# Patient Record
Sex: FEMALE | Race: BLACK OR AFRICAN AMERICAN | NOT HISPANIC OR LATINO | Employment: OTHER | URBAN - METROPOLITAN AREA
[De-identification: names, ages, dates, MRNs, and addresses within clinical notes are randomized per-mention and may not be internally consistent; named-entity substitution may affect disease eponyms.]

---

## 2020-01-01 ENCOUNTER — APPOINTMENT (OUTPATIENT)
Dept: GENERAL RADIOLOGY | Facility: HOSPITAL | Age: 75
End: 2020-01-01

## 2020-01-01 ENCOUNTER — HOSPITAL ENCOUNTER (OUTPATIENT)
Facility: HOSPITAL | Age: 75
Setting detail: HOSPITAL OUTPATIENT SURGERY
End: 2020-01-01
Attending: INTERNAL MEDICINE | Admitting: INTERNAL MEDICINE

## 2020-01-01 ENCOUNTER — APPOINTMENT (OUTPATIENT)
Dept: CT IMAGING | Facility: HOSPITAL | Age: 75
End: 2020-01-01

## 2020-01-01 ENCOUNTER — APPOINTMENT (OUTPATIENT)
Dept: CARDIOLOGY | Facility: HOSPITAL | Age: 75
End: 2020-01-01

## 2020-01-01 ENCOUNTER — HOSPITAL ENCOUNTER (INPATIENT)
Facility: HOSPITAL | Age: 75
LOS: 4 days | Discharge: NURSING, REHAB OR LONG-TERM CARE | End: 2020-05-05
Attending: EMERGENCY MEDICINE | Admitting: HOSPITALIST

## 2020-01-01 ENCOUNTER — HOSPITAL ENCOUNTER (INPATIENT)
Facility: HOSPITAL | Age: 75
LOS: 2 days | End: 2020-06-07
Attending: EMERGENCY MEDICINE | Admitting: INTERNAL MEDICINE

## 2020-01-01 ENCOUNTER — LAB REQUISITION (OUTPATIENT)
Dept: LAB | Facility: HOSPITAL | Age: 75
End: 2020-01-01

## 2020-01-01 VITALS
WEIGHT: 195.33 LBS | TEMPERATURE: 94.8 F | SYSTOLIC BLOOD PRESSURE: 97 MMHG | BODY MASS INDEX: 31.39 KG/M2 | HEIGHT: 66 IN | OXYGEN SATURATION: 75 % | DIASTOLIC BLOOD PRESSURE: 79 MMHG

## 2020-01-01 VITALS
HEART RATE: 69 BPM | RESPIRATION RATE: 16 BRPM | SYSTOLIC BLOOD PRESSURE: 138 MMHG | BODY MASS INDEX: 31.7 KG/M2 | OXYGEN SATURATION: 100 % | TEMPERATURE: 97.4 F | HEIGHT: 65 IN | WEIGHT: 190.26 LBS | DIASTOLIC BLOOD PRESSURE: 86 MMHG

## 2020-01-01 DIAGNOSIS — R79.89 ELEVATED PROCALCITONIN: ICD-10-CM

## 2020-01-01 DIAGNOSIS — A41.9 SEPSIS WITH ACUTE RENAL FAILURE AND SEPTIC SHOCK, DUE TO UNSPECIFIED ORGANISM, UNSPECIFIED ACUTE RENAL FAILURE TYPE (HCC): Primary | ICD-10-CM

## 2020-01-01 DIAGNOSIS — R65.21 SEPSIS WITH ACUTE RENAL FAILURE AND SEPTIC SHOCK, DUE TO UNSPECIFIED ORGANISM, UNSPECIFIED ACUTE RENAL FAILURE TYPE (HCC): Primary | ICD-10-CM

## 2020-01-01 DIAGNOSIS — R93.0 ABNORMAL HEAD CT: ICD-10-CM

## 2020-01-01 DIAGNOSIS — D69.6 THROMBOCYTOPENIA (HCC): ICD-10-CM

## 2020-01-01 DIAGNOSIS — Z00.00 ROUTINE GENERAL MEDICAL EXAMINATION AT A HEALTH CARE FACILITY: ICD-10-CM

## 2020-01-01 DIAGNOSIS — Z99.2 ESRD (END STAGE RENAL DISEASE) ON DIALYSIS (HCC): ICD-10-CM

## 2020-01-01 DIAGNOSIS — R78.81 BACTEREMIA: Primary | ICD-10-CM

## 2020-01-01 DIAGNOSIS — N39.0 ACUTE UTI: ICD-10-CM

## 2020-01-01 DIAGNOSIS — N17.9 SEPSIS WITH ACUTE RENAL FAILURE AND SEPTIC SHOCK, DUE TO UNSPECIFIED ORGANISM, UNSPECIFIED ACUTE RENAL FAILURE TYPE (HCC): Primary | ICD-10-CM

## 2020-01-01 DIAGNOSIS — N18.6 ESRD (END STAGE RENAL DISEASE) ON DIALYSIS (HCC): ICD-10-CM

## 2020-01-01 LAB
ABO GROUP BLD: NORMAL
ABO GROUP BLD: NORMAL
ALBUMIN SERPL-MCNC: 1.2 G/DL (ref 3.5–5.2)
ALBUMIN SERPL-MCNC: 1.2 G/DL (ref 3.5–5.2)
ALBUMIN SERPL-MCNC: 1.5 G/DL (ref 3.5–5.2)
ALBUMIN SERPL-MCNC: 1.5 G/DL (ref 3.5–5.2)
ALBUMIN SERPL-MCNC: 1.6 G/DL (ref 3.5–5.2)
ALBUMIN SERPL-MCNC: 1.9 G/DL (ref 3.5–5.2)
ALBUMIN SERPL-MCNC: 2 G/DL (ref 3.5–5.2)
ALBUMIN/GLOB SERPL: 0.4 G/DL
ALBUMIN/GLOB SERPL: 0.5 G/DL
ALBUMIN/GLOB SERPL: 0.5 G/DL
ALP SERPL-CCNC: 121 U/L (ref 39–117)
ALP SERPL-CCNC: 213 U/L (ref 39–117)
ALP SERPL-CCNC: 98 U/L (ref 39–117)
ALT SERPL W P-5'-P-CCNC: 18 U/L (ref 1–33)
ALT SERPL W P-5'-P-CCNC: 24 U/L (ref 1–33)
ALT SERPL W P-5'-P-CCNC: 41 U/L (ref 1–33)
ANION GAP SERPL CALCULATED.3IONS-SCNC: 15.8 MMOL/L (ref 5–15)
ANION GAP SERPL CALCULATED.3IONS-SCNC: 17.1 MMOL/L (ref 5–15)
ANION GAP SERPL CALCULATED.3IONS-SCNC: 18.3 MMOL/L (ref 5–15)
ANION GAP SERPL CALCULATED.3IONS-SCNC: 18.4 MMOL/L (ref 5–15)
ANION GAP SERPL CALCULATED.3IONS-SCNC: 18.5 MMOL/L (ref 5–15)
ANION GAP SERPL CALCULATED.3IONS-SCNC: 28.5 MMOL/L (ref 5–15)
ANION GAP SERPL CALCULATED.3IONS-SCNC: 28.9 MMOL/L (ref 5–15)
ANION GAP SERPL CALCULATED.3IONS-SCNC: 33.9 MMOL/L (ref 5–15)
ANISOCYTOSIS BLD QL: ABNORMAL
AORTIC DIMENSIONLESS INDEX: 0.7 (DI)
APTT PPP: >200 SECONDS (ref 22.7–35.4)
ARTERIAL PATENCY WRIST A: POSITIVE
AST SERPL-CCNC: 28 U/L (ref 1–32)
AST SERPL-CCNC: 49 U/L (ref 1–32)
AST SERPL-CCNC: 62 U/L (ref 1–32)
ATMOSPHERIC PRESS: 750.2 MMHG
ATMOSPHERIC PRESS: 751.5 MMHG
ATMOSPHERIC PRESS: 752.4 MMHG
BACTERIA SPEC AEROBE CULT: ABNORMAL
BACTERIA SPEC AEROBE CULT: NORMAL
BACTERIA UR QL AUTO: ABNORMAL /HPF
BACTERIA UR QL AUTO: ABNORMAL /HPF
BASE EXCESS BLDA CALC-SCNC: -21.8 MMOL/L (ref 0–2)
BASE EXCESS BLDA CALC-SCNC: -22.6 MMOL/L (ref 0–2)
BASE EXCESS BLDA CALC-SCNC: -6.2 MMOL/L (ref 0–2)
BASOPHILS # BLD MANUAL: 0.04 10*3/MM3 (ref 0–0.2)
BASOPHILS NFR BLD AUTO: 1 % (ref 0–1.5)
BDY SITE: ABNORMAL
BH BB BLOOD EXPIRATION DATE: NORMAL
BH BB BLOOD EXPIRATION DATE: NORMAL
BH BB BLOOD TYPE BARCODE: 5100
BH BB BLOOD TYPE BARCODE: 5100
BH BB DISPENSE STATUS: NORMAL
BH BB DISPENSE STATUS: NORMAL
BH BB PRODUCT CODE: NORMAL
BH BB PRODUCT CODE: NORMAL
BH BB UNIT NUMBER: NORMAL
BH BB UNIT NUMBER: NORMAL
BH CV ECHO MEAS - ACS: 0.9 CM
BH CV ECHO MEAS - AO MAX PG: 13 MMHG
BH CV ECHO MEAS - AO MEAN PG (FULL): 3 MMHG
BH CV ECHO MEAS - AO MEAN PG: 5 MMHG
BH CV ECHO MEAS - AO V2 MAX: 179 CM/SEC
BH CV ECHO MEAS - AO V2 MEAN: 101 CM/SEC
BH CV ECHO MEAS - AO V2 VTI: 29.5 CM
BH CV ECHO MEAS - AVA(I,A): 2.1 CM^2
BH CV ECHO MEAS - AVA(I,D): 2.1 CM^2
BH CV ECHO MEAS - BSA(HAYCOCK): 1.9 M^2
BH CV ECHO MEAS - BSA: 1.9 M^2
BH CV ECHO MEAS - BZI_BMI: 28.2 KILOGRAMS/M^2
BH CV ECHO MEAS - BZI_METRIC_HEIGHT: 167.6 CM
BH CV ECHO MEAS - BZI_METRIC_WEIGHT: 79.4 KG
BH CV ECHO MEAS - EDV(CUBED): 42.9 ML
BH CV ECHO MEAS - EDV(MOD-SP2): 39 ML
BH CV ECHO MEAS - EDV(MOD-SP4): 45 ML
BH CV ECHO MEAS - EDV(TEICH): 50.9 ML
BH CV ECHO MEAS - EF(CUBED): 75.2 %
BH CV ECHO MEAS - EF(MOD-BP): 70 %
BH CV ECHO MEAS - EF(MOD-SP2): 61.5 %
BH CV ECHO MEAS - EF(MOD-SP4): 71.1 %
BH CV ECHO MEAS - EF(TEICH): 68.1 %
BH CV ECHO MEAS - ESV(CUBED): 10.6 ML
BH CV ECHO MEAS - ESV(MOD-SP2): 15 ML
BH CV ECHO MEAS - ESV(MOD-SP4): 13 ML
BH CV ECHO MEAS - ESV(TEICH): 16.2 ML
BH CV ECHO MEAS - FS: 37.1 %
BH CV ECHO MEAS - IVS/LVPW: 0.91
BH CV ECHO MEAS - IVSD: 1 CM
BH CV ECHO MEAS - LAT PEAK E' VEL: 7 CM/SEC
BH CV ECHO MEAS - LV DIASTOLIC VOL/BSA (35-75): 23.8 ML/M^2
BH CV ECHO MEAS - LV MASS(C)D: 111 GRAMS
BH CV ECHO MEAS - LV MASS(C)DI: 58.8 GRAMS/M^2
BH CV ECHO MEAS - LV MAX PG: 6 MMHG
BH CV ECHO MEAS - LV MEAN PG: 2 MMHG
BH CV ECHO MEAS - LV SYSTOLIC VOL/BSA (12-30): 6.9 ML/M^2
BH CV ECHO MEAS - LV V1 MAX: 122 CM/SEC
BH CV ECHO MEAS - LV V1 MEAN: 67.8 CM/SEC
BH CV ECHO MEAS - LV V1 VTI: 19.7 CM
BH CV ECHO MEAS - LVIDD: 3.5 CM
BH CV ECHO MEAS - LVIDS: 2.2 CM
BH CV ECHO MEAS - LVLD AP2: 5.8 CM
BH CV ECHO MEAS - LVLD AP4: 7.1 CM
BH CV ECHO MEAS - LVLS AP2: 5 CM
BH CV ECHO MEAS - LVLS AP4: 4.9 CM
BH CV ECHO MEAS - LVOT AREA (M): 3.1 CM^2
BH CV ECHO MEAS - LVOT AREA: 3.1 CM^2
BH CV ECHO MEAS - LVOT DIAM: 2 CM
BH CV ECHO MEAS - LVPWD: 1.1 CM
BH CV ECHO MEAS - MED PEAK E' VEL: 5 CM/SEC
BH CV ECHO MEAS - MV A DUR: 0.12 SEC
BH CV ECHO MEAS - MV A MAX VEL: 104 CM/SEC
BH CV ECHO MEAS - MV DEC SLOPE: 274 CM/SEC^2
BH CV ECHO MEAS - MV DEC TIME: 250 SEC
BH CV ECHO MEAS - MV E MAX VEL: 70.6 CM/SEC
BH CV ECHO MEAS - MV E/A: 0.68
BH CV ECHO MEAS - MV MEAN PG: 2 MMHG
BH CV ECHO MEAS - MV P1/2T MAX VEL: 69.8 CM/SEC
BH CV ECHO MEAS - MV P1/2T: 74.6 MSEC
BH CV ECHO MEAS - MV V2 MEAN: 57.8 CM/SEC
BH CV ECHO MEAS - MV V2 VTI: 21.7 CM
BH CV ECHO MEAS - MVA P1/2T LCG: 3.2 CM^2
BH CV ECHO MEAS - MVA(P1/2T): 2.9 CM^2
BH CV ECHO MEAS - MVA(VTI): 2.9 CM^2
BH CV ECHO MEAS - PA ACC SLOPE: 857 CM/SEC^2
BH CV ECHO MEAS - PA ACC TIME: 0.11 SEC
BH CV ECHO MEAS - PA MAX PG: 3.6 MMHG
BH CV ECHO MEAS - PA PR(ACCEL): 29.1 MMHG
BH CV ECHO MEAS - PA V2 MAX: 95 CM/SEC
BH CV ECHO MEAS - PULM A REVS DUR: 0.11 SEC
BH CV ECHO MEAS - PULM A REVS VEL: 27.7 CM/SEC
BH CV ECHO MEAS - PULM DIAS VEL: 23.4 CM/SEC
BH CV ECHO MEAS - PULM S/D: 1.2
BH CV ECHO MEAS - PULM SYS VEL: 27.3 CM/SEC
BH CV ECHO MEAS - QP/QS: 0.45
BH CV ECHO MEAS - RV MEAN PG: 1 MMHG
BH CV ECHO MEAS - RV V1 MEAN: 35.7 CM/SEC
BH CV ECHO MEAS - RV V1 VTI: 8.9 CM
BH CV ECHO MEAS - RVOT AREA: 3.1 CM^2
BH CV ECHO MEAS - RVOT DIAM: 2 CM
BH CV ECHO MEAS - SI(CUBED): 17.1 ML/M^2
BH CV ECHO MEAS - SI(LVOT): 32.7 ML/M^2
BH CV ECHO MEAS - SI(MOD-SP2): 12.7 ML/M^2
BH CV ECHO MEAS - SI(MOD-SP4): 16.9 ML/M^2
BH CV ECHO MEAS - SI(TEICH): 18.3 ML/M^2
BH CV ECHO MEAS - SV(CUBED): 32.2 ML
BH CV ECHO MEAS - SV(LVOT): 61.9 ML
BH CV ECHO MEAS - SV(MOD-SP2): 24 ML
BH CV ECHO MEAS - SV(MOD-SP4): 32 ML
BH CV ECHO MEAS - SV(RVOT): 27.9 ML
BH CV ECHO MEAS - SV(TEICH): 34.7 ML
BH CV ECHO MEAS - TAPSE (>1.6): 1 CM2
BH CV ECHO MEAS - TR MAX VEL: 261 CM/SEC
BH CV ECHO MEASUREMENTS AVERAGE E/E' RATIO: 11.77
BH CV VAS BP RIGHT ARM: NORMAL MMHG
BH CV XLRA - RV BASE: 3 CM
BH CV XLRA - TDI S': 11 CM/SEC
BH CV XLRA MEAS LEFT CCA RATIO VEL: 47.7 CM/SEC
BH CV XLRA MEAS LEFT DIST CCA EDV: -15.1 CM/SEC
BH CV XLRA MEAS LEFT DIST CCA PSV: -27.7 CM/SEC
BH CV XLRA MEAS LEFT DIST ICA EDV: -96.2 CM/SEC
BH CV XLRA MEAS LEFT DIST ICA PSV: -164.6 CM/SEC
BH CV XLRA MEAS LEFT ICA RATIO VEL: -165 CM/SEC
BH CV XLRA MEAS LEFT ICA/CCA RATIO: -3.5
BH CV XLRA MEAS LEFT MID ICA EDV: -22.1 CM/SEC
BH CV XLRA MEAS LEFT MID ICA PSV: -40.3 CM/SEC
BH CV XLRA MEAS LEFT PROX CCA EDV: 21.1 CM/SEC
BH CV XLRA MEAS LEFT PROX CCA PSV: 47.7 CM/SEC
BH CV XLRA MEAS LEFT PROX ECA EDV: -7.9 CM/SEC
BH CV XLRA MEAS LEFT PROX ECA PSV: -13.1 CM/SEC
BH CV XLRA MEAS LEFT PROX ICA EDV: 16 CM/SEC
BH CV XLRA MEAS LEFT PROX ICA PSV: 25 CM/SEC
BH CV XLRA MEAS LEFT PROX SCLA PSV: 57.2 CM/SEC
BH CV XLRA MEAS LEFT VERTEBRAL A EDV: -31.1 CM/SEC
BH CV XLRA MEAS LEFT VERTEBRAL A PSV: -52.2 CM/SEC
BH CV XLRA MEAS RIGHT CCA RATIO VEL: 22 CM/SEC
BH CV XLRA MEAS RIGHT DIST CCA EDV: 9.8 CM/SEC
BH CV XLRA MEAS RIGHT DIST CCA PSV: 22 CM/SEC
BH CV XLRA MEAS RIGHT DIST ICA EDV: -40.3 CM/SEC
BH CV XLRA MEAS RIGHT DIST ICA PSV: -67.6 CM/SEC
BH CV XLRA MEAS RIGHT ICA RATIO VEL: -67.6 CM/SEC
BH CV XLRA MEAS RIGHT ICA/CCA RATIO: -3.1
BH CV XLRA MEAS RIGHT MID ICA EDV: -25 CM/SEC
BH CV XLRA MEAS RIGHT MID ICA PSV: -39.4 CM/SEC
BH CV XLRA MEAS RIGHT PROX CCA EDV: -10.6 CM/SEC
BH CV XLRA MEAS RIGHT PROX CCA PSV: -34.9 CM/SEC
BH CV XLRA MEAS RIGHT PROX ECA EDV: -6.5 CM/SEC
BH CV XLRA MEAS RIGHT PROX ECA PSV: -12.4 CM/SEC
BH CV XLRA MEAS RIGHT PROX ICA EDV: -6.3 CM/SEC
BH CV XLRA MEAS RIGHT PROX ICA PSV: -12.4 CM/SEC
BH CV XLRA MEAS RIGHT PROX SCLA PSV: 45.7 CM/SEC
BH CV XLRA MEAS RIGHT VERTEBRAL A EDV: 40.4 CM/SEC
BH CV XLRA MEAS RIGHT VERTEBRAL A PSV: 69.8 CM/SEC
BILIRUB SERPL-MCNC: 0.6 MG/DL (ref 0.2–1.2)
BILIRUB SERPL-MCNC: 0.8 MG/DL (ref 0.2–1.2)
BILIRUB SERPL-MCNC: 0.8 MG/DL (ref 0.2–1.2)
BILIRUB UR QL STRIP: NEGATIVE
BILIRUB UR QL STRIP: NEGATIVE
BLD GP AB SCN SERPL QL: NEGATIVE
BLD GP AB SCN SERPL QL: NEGATIVE
BUN BLD-MCNC: 10 MG/DL (ref 8–23)
BUN BLD-MCNC: 25 MG/DL (ref 8–23)
BUN BLD-MCNC: 31 MG/DL (ref 8–23)
BUN BLD-MCNC: 37 MG/DL (ref 8–23)
BUN BLD-MCNC: 6 MG/DL (ref 8–23)
BUN BLD-MCNC: 7 MG/DL (ref 8–23)
BUN/CREAT SERPL: 3.1 (ref 7–25)
BUN/CREAT SERPL: 3.4 (ref 7–25)
BUN/CREAT SERPL: 3.6 (ref 7–25)
BUN/CREAT SERPL: 3.6 (ref 7–25)
BUN/CREAT SERPL: 3.7 (ref 7–25)
BUN/CREAT SERPL: 7.4 (ref 7–25)
BUN/CREAT SERPL: 7.8 (ref 7–25)
BUN/CREAT SERPL: 8 (ref 7–25)
BURR CELLS BLD QL SMEAR: ABNORMAL
BURR CELLS BLD QL SMEAR: ABNORMAL
CA-I BLD-MCNC: 3.5 MG/DL (ref 4.6–5.4)
CA-I SERPL ISE-MCNC: 0.88 MMOL/L (ref 1.15–1.35)
CALCIUM SPEC-SCNC: 6.3 MG/DL (ref 8.6–10.5)
CALCIUM SPEC-SCNC: 6.9 MG/DL (ref 8.6–10.5)
CALCIUM SPEC-SCNC: 7 MG/DL (ref 8.6–10.5)
CALCIUM SPEC-SCNC: 7.1 MG/DL (ref 8.6–10.5)
CALCIUM SPEC-SCNC: 7.2 MG/DL (ref 8.6–10.5)
CALCIUM SPEC-SCNC: 8 MG/DL (ref 8.6–10.5)
CALCIUM SPEC-SCNC: 8 MG/DL (ref 8.6–10.5)
CALCIUM SPEC-SCNC: 8.3 MG/DL (ref 8.6–10.5)
CHLORIDE SERPL-SCNC: 100 MMOL/L (ref 98–107)
CHLORIDE SERPL-SCNC: 101 MMOL/L (ref 98–107)
CHLORIDE SERPL-SCNC: 103 MMOL/L (ref 98–107)
CHLORIDE SERPL-SCNC: 105 MMOL/L (ref 98–107)
CHLORIDE SERPL-SCNC: 105 MMOL/L (ref 98–107)
CHLORIDE SERPL-SCNC: 108 MMOL/L (ref 98–107)
CHLORIDE SERPL-SCNC: 96 MMOL/L (ref 98–107)
CHLORIDE SERPL-SCNC: 97 MMOL/L (ref 98–107)
CHOLEST SERPL-MCNC: 82 MG/DL (ref 0–200)
CLARITY UR: ABNORMAL
CLARITY UR: ABNORMAL
CO2 SERPL-SCNC: 17.7 MMOL/L (ref 22–29)
CO2 SERPL-SCNC: 21.2 MMOL/L (ref 22–29)
CO2 SERPL-SCNC: 25.5 MMOL/L (ref 22–29)
CO2 SERPL-SCNC: 25.6 MMOL/L (ref 22–29)
CO2 SERPL-SCNC: 27.9 MMOL/L (ref 22–29)
CO2 SERPL-SCNC: 4.1 MMOL/L (ref 22–29)
CO2 SERPL-SCNC: 6.5 MMOL/L (ref 22–29)
CO2 SERPL-SCNC: 8.1 MMOL/L (ref 22–29)
COLOR UR: ABNORMAL
COLOR UR: ABNORMAL
CREAT BLD-MCNC: 1.92 MG/DL (ref 0.57–1)
CREAT BLD-MCNC: 1.97 MG/DL (ref 0.57–1)
CREAT BLD-MCNC: 2.69 MG/DL (ref 0.57–1)
CREAT BLD-MCNC: 2.79 MG/DL (ref 0.57–1)
CREAT BLD-MCNC: 2.92 MG/DL (ref 0.57–1)
CREAT BLD-MCNC: 3.37 MG/DL (ref 0.57–1)
CREAT BLD-MCNC: 3.96 MG/DL (ref 0.57–1)
CREAT BLD-MCNC: 4.61 MG/DL (ref 0.57–1)
CRP SERPL-MCNC: 23.64 MG/DL (ref 0–0.5)
D-LACTATE SERPL-SCNC: 17.6 MMOL/L (ref 0.5–2)
D-LACTATE SERPL-SCNC: 6 MMOL/L (ref 0.5–2)
D-LACTATE SERPL-SCNC: 6.7 MMOL/L (ref 0.5–2)
D-LACTATE SERPL-SCNC: 6.7 MMOL/L (ref 0.5–2)
DACRYOCYTES BLD QL SMEAR: ABNORMAL
DACRYOCYTES BLD QL SMEAR: ABNORMAL
DEPRECATED RDW RBC AUTO: 54.3 FL (ref 37–54)
DEPRECATED RDW RBC AUTO: 56 FL (ref 37–54)
DEPRECATED RDW RBC AUTO: 56.4 FL (ref 37–54)
DEPRECATED RDW RBC AUTO: 57.3 FL (ref 37–54)
DEPRECATED RDW RBC AUTO: 58.3 FL (ref 37–54)
DEPRECATED RDW RBC AUTO: 59.8 FL (ref 37–54)
DEPRECATED RDW RBC AUTO: 60.3 FL (ref 37–54)
DEPRECATED RDW RBC AUTO: 60.5 FL (ref 37–54)
DEPRECATED RDW RBC AUTO: 61.3 FL (ref 37–54)
DEPRECATED RDW RBC AUTO: 62.2 FL (ref 37–54)
EOSINOPHIL # BLD MANUAL: 0.07 10*3/MM3 (ref 0–0.4)
EOSINOPHIL # BLD MANUAL: 0.14 10*3/MM3 (ref 0–0.4)
EOSINOPHIL NFR BLD MANUAL: 1 % (ref 0.3–6.2)
EOSINOPHIL NFR BLD MANUAL: 2 % (ref 0.3–6.2)
ERYTHROCYTE [DISTWIDTH] IN BLOOD BY AUTOMATED COUNT: 15.2 % (ref 12.3–15.4)
ERYTHROCYTE [DISTWIDTH] IN BLOOD BY AUTOMATED COUNT: 17.8 % (ref 12.3–15.4)
ERYTHROCYTE [DISTWIDTH] IN BLOOD BY AUTOMATED COUNT: 18 % (ref 12.3–15.4)
ERYTHROCYTE [DISTWIDTH] IN BLOOD BY AUTOMATED COUNT: 18.1 % (ref 12.3–15.4)
ERYTHROCYTE [DISTWIDTH] IN BLOOD BY AUTOMATED COUNT: 18.4 % (ref 12.3–15.4)
ERYTHROCYTE [DISTWIDTH] IN BLOOD BY AUTOMATED COUNT: 19.8 % (ref 12.3–15.4)
ERYTHROCYTE [DISTWIDTH] IN BLOOD BY AUTOMATED COUNT: 20.7 % (ref 12.3–15.4)
ERYTHROCYTE [DISTWIDTH] IN BLOOD BY AUTOMATED COUNT: 20.7 % (ref 12.3–15.4)
ERYTHROCYTE [DISTWIDTH] IN BLOOD BY AUTOMATED COUNT: 20.8 % (ref 12.3–15.4)
ERYTHROCYTE [DISTWIDTH] IN BLOOD BY AUTOMATED COUNT: 21 % (ref 12.3–15.4)
ERYTHROCYTE [SEDIMENTATION RATE] IN BLOOD: 53 MM/HR (ref 0–30)
FERRITIN SERPL-MCNC: 1108 NG/ML (ref 13–150)
FOLATE SERPL-MCNC: 15 NG/ML (ref 4.78–24.2)
GFR SERPL CREATININE-BSD FRML MDRD: 11 ML/MIN/1.73
GFR SERPL CREATININE-BSD FRML MDRD: 13 ML/MIN/1.73
GFR SERPL CREATININE-BSD FRML MDRD: 16 ML/MIN/1.73
GFR SERPL CREATININE-BSD FRML MDRD: 19 ML/MIN/1.73
GFR SERPL CREATININE-BSD FRML MDRD: 20 ML/MIN/1.73
GFR SERPL CREATININE-BSD FRML MDRD: 21 ML/MIN/1.73
GFR SERPL CREATININE-BSD FRML MDRD: 30 ML/MIN/1.73
GFR SERPL CREATININE-BSD FRML MDRD: 31 ML/MIN/1.73
GFR SERPL CREATININE-BSD FRML MDRD: ABNORMAL ML/MIN/{1.73_M2}
GFR SERPL CREATININE-BSD FRML MDRD: ABNORMAL ML/MIN/{1.73_M2}
GLOBULIN UR ELPH-MCNC: 3.8 GM/DL
GLOBULIN UR ELPH-MCNC: 4.1 GM/DL
GLOBULIN UR ELPH-MCNC: 4.2 GM/DL
GLUCOSE BLD-MCNC: 122 MG/DL (ref 65–99)
GLUCOSE BLD-MCNC: 133 MG/DL (ref 65–99)
GLUCOSE BLD-MCNC: 136 MG/DL (ref 65–99)
GLUCOSE BLD-MCNC: 142 MG/DL (ref 65–99)
GLUCOSE BLD-MCNC: 155 MG/DL (ref 65–99)
GLUCOSE BLD-MCNC: 220 MG/DL (ref 65–99)
GLUCOSE BLD-MCNC: 326 MG/DL (ref 65–99)
GLUCOSE BLD-MCNC: 95 MG/DL (ref 65–99)
GLUCOSE BLDC GLUCOMTR-MCNC: 100 MG/DL (ref 70–130)
GLUCOSE BLDC GLUCOMTR-MCNC: 100 MG/DL (ref 70–130)
GLUCOSE BLDC GLUCOMTR-MCNC: 102 MG/DL (ref 70–130)
GLUCOSE BLDC GLUCOMTR-MCNC: 103 MG/DL (ref 70–130)
GLUCOSE BLDC GLUCOMTR-MCNC: 104 MG/DL (ref 70–130)
GLUCOSE BLDC GLUCOMTR-MCNC: 105 MG/DL (ref 70–130)
GLUCOSE BLDC GLUCOMTR-MCNC: 110 MG/DL (ref 70–130)
GLUCOSE BLDC GLUCOMTR-MCNC: 117 MG/DL (ref 70–130)
GLUCOSE BLDC GLUCOMTR-MCNC: 117 MG/DL (ref 70–130)
GLUCOSE BLDC GLUCOMTR-MCNC: 120 MG/DL (ref 70–130)
GLUCOSE BLDC GLUCOMTR-MCNC: 121 MG/DL (ref 70–130)
GLUCOSE BLDC GLUCOMTR-MCNC: 137 MG/DL (ref 70–130)
GLUCOSE BLDC GLUCOMTR-MCNC: 154 MG/DL (ref 70–130)
GLUCOSE BLDC GLUCOMTR-MCNC: 182 MG/DL (ref 70–130)
GLUCOSE BLDC GLUCOMTR-MCNC: 33 MG/DL (ref 70–130)
GLUCOSE BLDC GLUCOMTR-MCNC: 72 MG/DL (ref 70–130)
GLUCOSE BLDC GLUCOMTR-MCNC: 73 MG/DL (ref 70–130)
GLUCOSE BLDC GLUCOMTR-MCNC: 79 MG/DL (ref 70–130)
GLUCOSE BLDC GLUCOMTR-MCNC: 85 MG/DL (ref 70–130)
GLUCOSE BLDC GLUCOMTR-MCNC: 87 MG/DL (ref 70–130)
GLUCOSE BLDC GLUCOMTR-MCNC: 87 MG/DL (ref 70–130)
GLUCOSE BLDC GLUCOMTR-MCNC: 93 MG/DL (ref 70–130)
GLUCOSE BLDC GLUCOMTR-MCNC: 94 MG/DL (ref 70–130)
GLUCOSE BLDC GLUCOMTR-MCNC: 98 MG/DL (ref 70–130)
GLUCOSE UR STRIP-MCNC: NEGATIVE MG/DL
GLUCOSE UR STRIP-MCNC: NEGATIVE MG/DL
HAPTOGLOB SERPL-MCNC: 276 MG/DL (ref 30–200)
HBA1C MFR BLD: 4.9 % (ref 4.8–5.6)
HBA1C MFR BLD: 5.62 % (ref 4.8–5.6)
HBV SURFACE AG SERPL QL IA: NORMAL
HCO3 BLDA-SCNC: 17.9 MMOL/L (ref 22–28)
HCO3 BLDA-SCNC: 7 MMOL/L (ref 22–28)
HCO3 BLDA-SCNC: 8 MMOL/L (ref 22–28)
HCT VFR BLD AUTO: 16.4 % (ref 34–46.6)
HCT VFR BLD AUTO: 20 % (ref 34–46.6)
HCT VFR BLD AUTO: 25 % (ref 34–46.6)
HCT VFR BLD AUTO: 25.1 % (ref 34–46.6)
HCT VFR BLD AUTO: 25.1 % (ref 34–46.6)
HCT VFR BLD AUTO: 25.3 % (ref 34–46.6)
HCT VFR BLD AUTO: 26 % (ref 34–46.6)
HCT VFR BLD AUTO: 26.4 % (ref 34–46.6)
HCT VFR BLD AUTO: 26.7 % (ref 34–46.6)
HCT VFR BLD AUTO: 26.8 % (ref 34–46.6)
HDLC SERPL-MCNC: 8 MG/DL (ref 40–60)
HGB BLD-MCNC: 5.1 G/DL (ref 12–15.9)
HGB BLD-MCNC: 6.5 G/DL (ref 12–15.9)
HGB BLD-MCNC: 8.1 G/DL (ref 12–15.9)
HGB BLD-MCNC: 8.4 G/DL (ref 12–15.9)
HGB BLD-MCNC: 8.4 G/DL (ref 12–15.9)
HGB BLD-MCNC: 8.5 G/DL (ref 12–15.9)
HGB BLD-MCNC: 8.5 G/DL (ref 12–15.9)
HGB BLD-MCNC: 8.7 G/DL (ref 12–15.9)
HGB BLD-MCNC: 9.2 G/DL (ref 12–15.9)
HGB BLD-MCNC: 9.3 G/DL (ref 12–15.9)
HGB UR QL STRIP.AUTO: ABNORMAL
HGB UR QL STRIP.AUTO: NEGATIVE
HOLD SPECIMEN: NORMAL
HOROWITZ INDEX BLD+IHG-RTO: 100 %
HOROWITZ INDEX BLD+IHG-RTO: 21 %
HOROWITZ INDEX BLD+IHG-RTO: 21 %
HYALINE CASTS UR QL AUTO: ABNORMAL /LPF
HYALINE CASTS UR QL AUTO: ABNORMAL /LPF
HYPOCHROMIA BLD QL: ABNORMAL
IGA SERPL-MCNC: 710 MG/DL (ref 64–422)
IGG SERPL-MCNC: 745 MG/DL (ref 586–1602)
IGM SERPL-MCNC: 74 MG/DL (ref 26–217)
INR PPP: 1.38 (ref 0.9–1.1)
INR PPP: 2.27 (ref 0.9–1.1)
INR PPP: 3.49 (ref 0.9–1.1)
INR PPP: 6.62 (ref 0.9–1.1)
INR PPP: 6.67 (ref 0.9–1.1)
IRON 24H UR-MRATE: 35 MCG/DL (ref 37–145)
IRON SATN MFR SERPL: 28 % (ref 20–50)
KETONES UR QL STRIP: ABNORMAL
KETONES UR QL STRIP: ABNORMAL
LACTATE HOLD SPECIMEN: NORMAL
LDH SERPL-CCNC: 219 U/L (ref 135–214)
LDH SERPL-CCNC: 227 U/L (ref 135–214)
LDLC SERPL CALC-MCNC: 42 MG/DL (ref 0–100)
LDLC/HDLC SERPL: 5.23 {RATIO}
LEFT ATRIUM VOLUME INDEX: 14 ML/M2
LEUKOCYTE ESTERASE UR QL STRIP.AUTO: ABNORMAL
LEUKOCYTE ESTERASE UR QL STRIP.AUTO: ABNORMAL
LIPASE SERPL-CCNC: 19 U/L (ref 13–60)
LYMPHOCYTES # BLD MANUAL: 0.28 10*3/MM3 (ref 0.7–3.1)
LYMPHOCYTES # BLD MANUAL: 0.3 10*3/MM3 (ref 0.7–3.1)
LYMPHOCYTES # BLD MANUAL: 0.96 10*3/MM3 (ref 0.7–3.1)
LYMPHOCYTES # BLD MANUAL: 0.99 10*3/MM3 (ref 0.7–3.1)
LYMPHOCYTES # BLD MANUAL: 1.44 10*3/MM3 (ref 0.7–3.1)
LYMPHOCYTES # BLD MANUAL: 1.47 10*3/MM3 (ref 0.7–3.1)
LYMPHOCYTES # BLD MANUAL: 1.96 10*3/MM3 (ref 0.7–3.1)
LYMPHOCYTES # BLD MANUAL: 2.45 10*3/MM3 (ref 0.7–3.1)
LYMPHOCYTES NFR BLD MANUAL: 11 % (ref 5–12)
LYMPHOCYTES NFR BLD MANUAL: 15 % (ref 19.6–45.3)
LYMPHOCYTES NFR BLD MANUAL: 17 % (ref 19.6–45.3)
LYMPHOCYTES NFR BLD MANUAL: 2 % (ref 19.6–45.3)
LYMPHOCYTES NFR BLD MANUAL: 2 % (ref 19.6–45.3)
LYMPHOCYTES NFR BLD MANUAL: 20.2 % (ref 5–12)
LYMPHOCYTES NFR BLD MANUAL: 21 % (ref 19.6–45.3)
LYMPHOCYTES NFR BLD MANUAL: 3 % (ref 5–12)
LYMPHOCYTES NFR BLD MANUAL: 3 % (ref 5–12)
LYMPHOCYTES NFR BLD MANUAL: 33.3 % (ref 19.6–45.3)
LYMPHOCYTES NFR BLD MANUAL: 4 % (ref 5–12)
LYMPHOCYTES NFR BLD MANUAL: 40 % (ref 19.6–45.3)
LYMPHOCYTES NFR BLD MANUAL: 6 % (ref 5–12)
LYMPHOCYTES NFR BLD MANUAL: 7 % (ref 19.6–45.3)
LYMPHOCYTES NFR BLD MANUAL: 7 % (ref 5–12)
LYMPHOCYTES NFR BLD MANUAL: 7 % (ref 5–12)
MACROCYTES BLD QL SMEAR: ABNORMAL
MACROCYTES BLD QL SMEAR: ABNORMAL
MAGNESIUM SERPL-MCNC: 1.8 MG/DL (ref 1.6–2.4)
MAGNESIUM SERPL-MCNC: 1.9 MG/DL (ref 1.6–2.4)
MAXIMAL PREDICTED HEART RATE: 145 BPM
MCH RBC QN AUTO: 27.1 PG (ref 26.6–33)
MCH RBC QN AUTO: 27.2 PG (ref 26.6–33)
MCH RBC QN AUTO: 27.6 PG (ref 26.6–33)
MCH RBC QN AUTO: 28.2 PG (ref 26.6–33)
MCH RBC QN AUTO: 28.2 PG (ref 26.6–33)
MCH RBC QN AUTO: 29 PG (ref 26.6–33)
MCH RBC QN AUTO: 29.2 PG (ref 26.6–33)
MCH RBC QN AUTO: 29.3 PG (ref 26.6–33)
MCH RBC QN AUTO: 29.5 PG (ref 26.6–33)
MCH RBC QN AUTO: 30.7 PG (ref 26.6–33)
MCHC RBC AUTO-ENTMCNC: 31.1 G/DL (ref 31.5–35.7)
MCHC RBC AUTO-ENTMCNC: 32.4 G/DL (ref 31.5–35.7)
MCHC RBC AUTO-ENTMCNC: 32.5 G/DL (ref 31.5–35.7)
MCHC RBC AUTO-ENTMCNC: 32.7 G/DL (ref 31.5–35.7)
MCHC RBC AUTO-ENTMCNC: 33 G/DL (ref 31.5–35.7)
MCHC RBC AUTO-ENTMCNC: 33.2 G/DL (ref 31.5–35.7)
MCHC RBC AUTO-ENTMCNC: 33.5 G/DL (ref 31.5–35.7)
MCHC RBC AUTO-ENTMCNC: 33.9 G/DL (ref 31.5–35.7)
MCHC RBC AUTO-ENTMCNC: 34.5 G/DL (ref 31.5–35.7)
MCHC RBC AUTO-ENTMCNC: 34.7 G/DL (ref 31.5–35.7)
MCV RBC AUTO: 80.4 FL (ref 79–97)
MCV RBC AUTO: 81.2 FL (ref 79–97)
MCV RBC AUTO: 81.9 FL (ref 79–97)
MCV RBC AUTO: 82.8 FL (ref 79–97)
MCV RBC AUTO: 83.8 FL (ref 79–97)
MCV RBC AUTO: 87.5 FL (ref 79–97)
MCV RBC AUTO: 87.8 FL (ref 79–97)
MCV RBC AUTO: 89.3 FL (ref 79–97)
MCV RBC AUTO: 90.9 FL (ref 79–97)
MCV RBC AUTO: 98.8 FL (ref 79–97)
METAMYELOCYTES NFR BLD MANUAL: 1 % (ref 0–0)
METAMYELOCYTES NFR BLD MANUAL: 2 % (ref 0–0)
METAMYELOCYTES NFR BLD MANUAL: 3 % (ref 0–0)
MODALITY: ABNORMAL
MONOCYTES # BLD AUTO: 0.26 10*3/MM3 (ref 0.1–0.9)
MONOCYTES # BLD AUTO: 0.35 10*3/MM3 (ref 0.1–0.9)
MONOCYTES # BLD AUTO: 0.41 10*3/MM3 (ref 0.1–0.9)
MONOCYTES # BLD AUTO: 0.41 10*3/MM3 (ref 0.1–0.9)
MONOCYTES # BLD AUTO: 0.58 10*3/MM3 (ref 0.1–0.9)
MONOCYTES # BLD AUTO: 0.6 10*3/MM3 (ref 0.1–0.9)
MONOCYTES # BLD AUTO: 0.99 10*3/MM3 (ref 0.1–0.9)
MONOCYTES # BLD AUTO: 1.79 10*3/MM3 (ref 0.1–0.9)
MYELOCYTES NFR BLD MANUAL: 1 % (ref 0–0)
MYELOCYTES NFR BLD MANUAL: 2 % (ref 0–0)
MYELOCYTES NFR BLD MANUAL: 2 % (ref 0–0)
MYELOCYTES NFR BLD MANUAL: 5 % (ref 0–0)
NEUTROPHILS # BLD AUTO: 1.06 10*3/MM3 (ref 1.7–7)
NEUTROPHILS # BLD AUTO: 1.33 10*3/MM3 (ref 1.7–7)
NEUTROPHILS # BLD AUTO: 11.18 10*3/MM3 (ref 1.7–7)
NEUTROPHILS # BLD AUTO: 11.57 10*3/MM3 (ref 1.7–7)
NEUTROPHILS # BLD AUTO: 12.95 10*3/MM3 (ref 1.7–7)
NEUTROPHILS # BLD AUTO: 14.14 10*3/MM3 (ref 1.7–7)
NEUTROPHILS # BLD AUTO: 4.54 10*3/MM3 (ref 1.7–7)
NEUTROPHILS # BLD AUTO: 8.86 10*3/MM3 (ref 1.7–7)
NEUTROPHILS NFR BLD MANUAL: 29 % (ref 42.7–76)
NEUTROPHILS NFR BLD MANUAL: 46.4 % (ref 42.7–76)
NEUTROPHILS NFR BLD MANUAL: 66 % (ref 42.7–76)
NEUTROPHILS NFR BLD MANUAL: 71 % (ref 42.7–76)
NEUTROPHILS NFR BLD MANUAL: 77 % (ref 42.7–76)
NEUTROPHILS NFR BLD MANUAL: 79 % (ref 42.7–76)
NEUTROPHILS NFR BLD MANUAL: 94 % (ref 42.7–76)
NEUTROPHILS NFR BLD MANUAL: 94 % (ref 42.7–76)
NITRITE UR QL STRIP: NEGATIVE
NITRITE UR QL STRIP: POSITIVE
NRBC SPEC MANUAL: 1 /100 WBC (ref 0–0.2)
NRBC SPEC MANUAL: 161.9 /100 WBC (ref 0–0.2)
NRBC SPEC MANUAL: 2 /100 WBC (ref 0–0.2)
NRBC SPEC MANUAL: 37 /100 WBC (ref 0–0.2)
NRBC SPEC MANUAL: 4 /100 WBC (ref 0–0.2)
NRBC SPEC MANUAL: 7 /100 WBC (ref 0–0.2)
O2 A-A PPRESDIFF RESPIRATORY: 0.4 MMHG
O2 A-A PPRESDIFF RESPIRATORY: 0.5 MMHG
O2 A-A PPRESDIFF RESPIRATORY: 0.7 MMHG
PCO2 BLDA: 27.8 MM HG (ref 35–45)
PCO2 BLDA: 29.9 MM HG (ref 35–45)
PCO2 BLDA: 32.5 MM HG (ref 35–45)
PEEP RESPIRATORY: 5 CM[H2O]
PH BLDA: 7 PH UNITS (ref 7.35–7.45)
PH BLDA: 7.01 PH UNITS (ref 7.35–7.45)
PH BLDA: 7.38 PH UNITS (ref 7.35–7.45)
PH UR STRIP.AUTO: 6 [PH] (ref 5–8)
PH UR STRIP.AUTO: 7 [PH] (ref 5–8)
PHOSPHATE SERPL-MCNC: 2.5 MG/DL (ref 2.5–4.5)
PHOSPHATE SERPL-MCNC: 3.8 MG/DL (ref 2.5–4.5)
PHOSPHATE SERPL-MCNC: 4.1 MG/DL (ref 2.5–4.5)
PHOSPHATE SERPL-MCNC: 6.8 MG/DL (ref 2.5–4.5)
PLAT MORPH BLD: NORMAL
PLATELET # BLD AUTO: 12 10*3/MM3 (ref 140–450)
PLATELET # BLD AUTO: 26 10*3/MM3 (ref 140–450)
PLATELET # BLD AUTO: 30 10*3/MM3 (ref 140–450)
PLATELET # BLD AUTO: 35 10*3/MM3 (ref 140–450)
PLATELET # BLD AUTO: 36 10*3/MM3 (ref 140–450)
PLATELET # BLD AUTO: 39 10*3/MM3 (ref 140–450)
PLATELET # BLD AUTO: 39 10*3/MM3 (ref 140–450)
PLATELET # BLD AUTO: 42 10*3/MM3 (ref 140–450)
PLATELET # BLD AUTO: 42 10*3/MM3 (ref 140–450)
PLATELET # BLD AUTO: 45 10*3/MM3 (ref 140–450)
PLATELET # BLD AUTO: 54 10*3/MM3 (ref 140–450)
PLATELET # BLD AUTO: 58 10*3/MM3 (ref 140–450)
PLATELETS.RETICULATED NFR BLD AUTO: 18.4 % (ref 0.9–6.5)
PLATELETS.RETICULATED NFR BLD AUTO: 20.5 % (ref 0.9–6.5)
PMV BLD AUTO: 9.7 FL (ref 6–12)
PMV BLD AUTO: ABNORMAL FL
PO2 BLDA: 277.5 MM HG (ref 80–100)
PO2 BLDA: 64 MM HG (ref 80–100)
PO2 BLDA: 81.3 MM HG (ref 80–100)
POIKILOCYTOSIS BLD QL SMEAR: ABNORMAL
POLYCHROMASIA BLD QL SMEAR: ABNORMAL
POTASSIUM BLD-SCNC: 3 MMOL/L (ref 3.5–5.2)
POTASSIUM BLD-SCNC: 3.1 MMOL/L (ref 3.5–5.2)
POTASSIUM BLD-SCNC: 3.3 MMOL/L (ref 3.5–5.2)
POTASSIUM BLD-SCNC: 3.9 MMOL/L (ref 3.5–5.2)
POTASSIUM BLD-SCNC: 4.1 MMOL/L (ref 3.5–5.2)
POTASSIUM BLD-SCNC: 4.3 MMOL/L (ref 3.5–5.2)
POTASSIUM BLD-SCNC: 4.4 MMOL/L (ref 3.5–5.2)
POTASSIUM BLD-SCNC: 6 MMOL/L (ref 3.5–5.2)
PROCALCITONIN SERPL-MCNC: 10.33 NG/ML (ref 0.1–0.25)
PROMYELOCYTES NFR BLD MANUAL: 1 % (ref 0–0)
PROT PATTERN SERPL IFE-IMP: ABNORMAL
PROT SERPL-MCNC: 5.6 G/DL (ref 6–8.5)
PROT SERPL-MCNC: 5.7 G/DL (ref 6–8.5)
PROT SERPL-MCNC: 6.2 G/DL (ref 6–8.5)
PROT UR QL STRIP: ABNORMAL
PROT UR QL STRIP: ABNORMAL
PROTHROMBIN TIME: 16.7 SECONDS (ref 11.7–14.2)
PROTHROMBIN TIME: 24.8 SECONDS (ref 11.7–14.2)
PROTHROMBIN TIME: 34.8 SECONDS (ref 11.7–14.2)
PROTHROMBIN TIME: 57.8 SECONDS (ref 11.7–14.2)
PROTHROMBIN TIME: 58.1 SECONDS (ref 11.7–14.2)
RBC # BLD AUTO: 1.66 10*6/MM3 (ref 3.77–5.28)
RBC # BLD AUTO: 2.24 10*6/MM3 (ref 3.77–5.28)
RBC # BLD AUTO: 2.75 10*6/MM3 (ref 3.77–5.28)
RBC # BLD AUTO: 2.87 10*6/MM3 (ref 3.77–5.28)
RBC # BLD AUTO: 2.88 10*6/MM3 (ref 3.77–5.28)
RBC # BLD AUTO: 3.12 10*6/MM3 (ref 3.77–5.28)
RBC # BLD AUTO: 3.14 10*6/MM3 (ref 3.77–5.28)
RBC # BLD AUTO: 3.15 10*6/MM3 (ref 3.77–5.28)
RBC # BLD AUTO: 3.26 10*6/MM3 (ref 3.77–5.28)
RBC # BLD AUTO: 3.3 10*6/MM3 (ref 3.77–5.28)
RBC # UR: ABNORMAL /HPF
RBC # UR: ABNORMAL /HPF
RBC MORPH BLD: NORMAL
REF LAB TEST METHOD: ABNORMAL
REF LAB TEST METHOD: ABNORMAL
RETICS # AUTO: 0.03 10*6/MM3 (ref 0.02–0.13)
RETICS/RBC NFR AUTO: 0.85 % (ref 0.7–1.9)
RH BLD: POSITIVE
RH BLD: POSITIVE
SAO2 % BLDCOA: 78.9 % (ref 92–99)
SAO2 % BLDCOA: 96 % (ref 92–99)
SAO2 % BLDCOA: 99.7 % (ref 92–99)
SARS-COV-2 RNA PNL SPEC NAA+PROBE: NOT DETECTED
SCHISTOCYTES BLD QL SMEAR: ABNORMAL
SET MECH RESP RATE: 22
SMUDGE CELLS BLD QL SMEAR: ABNORMAL
SODIUM BLD-SCNC: 138 MMOL/L (ref 136–145)
SODIUM BLD-SCNC: 139 MMOL/L (ref 136–145)
SODIUM BLD-SCNC: 140 MMOL/L (ref 136–145)
SODIUM BLD-SCNC: 142 MMOL/L (ref 136–145)
SODIUM BLD-SCNC: 142 MMOL/L (ref 136–145)
SODIUM BLD-SCNC: 143 MMOL/L (ref 136–145)
SODIUM BLD-SCNC: 143 MMOL/L (ref 136–145)
SODIUM BLD-SCNC: 144 MMOL/L (ref 136–145)
SP GR UR STRIP: 1.02 (ref 1–1.03)
SP GR UR STRIP: 1.02 (ref 1–1.03)
SQUAMOUS #/AREA URNS HPF: ABNORMAL /HPF
SQUAMOUS #/AREA URNS HPF: ABNORMAL /HPF
STRESS TARGET HR: 123 BPM
T&S EXPIRATION DATE: NORMAL
T&S EXPIRATION DATE: NORMAL
TARGETS BLD QL SMEAR: ABNORMAL
TIBC SERPL-MCNC: 127 MCG/DL (ref 298–536)
TOTAL RATE: 18 BREATHS/MINUTE
TOTAL RATE: 18 BREATHS/MINUTE
TOTAL RATE: 26 BREATHS/MINUTE
TRANSFERRIN SERPL-MCNC: 85 MG/DL (ref 200–360)
TRIGL SERPL-MCNC: 161 MG/DL (ref 0–150)
UNIT  ABO: NORMAL
UNIT  ABO: NORMAL
UNIT  RH: NORMAL
UNIT  RH: NORMAL
UROBILINOGEN UR QL STRIP: ABNORMAL
UROBILINOGEN UR QL STRIP: ABNORMAL
VANCOMYCIN SERPL-MCNC: 22.7 MCG/ML (ref 5–40)
VANCOMYCIN SERPL-MCNC: 9.3 MCG/ML (ref 5–40)
VARIANT LYMPHS NFR BLD MANUAL: 18 % (ref 0–5)
VARIANT LYMPHS NFR BLD MANUAL: 4 % (ref 0–5)
VENTILATOR MODE: AC
VIT B12 BLD-MCNC: >2000 PG/ML (ref 211–946)
VLDLC SERPL-MCNC: 32.2 MG/DL (ref 5–40)
VT ON VENT VENT: 600 ML
WBC MORPH BLD: NORMAL
WBC NRBC COR # BLD: 11.51 10*3/MM3 (ref 3.4–10.8)
WBC NRBC COR # BLD: 13.78 10*3/MM3 (ref 3.4–10.8)
WBC NRBC COR # BLD: 14.15 10*3/MM3 (ref 3.4–10.8)
WBC NRBC COR # BLD: 15.04 10*3/MM3 (ref 3.4–10.8)
WBC NRBC COR # BLD: 16.3 10*3/MM3 (ref 3.4–10.8)
WBC NRBC COR # BLD: 2.75 10*3/MM3 (ref 3.4–10.8)
WBC NRBC COR # BLD: 2.87 10*3/MM3 (ref 3.4–10.8)
WBC NRBC COR # BLD: 3.67 10*3/MM3 (ref 3.4–10.8)
WBC NRBC COR # BLD: 6.88 10*3/MM3 (ref 3.4–10.8)
WBC NRBC COR # BLD: 8.12 10*3/MM3 (ref 3.4–10.8)
WBC UR QL AUTO: ABNORMAL /HPF
WBC UR QL AUTO: ABNORMAL /HPF
WHOLE BLOOD HOLD SPECIMEN: NORMAL
WHOLE BLOOD HOLD SPECIMEN: NORMAL

## 2020-01-01 PROCEDURE — 83605 ASSAY OF LACTIC ACID: CPT | Performed by: INTERNAL MEDICINE

## 2020-01-01 PROCEDURE — 94799 UNLISTED PULMONARY SVC/PX: CPT

## 2020-01-01 PROCEDURE — 82962 GLUCOSE BLOOD TEST: CPT

## 2020-01-01 PROCEDURE — 87040 BLOOD CULTURE FOR BACTERIA: CPT | Performed by: INTERNAL MEDICINE

## 2020-01-01 PROCEDURE — 0BH18EZ INSERTION OF ENDOTRACHEAL AIRWAY INTO TRACHEA, VIA NATURAL OR ARTIFICIAL OPENING ENDOSCOPIC: ICD-10-PCS | Performed by: INTERNAL MEDICINE

## 2020-01-01 PROCEDURE — 92610 EVALUATE SWALLOWING FUNCTION: CPT

## 2020-01-01 PROCEDURE — 99233 SBSQ HOSP IP/OBS HIGH 50: CPT | Performed by: INTERNAL MEDICINE

## 2020-01-01 PROCEDURE — 83615 LACTATE (LD) (LDH) ENZYME: CPT | Performed by: INTERNAL MEDICINE

## 2020-01-01 PROCEDURE — 80069 RENAL FUNCTION PANEL: CPT | Performed by: INTERNAL MEDICINE

## 2020-01-01 PROCEDURE — 80053 COMPREHEN METABOLIC PANEL: CPT | Performed by: NURSE PRACTITIONER

## 2020-01-01 PROCEDURE — 25010000002 ERTAPENEM PER 500 MG: Performed by: INTERNAL MEDICINE

## 2020-01-01 PROCEDURE — 99231 SBSQ HOSP IP/OBS SF/LOW 25: CPT | Performed by: INTERNAL MEDICINE

## 2020-01-01 PROCEDURE — 85007 BL SMEAR W/DIFF WBC COUNT: CPT | Performed by: INTERNAL MEDICINE

## 2020-01-01 PROCEDURE — 87086 URINE CULTURE/COLONY COUNT: CPT | Performed by: NURSE PRACTITIONER

## 2020-01-01 PROCEDURE — 85730 THROMBOPLASTIN TIME PARTIAL: CPT | Performed by: INTERNAL MEDICINE

## 2020-01-01 PROCEDURE — 85027 COMPLETE CBC AUTOMATED: CPT | Performed by: INTERNAL MEDICINE

## 2020-01-01 PROCEDURE — 99232 SBSQ HOSP IP/OBS MODERATE 35: CPT | Performed by: INTERNAL MEDICINE

## 2020-01-01 PROCEDURE — 81001 URINALYSIS AUTO W/SCOPE: CPT | Performed by: EMERGENCY MEDICINE

## 2020-01-01 PROCEDURE — 94003 VENT MGMT INPAT SUBQ DAY: CPT

## 2020-01-01 PROCEDURE — 93880 EXTRACRANIAL BILAT STUDY: CPT

## 2020-01-01 PROCEDURE — 63710000001 INSULIN LISPRO (HUMAN) PER 5 UNITS: Performed by: HOSPITALIST

## 2020-01-01 PROCEDURE — 74176 CT ABD & PELVIS W/O CONTRAST: CPT

## 2020-01-01 PROCEDURE — P9035 PLATELET PHERES LEUKOREDUCED: HCPCS

## 2020-01-01 PROCEDURE — 80053 COMPREHEN METABOLIC PANEL: CPT | Performed by: EMERGENCY MEDICINE

## 2020-01-01 PROCEDURE — 71045 X-RAY EXAM CHEST 1 VIEW: CPT

## 2020-01-01 PROCEDURE — 84466 ASSAY OF TRANSFERRIN: CPT | Performed by: INTERNAL MEDICINE

## 2020-01-01 PROCEDURE — 25010000002 FENTANYL CITRATE (PF) 100 MCG/2ML SOLUTION

## 2020-01-01 PROCEDURE — 80048 BASIC METABOLIC PNL TOTAL CA: CPT | Performed by: HOSPITALIST

## 2020-01-01 PROCEDURE — 97110 THERAPEUTIC EXERCISES: CPT

## 2020-01-01 PROCEDURE — 85007 BL SMEAR W/DIFF WBC COUNT: CPT | Performed by: EMERGENCY MEDICINE

## 2020-01-01 PROCEDURE — 83540 ASSAY OF IRON: CPT | Performed by: INTERNAL MEDICINE

## 2020-01-01 PROCEDURE — 87340 HEPATITIS B SURFACE AG IA: CPT | Performed by: INTERNAL MEDICINE

## 2020-01-01 PROCEDURE — 87040 BLOOD CULTURE FOR BACTERIA: CPT | Performed by: NURSE PRACTITIONER

## 2020-01-01 PROCEDURE — 86923 COMPATIBILITY TEST ELECTRIC: CPT

## 2020-01-01 PROCEDURE — 36600 WITHDRAWAL OF ARTERIAL BLOOD: CPT

## 2020-01-01 PROCEDURE — 82746 ASSAY OF FOLIC ACID SERUM: CPT | Performed by: INTERNAL MEDICINE

## 2020-01-01 PROCEDURE — 25010000002 PIPERACILLIN SOD-TAZOBACTAM PER 1 G: Performed by: HOSPITALIST

## 2020-01-01 PROCEDURE — 25010000002 VANCOMYCIN 10 G RECONSTITUTED SOLUTION: Performed by: INTERNAL MEDICINE

## 2020-01-01 PROCEDURE — 25010000002 ONDANSETRON PER 1 MG: Performed by: HOSPITALIST

## 2020-01-01 PROCEDURE — 82803 BLOOD GASES ANY COMBINATION: CPT

## 2020-01-01 PROCEDURE — 86900 BLOOD TYPING SEROLOGIC ABO: CPT

## 2020-01-01 PROCEDURE — 81015 MICROSCOPIC EXAM OF URINE: CPT

## 2020-01-01 PROCEDURE — 74018 RADEX ABDOMEN 1 VIEW: CPT

## 2020-01-01 PROCEDURE — P9017 PLASMA 1 DONOR FRZ W/IN 8 HR: HCPCS

## 2020-01-01 PROCEDURE — 25010000002 PHENYLEPHRINE 10 MG/ML SOLUTION 5 ML VIAL: Performed by: INTERNAL MEDICINE

## 2020-01-01 PROCEDURE — 85025 COMPLETE CBC W/AUTO DIFF WBC: CPT | Performed by: EMERGENCY MEDICINE

## 2020-01-01 PROCEDURE — 25010000002 EPINEPHRINE 1 MG/ML SOLUTION 30 ML VIAL: Performed by: INTERNAL MEDICINE

## 2020-01-01 PROCEDURE — 80202 ASSAY OF VANCOMYCIN: CPT | Performed by: INTERNAL MEDICINE

## 2020-01-01 PROCEDURE — 86900 BLOOD TYPING SEROLOGIC ABO: CPT | Performed by: EMERGENCY MEDICINE

## 2020-01-01 PROCEDURE — 85025 COMPLETE CBC W/AUTO DIFF WBC: CPT | Performed by: INTERNAL MEDICINE

## 2020-01-01 PROCEDURE — 85610 PROTHROMBIN TIME: CPT | Performed by: INTERNAL MEDICINE

## 2020-01-01 PROCEDURE — 25010000002 MEROPENEM PER 100 MG: Performed by: INTERNAL MEDICINE

## 2020-01-01 PROCEDURE — 36415 COLL VENOUS BLD VENIPUNCTURE: CPT

## 2020-01-01 PROCEDURE — P9612 CATHETERIZE FOR URINE SPEC: HCPCS

## 2020-01-01 PROCEDURE — 85045 AUTOMATED RETICULOCYTE COUNT: CPT | Performed by: INTERNAL MEDICINE

## 2020-01-01 PROCEDURE — 25010000002 HEPARIN (PORCINE) PER 1000 UNITS: Performed by: INTERNAL MEDICINE

## 2020-01-01 PROCEDURE — 93005 ELECTROCARDIOGRAM TRACING: CPT | Performed by: INTERNAL MEDICINE

## 2020-01-01 PROCEDURE — 36415 COLL VENOUS BLD VENIPUNCTURE: CPT | Performed by: EMERGENCY MEDICINE

## 2020-01-01 PROCEDURE — 86140 C-REACTIVE PROTEIN: CPT | Performed by: EMERGENCY MEDICINE

## 2020-01-01 PROCEDURE — 82784 ASSAY IGA/IGD/IGG/IGM EACH: CPT | Performed by: INTERNAL MEDICINE

## 2020-01-01 PROCEDURE — 85055 RETICULATED PLATELET ASSAY: CPT | Performed by: INTERNAL MEDICINE

## 2020-01-01 PROCEDURE — 86927 PLASMA FRESH FROZEN: CPT

## 2020-01-01 PROCEDURE — 93010 ELECTROCARDIOGRAM REPORT: CPT | Performed by: INTERNAL MEDICINE

## 2020-01-01 PROCEDURE — 83036 HEMOGLOBIN GLYCOSYLATED A1C: CPT | Performed by: HOSPITALIST

## 2020-01-01 PROCEDURE — 83010 ASSAY OF HAPTOGLOBIN QUANT: CPT | Performed by: INTERNAL MEDICINE

## 2020-01-01 PROCEDURE — P9016 RBC LEUKOCYTES REDUCED: HCPCS

## 2020-01-01 PROCEDURE — 82728 ASSAY OF FERRITIN: CPT | Performed by: INTERNAL MEDICINE

## 2020-01-01 PROCEDURE — P9047 ALBUMIN (HUMAN), 25%, 50ML: HCPCS | Performed by: INTERNAL MEDICINE

## 2020-01-01 PROCEDURE — 87040 BLOOD CULTURE FOR BACTERIA: CPT | Performed by: EMERGENCY MEDICINE

## 2020-01-01 PROCEDURE — 85027 COMPLETE CBC AUTOMATED: CPT | Performed by: HOSPITALIST

## 2020-01-01 PROCEDURE — 85610 PROTHROMBIN TIME: CPT

## 2020-01-01 PROCEDURE — 86850 RBC ANTIBODY SCREEN: CPT | Performed by: EMERGENCY MEDICINE

## 2020-01-01 PROCEDURE — 97162 PT EVAL MOD COMPLEX 30 MIN: CPT

## 2020-01-01 PROCEDURE — 99232 SBSQ HOSP IP/OBS MODERATE 35: CPT | Performed by: PSYCHIATRY & NEUROLOGY

## 2020-01-01 PROCEDURE — 99223 1ST HOSP IP/OBS HIGH 75: CPT | Performed by: INTERNAL MEDICINE

## 2020-01-01 PROCEDURE — 84145 PROCALCITONIN (PCT): CPT | Performed by: NURSE PRACTITIONER

## 2020-01-01 PROCEDURE — 25010000003 HEPARIN LOCK FLUSH PER 10 UNITS: Performed by: EMERGENCY MEDICINE

## 2020-01-01 PROCEDURE — 25010000002 MEROPENEM PER 100 MG: Performed by: NURSE PRACTITIONER

## 2020-01-01 PROCEDURE — 85007 BL SMEAR W/DIFF WBC COUNT: CPT | Performed by: NURSE PRACTITIONER

## 2020-01-01 PROCEDURE — 80061 LIPID PANEL: CPT | Performed by: PSYCHIATRY & NEUROLOGY

## 2020-01-01 PROCEDURE — 86901 BLOOD TYPING SEROLOGIC RH(D): CPT | Performed by: EMERGENCY MEDICINE

## 2020-01-01 PROCEDURE — 99284 EMERGENCY DEPT VISIT MOD MDM: CPT

## 2020-01-01 PROCEDURE — 87635 SARS-COV-2 COVID-19 AMP PRB: CPT | Performed by: NURSE PRACTITIONER

## 2020-01-01 PROCEDURE — 81001 URINALYSIS AUTO W/SCOPE: CPT | Performed by: NURSE PRACTITIONER

## 2020-01-01 PROCEDURE — 36430 TRANSFUSION BLD/BLD COMPNT: CPT

## 2020-01-01 PROCEDURE — 25010000002 ALBUMIN HUMAN 25% PER 50 ML: Performed by: INTERNAL MEDICINE

## 2020-01-01 PROCEDURE — 85025 COMPLETE CBC W/AUTO DIFF WBC: CPT | Performed by: NURSE PRACTITIONER

## 2020-01-01 PROCEDURE — 93306 TTE W/DOPPLER COMPLETE: CPT

## 2020-01-01 PROCEDURE — 85610 PROTHROMBIN TIME: CPT | Performed by: HOSPITALIST

## 2020-01-01 PROCEDURE — 82330 ASSAY OF CALCIUM: CPT | Performed by: INTERNAL MEDICINE

## 2020-01-01 PROCEDURE — 82607 VITAMIN B-12: CPT | Performed by: INTERNAL MEDICINE

## 2020-01-01 PROCEDURE — 25010000002 PIPERACILLIN SOD-TAZOBACTAM PER 1 G: Performed by: EMERGENCY MEDICINE

## 2020-01-01 PROCEDURE — 70450 CT HEAD/BRAIN W/O DYE: CPT

## 2020-01-01 PROCEDURE — 25010000002 PROPOFOL 10 MG/ML EMULSION: Performed by: INTERNAL MEDICINE

## 2020-01-01 PROCEDURE — 83605 ASSAY OF LACTIC ACID: CPT | Performed by: NURSE PRACTITIONER

## 2020-01-01 PROCEDURE — 99285 EMERGENCY DEPT VISIT HI MDM: CPT

## 2020-01-01 PROCEDURE — 80053 COMPREHEN METABOLIC PANEL: CPT | Performed by: INTERNAL MEDICINE

## 2020-01-01 PROCEDURE — 94640 AIRWAY INHALATION TREATMENT: CPT

## 2020-01-01 PROCEDURE — 83735 ASSAY OF MAGNESIUM: CPT | Performed by: INTERNAL MEDICINE

## 2020-01-01 PROCEDURE — 83036 HEMOGLOBIN GLYCOSYLATED A1C: CPT | Performed by: PSYCHIATRY & NEUROLOGY

## 2020-01-01 PROCEDURE — 85652 RBC SED RATE AUTOMATED: CPT | Performed by: EMERGENCY MEDICINE

## 2020-01-01 PROCEDURE — 83690 ASSAY OF LIPASE: CPT | Performed by: EMERGENCY MEDICINE

## 2020-01-01 PROCEDURE — 71046 X-RAY EXAM CHEST 2 VIEWS: CPT

## 2020-01-01 PROCEDURE — 93306 TTE W/DOPPLER COMPLETE: CPT | Performed by: INTERNAL MEDICINE

## 2020-01-01 PROCEDURE — 99223 1ST HOSP IP/OBS HIGH 75: CPT | Performed by: PSYCHIATRY & NEUROLOGY

## 2020-01-01 PROCEDURE — 86850 RBC ANTIBODY SCREEN: CPT | Performed by: INTERNAL MEDICINE

## 2020-01-01 PROCEDURE — 5A1935Z RESPIRATORY VENTILATION, LESS THAN 24 CONSECUTIVE HOURS: ICD-10-PCS | Performed by: INTERNAL MEDICINE

## 2020-01-01 PROCEDURE — 86900 BLOOD TYPING SEROLOGIC ABO: CPT | Performed by: INTERNAL MEDICINE

## 2020-01-01 PROCEDURE — 86901 BLOOD TYPING SEROLOGIC RH(D): CPT | Performed by: INTERNAL MEDICINE

## 2020-01-01 PROCEDURE — 86334 IMMUNOFIX E-PHORESIS SERUM: CPT | Performed by: INTERNAL MEDICINE

## 2020-01-01 PROCEDURE — 25010000002 VANCOMYCIN 10 G RECONSTITUTED SOLUTION: Performed by: NURSE PRACTITIONER

## 2020-01-01 RX ORDER — SODIUM CHLORIDE 0.9 % (FLUSH) 0.9 %
20 SYRINGE (ML) INJECTION AS NEEDED
Status: DISCONTINUED | OUTPATIENT
Start: 2020-01-01 | End: 2020-01-01 | Stop reason: HOSPADM

## 2020-01-01 RX ORDER — HYDROCODONE BITARTRATE AND ACETAMINOPHEN 5; 325 MG/1; MG/1
1 TABLET ORAL EVERY 6 HOURS PRN
COMMUNITY

## 2020-01-01 RX ORDER — MIDODRINE HYDROCHLORIDE 5 MG/1
10 TABLET ORAL
Status: DISCONTINUED | OUTPATIENT
Start: 2020-01-01 | End: 2020-01-01 | Stop reason: HOSPADM

## 2020-01-01 RX ORDER — CLOPIDOGREL BISULFATE 75 MG/1
75 TABLET ORAL DAILY
Status: DISCONTINUED | OUTPATIENT
Start: 2020-01-01 | End: 2020-01-01

## 2020-01-01 RX ORDER — ALBUMIN (HUMAN) 12.5 G/50ML
12.5 SOLUTION INTRAVENOUS AS NEEDED
Status: CANCELLED | OUTPATIENT
Start: 2020-01-01 | End: 2020-01-01

## 2020-01-01 RX ORDER — FENTANYL CITRATE 50 UG/ML
INJECTION, SOLUTION INTRAMUSCULAR; INTRAVENOUS
Status: COMPLETED
Start: 2020-01-01 | End: 2020-01-01

## 2020-01-01 RX ORDER — METOPROLOL SUCCINATE 25 MG/1
25 TABLET, EXTENDED RELEASE ORAL DAILY
COMMUNITY

## 2020-01-01 RX ORDER — FOLIC ACID/VIT B COMPLEX AND C 0.8 MG
1 TABLET ORAL EVERY EVENING
Status: DISCONTINUED | OUTPATIENT
Start: 2020-01-01 | End: 2020-01-01 | Stop reason: HOSPADM

## 2020-01-01 RX ORDER — FENTANYL CITRATE 50 UG/ML
100 INJECTION, SOLUTION INTRAMUSCULAR; INTRAVENOUS ONCE
Status: COMPLETED | OUTPATIENT
Start: 2020-01-01 | End: 2020-01-01

## 2020-01-01 RX ORDER — ALBUMIN (HUMAN) 12.5 G/50ML
12.5 SOLUTION INTRAVENOUS AS NEEDED
Status: DISPENSED | OUTPATIENT
Start: 2020-01-01 | End: 2020-01-01

## 2020-01-01 RX ORDER — SODIUM CHLORIDE 0.9 % (FLUSH) 0.9 %
10 SYRINGE (ML) INJECTION AS NEEDED
Status: DISCONTINUED | OUTPATIENT
Start: 2020-01-01 | End: 2020-01-01 | Stop reason: HOSPADM

## 2020-01-01 RX ORDER — HEPARIN SODIUM 1000 [USP'U]/ML
4100 INJECTION, SOLUTION INTRAVENOUS; SUBCUTANEOUS ONCE
Status: COMPLETED | OUTPATIENT
Start: 2020-01-01 | End: 2020-01-01

## 2020-01-01 RX ORDER — ATORVASTATIN CALCIUM 10 MG/1
10 TABLET, FILM COATED ORAL DAILY
COMMUNITY

## 2020-01-01 RX ORDER — HEPARIN SODIUM (PORCINE) LOCK FLUSH IV SOLN 100 UNIT/ML 100 UNIT/ML
5 SOLUTION INTRAVENOUS AS NEEDED
Status: DISCONTINUED | OUTPATIENT
Start: 2020-01-01 | End: 2020-01-01 | Stop reason: HOSPADM

## 2020-01-01 RX ORDER — FOLIC ACID/VIT B COMPLEX AND C 0.8 MG
1 TABLET ORAL EVERY EVENING
COMMUNITY

## 2020-01-01 RX ORDER — NITROGLYCERIN 0.4 MG/1
0.4 TABLET SUBLINGUAL
COMMUNITY

## 2020-01-01 RX ORDER — POTASSIUM CHLORIDE 750 MG/1
40 CAPSULE, EXTENDED RELEASE ORAL DAILY
Status: DISCONTINUED | OUTPATIENT
Start: 2020-01-01 | End: 2020-01-01

## 2020-01-01 RX ORDER — SODIUM CHLORIDE 0.9 % (FLUSH) 0.9 %
10 SYRINGE (ML) INJECTION EVERY 12 HOURS SCHEDULED
Status: DISCONTINUED | OUTPATIENT
Start: 2020-01-01 | End: 2020-01-01 | Stop reason: HOSPADM

## 2020-01-01 RX ORDER — PROMETHAZINE HYDROCHLORIDE 25 MG/1
12.5-25 TABLET ORAL EVERY 6 HOURS PRN
COMMUNITY

## 2020-01-01 RX ORDER — DEXTROSE MONOHYDRATE 25 G/50ML
25 INJECTION, SOLUTION INTRAVENOUS
Status: DISCONTINUED | OUTPATIENT
Start: 2020-01-01 | End: 2020-01-01 | Stop reason: HOSPADM

## 2020-01-01 RX ORDER — ATORVASTATIN CALCIUM 20 MG/1
10 TABLET, FILM COATED ORAL DAILY
Status: DISCONTINUED | OUTPATIENT
Start: 2020-01-01 | End: 2020-01-01 | Stop reason: HOSPADM

## 2020-01-01 RX ORDER — HEPARIN SODIUM 1000 [USP'U]/ML
4100 INJECTION, SOLUTION INTRAVENOUS; SUBCUTANEOUS AS NEEDED
Status: DISCONTINUED | OUTPATIENT
Start: 2020-01-01 | End: 2020-01-01 | Stop reason: HOSPADM

## 2020-01-01 RX ORDER — DEXTROSE AND SODIUM CHLORIDE 5; .45 G/100ML; G/100ML
75 INJECTION, SOLUTION INTRAVENOUS CONTINUOUS
Status: DISCONTINUED | OUTPATIENT
Start: 2020-01-01 | End: 2020-01-01

## 2020-01-01 RX ORDER — LIDOCAINE 50 MG/G
1 PATCH TOPICAL EVERY 24 HOURS
COMMUNITY

## 2020-01-01 RX ORDER — ACETAMINOPHEN 325 MG/1
650 TABLET ORAL EVERY 6 HOURS PRN
COMMUNITY

## 2020-01-01 RX ORDER — CLONIDINE HYDROCHLORIDE 0.1 MG/1
0.2 TABLET ORAL AS NEEDED
COMMUNITY

## 2020-01-01 RX ORDER — BUDESONIDE AND FORMOTEROL FUMARATE DIHYDRATE 80; 4.5 UG/1; UG/1
2 AEROSOL RESPIRATORY (INHALATION)
Status: DISCONTINUED | OUTPATIENT
Start: 2020-01-01 | End: 2020-01-01 | Stop reason: HOSPADM

## 2020-01-01 RX ORDER — DEXTROSE MONOHYDRATE 25 G/50ML
INJECTION, SOLUTION INTRAVENOUS
Status: COMPLETED
Start: 2020-01-01 | End: 2020-01-01

## 2020-01-01 RX ORDER — NOREPINEPHRINE BIT/0.9 % NACL 8 MG/250ML
.02-.3 INFUSION BOTTLE (ML) INTRAVENOUS
Status: DISCONTINUED | OUTPATIENT
Start: 2020-01-01 | End: 2020-01-01 | Stop reason: HOSPADM

## 2020-01-01 RX ORDER — MIDODRINE HYDROCHLORIDE 10 MG/1
10 TABLET ORAL
COMMUNITY

## 2020-01-01 RX ORDER — HYDROCODONE BITARTRATE AND ACETAMINOPHEN 5; 325 MG/1; MG/1
1 TABLET ORAL EVERY 6 HOURS PRN
Status: DISCONTINUED | OUTPATIENT
Start: 2020-01-01 | End: 2020-01-01 | Stop reason: HOSPADM

## 2020-01-01 RX ORDER — VANCOMYCIN HYDROCHLORIDE 1 G/200ML
1000 INJECTION, SOLUTION INTRAVENOUS EVERY 12 HOURS
Status: DISCONTINUED | OUTPATIENT
Start: 2020-01-01 | End: 2020-01-01 | Stop reason: DRUGHIGH

## 2020-01-01 RX ORDER — ONDANSETRON 4 MG/1
4 TABLET, FILM COATED ORAL EVERY 6 HOURS PRN
Status: DISCONTINUED | OUTPATIENT
Start: 2020-01-01 | End: 2020-01-01 | Stop reason: HOSPADM

## 2020-01-01 RX ORDER — ONDANSETRON 2 MG/ML
4 INJECTION INTRAMUSCULAR; INTRAVENOUS EVERY 6 HOURS PRN
Status: DISCONTINUED | OUTPATIENT
Start: 2020-01-01 | End: 2020-01-01 | Stop reason: HOSPADM

## 2020-01-01 RX ORDER — MIRTAZAPINE 45 MG/1
45 TABLET, FILM COATED ORAL DAILY
COMMUNITY

## 2020-01-01 RX ORDER — MAGNESIUM SULFATE HEPTAHYDRATE 40 MG/ML
2 INJECTION, SOLUTION INTRAVENOUS AS NEEDED
Status: DISCONTINUED | OUTPATIENT
Start: 2020-01-01 | End: 2020-01-01 | Stop reason: HOSPADM

## 2020-01-01 RX ORDER — ATORVASTATIN CALCIUM 80 MG/1
80 TABLET, FILM COATED ORAL NIGHTLY
Status: DISCONTINUED | OUTPATIENT
Start: 2020-01-01 | End: 2020-01-01 | Stop reason: HOSPADM

## 2020-01-01 RX ORDER — PROPOFOL 10 MG/ML
100 VIAL (ML) INTRAVENOUS ONCE
Status: COMPLETED | OUTPATIENT
Start: 2020-01-01 | End: 2020-01-01

## 2020-01-01 RX ORDER — ACETAMINOPHEN 325 MG/1
650 TABLET ORAL EVERY 4 HOURS PRN
Status: DISCONTINUED | OUTPATIENT
Start: 2020-01-01 | End: 2020-01-01 | Stop reason: HOSPADM

## 2020-01-01 RX ORDER — POTASSIUM CHLORIDE 29.8 MG/ML
20 INJECTION INTRAVENOUS AS NEEDED
Status: DISCONTINUED | OUTPATIENT
Start: 2020-01-01 | End: 2020-01-01 | Stop reason: HOSPADM

## 2020-01-01 RX ORDER — SODIUM CHLORIDE 0.9 % (FLUSH) 0.9 %
10 SYRINGE (ML) INJECTION AS NEEDED
Status: DISCONTINUED | OUTPATIENT
Start: 2020-01-01 | End: 2020-01-01

## 2020-01-01 RX ORDER — NICOTINE POLACRILEX 4 MG
15 LOZENGE BUCCAL
Status: DISCONTINUED | OUTPATIENT
Start: 2020-01-01 | End: 2020-01-01 | Stop reason: HOSPADM

## 2020-01-01 RX ORDER — HEPARIN SODIUM 1000 [USP'U]/ML
INJECTION, SOLUTION INTRAVENOUS; SUBCUTANEOUS AS NEEDED
Status: DISCONTINUED | OUTPATIENT
Start: 2020-01-01 | End: 2020-01-01 | Stop reason: HOSPADM

## 2020-01-01 RX ORDER — LOPERAMIDE HYDROCHLORIDE 2 MG/1
2 CAPSULE ORAL EVERY 6 HOURS PRN
COMMUNITY

## 2020-01-01 RX ORDER — HEPARIN SODIUM 1000 [USP'U]/ML
1000 INJECTION, SOLUTION INTRAVENOUS; SUBCUTANEOUS ONCE
Status: DISCONTINUED | OUTPATIENT
Start: 2020-01-01 | End: 2020-01-01

## 2020-01-01 RX ORDER — METOPROLOL SUCCINATE 25 MG/1
25 TABLET, EXTENDED RELEASE ORAL DAILY
Status: DISCONTINUED | OUTPATIENT
Start: 2020-01-01 | End: 2020-01-01 | Stop reason: HOSPADM

## 2020-01-01 RX ORDER — PHENYLEPHRINE HYDROCHLORIDE 10 MG/ML
INJECTION INTRAVENOUS
Status: DISPENSED
Start: 2020-01-01 | End: 2020-01-01

## 2020-01-01 RX ORDER — PANTOPRAZOLE SODIUM 40 MG/10ML
40 INJECTION, POWDER, LYOPHILIZED, FOR SOLUTION INTRAVENOUS EVERY 24 HOURS
Status: DISCONTINUED | OUTPATIENT
Start: 2020-01-01 | End: 2020-01-01 | Stop reason: HOSPADM

## 2020-01-01 RX ADMIN — TAZOBACTAM SODIUM AND PIPERACILLIN SODIUM 3.38 G: 375; 3 INJECTION, SOLUTION INTRAVENOUS at 23:57

## 2020-01-01 RX ADMIN — Medication 0.3 MCG/KG/MIN: at 04:05

## 2020-01-01 RX ADMIN — VASOPRESSIN 0.03 UNITS/MIN: 20 INJECTION INTRAVENOUS at 01:26

## 2020-01-01 RX ADMIN — ALBUMIN HUMAN 12.5 G: 0.25 SOLUTION INTRAVENOUS at 14:57

## 2020-01-01 RX ADMIN — PHENYLEPHRINE HYDROCHLORIDE 3 MCG/KG/MIN: 10 INJECTION INTRAVENOUS at 13:57

## 2020-01-01 RX ADMIN — MIDODRINE HYDROCHLORIDE 10 MG: 5 TABLET ORAL at 17:55

## 2020-01-01 RX ADMIN — SODIUM CHLORIDE, PRESERVATIVE FREE 10 ML: 5 INJECTION INTRAVENOUS at 08:33

## 2020-01-01 RX ADMIN — Medication 1 APPLICATION: at 22:33

## 2020-01-01 RX ADMIN — METOPROLOL SUCCINATE 25 MG: 25 TABLET, EXTENDED RELEASE ORAL at 08:51

## 2020-01-01 RX ADMIN — MIDODRINE HYDROCHLORIDE 10 MG: 5 TABLET ORAL at 12:05

## 2020-01-01 RX ADMIN — ATORVASTATIN CALCIUM 10 MG: 20 TABLET, FILM COATED ORAL at 20:54

## 2020-01-01 RX ADMIN — SODIUM CHLORIDE, PRESERVATIVE FREE 10 ML: 5 INJECTION INTRAVENOUS at 21:06

## 2020-01-01 RX ADMIN — HEPARIN SODIUM 4100 UNITS: 1000 INJECTION INTRAVENOUS; SUBCUTANEOUS at 08:06

## 2020-01-01 RX ADMIN — TAZOBACTAM SODIUM AND PIPERACILLIN SODIUM 3.38 G: 375; 3 INJECTION, SOLUTION INTRAVENOUS at 15:59

## 2020-01-01 RX ADMIN — METOPROLOL SUCCINATE 25 MG: 25 TABLET, EXTENDED RELEASE ORAL at 08:26

## 2020-01-01 RX ADMIN — METOPROLOL SUCCINATE 25 MG: 25 TABLET, EXTENDED RELEASE ORAL at 10:36

## 2020-01-01 RX ADMIN — Medication 0.3 MCG/KG/MIN: at 13:56

## 2020-01-01 RX ADMIN — Medication 50 MEQ: at 22:54

## 2020-01-01 RX ADMIN — Medication 1 APPLICATION: at 08:26

## 2020-01-01 RX ADMIN — MIDODRINE HYDROCHLORIDE 10 MG: 5 TABLET ORAL at 06:33

## 2020-01-01 RX ADMIN — ONDANSETRON 4 MG: 2 INJECTION INTRAMUSCULAR; INTRAVENOUS at 11:43

## 2020-01-01 RX ADMIN — Medication 1 TABLET: at 18:36

## 2020-01-01 RX ADMIN — MIRTAZAPINE 45 MG: 30 TABLET, FILM COATED ORAL at 10:35

## 2020-01-01 RX ADMIN — BUDESONIDE AND FORMOTEROL FUMARATE DIHYDRATE 2 PUFF: 80; 4.5 AEROSOL RESPIRATORY (INHALATION) at 07:55

## 2020-01-01 RX ADMIN — HEPARIN SODIUM 4100 UNITS: 1000 INJECTION INTRAVENOUS; SUBCUTANEOUS at 16:24

## 2020-01-01 RX ADMIN — TAZOBACTAM SODIUM AND PIPERACILLIN SODIUM 3.38 G: 375; 3 INJECTION, SOLUTION INTRAVENOUS at 23:27

## 2020-01-01 RX ADMIN — SODIUM CHLORIDE, PRESERVATIVE FREE 10 ML: 5 INJECTION INTRAVENOUS at 08:53

## 2020-01-01 RX ADMIN — PHENYLEPHRINE HYDROCHLORIDE 3 MCG/KG/MIN: 10 INJECTION INTRAVENOUS at 01:25

## 2020-01-01 RX ADMIN — MEROPENEM 1 G: 1 INJECTION INTRAVENOUS at 17:39

## 2020-01-01 RX ADMIN — SODIUM CHLORIDE, PRESERVATIVE FREE 10 ML: 5 INJECTION INTRAVENOUS at 20:32

## 2020-01-01 RX ADMIN — CALCIUM CHLORIDE, MAGNESIUM CHLORIDE, SODIUM CHLORIDE, SODIUM BICARBONATE, POTASSIUM CHLORIDE AND SODIUM PHOSPHATE DIBASIC DIHYDRATE 2000 ML/HR: 3.68; 3.05; 6.34; 3.09; .314; .187 INJECTION INTRAVENOUS at 03:39

## 2020-01-01 RX ADMIN — SODIUM CHLORIDE 1000 ML: 9 INJECTION, SOLUTION INTRAVENOUS at 13:36

## 2020-01-01 RX ADMIN — PROPOFOL 100 MG: 10 INJECTION, EMULSION INTRAVENOUS at 22:54

## 2020-01-01 RX ADMIN — PHENYLEPHRINE HYDROCHLORIDE 2.5 MCG/KG/MIN: 10 INJECTION INTRAVENOUS at 16:10

## 2020-01-01 RX ADMIN — SODIUM CHLORIDE, PRESERVATIVE FREE 10 ML: 5 INJECTION INTRAVENOUS at 08:37

## 2020-01-01 RX ADMIN — BUDESONIDE AND FORMOTEROL FUMARATE DIHYDRATE 2 PUFF: 80; 4.5 AEROSOL RESPIRATORY (INHALATION) at 23:20

## 2020-01-01 RX ADMIN — PHENYLEPHRINE HYDROCHLORIDE 3 MCG/KG/MIN: 10 INJECTION INTRAVENOUS at 05:43

## 2020-01-01 RX ADMIN — MIRTAZAPINE 45 MG: 30 TABLET, FILM COATED ORAL at 08:26

## 2020-01-01 RX ADMIN — FENTANYL CITRATE 100 MCG: 50 INJECTION, SOLUTION INTRAMUSCULAR; INTRAVENOUS at 22:53

## 2020-01-01 RX ADMIN — MEROPENEM 1 G: 1 INJECTION INTRAVENOUS at 17:32

## 2020-01-01 RX ADMIN — VASOPRESSIN 0.03 UNITS/MIN: 20 INJECTION INTRAVENOUS at 12:16

## 2020-01-01 RX ADMIN — Medication 1 TABLET: at 17:55

## 2020-01-01 RX ADMIN — ERTAPENEM SODIUM 500 MG: 1 INJECTION, POWDER, LYOPHILIZED, FOR SOLUTION INTRAMUSCULAR; INTRAVENOUS at 18:25

## 2020-01-01 RX ADMIN — Medication 0.3 MCG/KG/MIN: at 09:45

## 2020-01-01 RX ADMIN — Medication 0.1 MCG/KG/MIN: at 22:54

## 2020-01-01 RX ADMIN — CALCIUM CHLORIDE, MAGNESIUM CHLORIDE, SODIUM CHLORIDE, SODIUM BICARBONATE, POTASSIUM CHLORIDE AND SODIUM PHOSPHATE DIBASIC DIHYDRATE 1000 ML/HR: 3.68; 3.05; 6.34; 3.09; .314; .187 INJECTION INTRAVENOUS at 03:01

## 2020-01-01 RX ADMIN — EPINEPHRINE 0.3 MCG/KG/MIN: 1 INJECTION INTRAMUSCULAR; INTRAVENOUS; SUBCUTANEOUS at 16:09

## 2020-01-01 RX ADMIN — TAZOBACTAM SODIUM AND PIPERACILLIN SODIUM 3.38 G: 375; 3 INJECTION, SOLUTION INTRAVENOUS at 16:55

## 2020-01-01 RX ADMIN — EPINEPHRINE 0.02 MCG/KG/MIN: 1 INJECTION INTRAMUSCULAR; INTRAVENOUS; SUBCUTANEOUS at 03:59

## 2020-01-01 RX ADMIN — MIRTAZAPINE 45 MG: 30 TABLET, FILM COATED ORAL at 08:45

## 2020-01-01 RX ADMIN — MIDODRINE HYDROCHLORIDE 10 MG: 5 TABLET ORAL at 11:48

## 2020-01-01 RX ADMIN — VANCOMYCIN HYDROCHLORIDE 1750 MG: 10 INJECTION, POWDER, LYOPHILIZED, FOR SOLUTION INTRAVENOUS at 18:17

## 2020-01-01 RX ADMIN — SODIUM CHLORIDE 1000 ML: 9 INJECTION, SOLUTION INTRAVENOUS at 19:08

## 2020-01-01 RX ADMIN — HYDROCODONE BITARTRATE AND ACETAMINOPHEN 1 TABLET: 5; 325 TABLET ORAL at 10:24

## 2020-01-01 RX ADMIN — SODIUM BICARBONATE 50 MEQ: 84 INJECTION, SOLUTION INTRAVENOUS at 04:10

## 2020-01-01 RX ADMIN — METOPROLOL SUCCINATE 25 MG: 25 TABLET, EXTENDED RELEASE ORAL at 08:45

## 2020-01-01 RX ADMIN — SODIUM CHLORIDE, PRESERVATIVE FREE 10 ML: 5 INJECTION INTRAVENOUS at 09:17

## 2020-01-01 RX ADMIN — EPINEPHRINE 0.3 MCG/KG/MIN: 1 INJECTION INTRAMUSCULAR; INTRAVENOUS; SUBCUTANEOUS at 12:20

## 2020-01-01 RX ADMIN — EPINEPHRINE 0.3 MCG/KG/MIN: 1 INJECTION INTRAMUSCULAR; INTRAVENOUS; SUBCUTANEOUS at 09:04

## 2020-01-01 RX ADMIN — MIRTAZAPINE 45 MG: 30 TABLET, FILM COATED ORAL at 08:52

## 2020-01-01 RX ADMIN — CLOPIDOGREL 75 MG: 75 TABLET, FILM COATED ORAL at 15:05

## 2020-01-01 RX ADMIN — MIDODRINE HYDROCHLORIDE 10 MG: 5 TABLET ORAL at 14:46

## 2020-01-01 RX ADMIN — ATORVASTATIN CALCIUM 80 MG: 80 TABLET, FILM COATED ORAL at 20:31

## 2020-01-01 RX ADMIN — MIDODRINE HYDROCHLORIDE 10 MG: 5 TABLET ORAL at 06:35

## 2020-01-01 RX ADMIN — PHENYLEPHRINE HYDROCHLORIDE 3 MCG/KG/MIN: 10 INJECTION INTRAVENOUS at 20:57

## 2020-01-01 RX ADMIN — MIDODRINE HYDROCHLORIDE 10 MG: 5 TABLET ORAL at 12:03

## 2020-01-01 RX ADMIN — HEPARIN SODIUM 4100 UNITS: 1000 INJECTION INTRAVENOUS; SUBCUTANEOUS at 17:35

## 2020-01-01 RX ADMIN — SODIUM BICARBONATE 150 MEQ/1,000 ML IN DEXTROSE 5 % INTRAVENOUS 150 MEQ: SOLUTION at 08:17

## 2020-01-01 RX ADMIN — DEXTROSE AND SODIUM CHLORIDE 75 ML/HR: 5; 450 INJECTION, SOLUTION INTRAVENOUS at 21:07

## 2020-01-01 RX ADMIN — MEROPENEM 1 G: 1 INJECTION, POWDER, FOR SOLUTION INTRAVENOUS at 12:06

## 2020-01-01 RX ADMIN — INSULIN LISPRO 2 UNITS: 100 INJECTION, SOLUTION INTRAVENOUS; SUBCUTANEOUS at 12:03

## 2020-01-01 RX ADMIN — SODIUM BICARBONATE 150 MEQ/1,000 ML IN DEXTROSE 5 % INTRAVENOUS 150 MEQ: SOLUTION at 15:02

## 2020-01-01 RX ADMIN — VANCOMYCIN HYDROCHLORIDE 1750 MG: 10 INJECTION, POWDER, LYOPHILIZED, FOR SOLUTION INTRAVENOUS at 08:38

## 2020-01-01 RX ADMIN — SODIUM CHLORIDE 1000 ML: 9 INJECTION, SOLUTION INTRAVENOUS at 10:21

## 2020-01-01 RX ADMIN — SODIUM CHLORIDE 1000 ML: 9 INJECTION, SOLUTION INTRAVENOUS at 18:06

## 2020-01-01 RX ADMIN — ATORVASTATIN CALCIUM 10 MG: 20 TABLET, FILM COATED ORAL at 22:31

## 2020-01-01 RX ADMIN — Medication 0.3 MCG/KG/MIN: at 04:11

## 2020-01-01 RX ADMIN — TAZOBACTAM SODIUM AND PIPERACILLIN SODIUM 3.38 G: 375; 3 INJECTION, SOLUTION INTRAVENOUS at 12:04

## 2020-01-01 RX ADMIN — SODIUM CHLORIDE 500 ML: 9 INJECTION, SOLUTION INTRAVENOUS at 22:19

## 2020-01-01 RX ADMIN — SODIUM BICARBONATE 150 MEQ/1,000 ML IN DEXTROSE 5 % INTRAVENOUS 150 MEQ: SOLUTION at 00:42

## 2020-01-01 RX ADMIN — HEPARIN SODIUM 4100 UNITS: 1000 INJECTION INTRAVENOUS; SUBCUTANEOUS at 06:43

## 2020-01-01 RX ADMIN — MIDODRINE HYDROCHLORIDE 10 MG: 5 TABLET ORAL at 17:58

## 2020-01-01 RX ADMIN — BUDESONIDE AND FORMOTEROL FUMARATE DIHYDRATE 2 PUFF: 80; 4.5 AEROSOL RESPIRATORY (INHALATION) at 20:14

## 2020-01-01 RX ADMIN — PHENYLEPHRINE HYDROCHLORIDE 3 MCG/KG/MIN: 10 INJECTION INTRAVENOUS at 10:22

## 2020-01-01 RX ADMIN — DEXTROSE MONOHYDRATE 50 ML: 25 INJECTION, SOLUTION INTRAVENOUS at 21:05

## 2020-01-01 RX ADMIN — MIDODRINE HYDROCHLORIDE 10 MG: 5 TABLET ORAL at 06:37

## 2020-01-01 RX ADMIN — PANTOPRAZOLE SODIUM 40 MG: 40 INJECTION, POWDER, FOR SOLUTION INTRAVENOUS at 12:31

## 2020-01-01 RX ADMIN — MIDODRINE HYDROCHLORIDE 10 MG: 5 TABLET ORAL at 18:25

## 2020-01-01 RX ADMIN — BUDESONIDE AND FORMOTEROL FUMARATE DIHYDRATE 2 PUFF: 80; 4.5 AEROSOL RESPIRATORY (INHALATION) at 21:46

## 2020-01-01 RX ADMIN — CALCIUM CHLORIDE, MAGNESIUM CHLORIDE, SODIUM CHLORIDE, SODIUM BICARBONATE, POTASSIUM CHLORIDE AND SODIUM PHOSPHATE DIBASIC DIHYDRATE 2000 ML/HR: 3.68; 3.05; 6.34; 3.09; .314; .187 INJECTION INTRAVENOUS at 03:40

## 2020-01-01 RX ADMIN — MIDODRINE HYDROCHLORIDE 10 MG: 5 TABLET ORAL at 17:32

## 2020-01-01 RX ADMIN — SODIUM CHLORIDE 500 ML: 9 INJECTION, SOLUTION INTRAVENOUS at 07:06

## 2020-01-01 RX ADMIN — ATORVASTATIN CALCIUM 10 MG: 20 TABLET, FILM COATED ORAL at 20:36

## 2020-01-01 RX ADMIN — Medication 500 UNITS: at 17:07

## 2020-01-01 RX ADMIN — MIDODRINE HYDROCHLORIDE 10 MG: 5 TABLET ORAL at 06:42

## 2020-01-01 RX ADMIN — TAZOBACTAM SODIUM AND PIPERACILLIN SODIUM 3.38 G: 375; 3 INJECTION, SOLUTION INTRAVENOUS at 00:24

## 2020-01-01 RX ADMIN — POTASSIUM CHLORIDE 40 MEQ: 10 CAPSULE, COATED, EXTENDED RELEASE ORAL at 17:58

## 2020-01-01 RX ADMIN — HEPARIN SODIUM 2100 UNITS: 1000 INJECTION INTRAVENOUS; SUBCUTANEOUS at 16:02

## 2020-01-01 RX ADMIN — ERTAPENEM SODIUM 500 MG: 1 INJECTION, POWDER, LYOPHILIZED, FOR SOLUTION INTRAMUSCULAR; INTRAVENOUS at 14:47

## 2020-01-01 RX ADMIN — SODIUM BICARBONATE 50 MEQ: 84 INJECTION, SOLUTION INTRAVENOUS at 22:54

## 2020-01-01 RX ADMIN — Medication 1 TABLET: at 18:25

## 2020-01-01 RX ADMIN — PHENYLEPHRINE HYDROCHLORIDE 0.5 MCG/KG/MIN: 10 INJECTION INTRAVENOUS at 19:20

## 2020-01-01 RX ADMIN — BUDESONIDE AND FORMOTEROL FUMARATE DIHYDRATE 2 PUFF: 80; 4.5 AEROSOL RESPIRATORY (INHALATION) at 07:35

## 2020-01-01 RX ADMIN — MIDODRINE HYDROCHLORIDE 10 MG: 5 TABLET ORAL at 10:38

## 2020-01-01 RX ADMIN — INSULIN LISPRO 2 UNITS: 100 INJECTION, SOLUTION INTRAVENOUS; SUBCUTANEOUS at 11:45

## 2020-01-01 RX ADMIN — PANTOPRAZOLE SODIUM 40 MG: 40 INJECTION, POWDER, FOR SOLUTION INTRAVENOUS at 12:05

## 2020-01-01 RX ADMIN — ALBUMIN HUMAN 12.5 G: 0.25 SOLUTION INTRAVENOUS at 13:50

## 2020-01-01 RX ADMIN — PHENYLEPHRINE HYDROCHLORIDE 2.3 MCG/KG/MIN: 10 INJECTION INTRAVENOUS at 06:49

## 2020-01-30 ENCOUNTER — OFFICE (OUTPATIENT)
Dept: URBAN - METROPOLITAN AREA CLINIC 66 | Facility: CLINIC | Age: 75
End: 2020-01-30

## 2020-01-30 VITALS
OXYGEN SATURATION: 92 % | HEART RATE: 74 BPM | SYSTOLIC BLOOD PRESSURE: 142 MMHG | HEIGHT: 65 IN | DIASTOLIC BLOOD PRESSURE: 70 MMHG | WEIGHT: 261 LBS

## 2020-01-30 DIAGNOSIS — R11.0 NAUSEA: ICD-10-CM

## 2020-01-30 DIAGNOSIS — K30 FUNCTIONAL DYSPEPSIA: ICD-10-CM

## 2020-01-30 DIAGNOSIS — R14.2 ERUCTATION: ICD-10-CM

## 2020-01-30 PROCEDURE — 99203 OFFICE O/P NEW LOW 30 MIN: CPT | Performed by: NURSE PRACTITIONER

## 2020-01-30 RX ORDER — PROMETHAZINE HYDROCHLORIDE 25 MG/1
100 TABLET ORAL
Qty: 40 | Refills: 0 | Status: ACTIVE
Start: 2020-01-30

## 2020-05-01 PROBLEM — T45.1X5A CHEMOTHERAPY-INDUCED THROMBOCYTOPENIA: Status: ACTIVE | Noted: 2020-01-01

## 2020-05-01 PROBLEM — I10 HYPERTENSION: Status: ACTIVE | Noted: 2020-01-01

## 2020-05-01 PROBLEM — N18.6 TYPE 2 DIABETES MELLITUS WITH CHRONIC KIDNEY DISEASE ON CHRONIC DIALYSIS, WITH LONG-TERM CURRENT USE OF INSULIN (HCC): Status: ACTIVE | Noted: 2020-01-01

## 2020-05-01 PROBLEM — Z79.4 TYPE 2 DIABETES MELLITUS WITH CHRONIC KIDNEY DISEASE ON CHRONIC DIALYSIS, WITH LONG-TERM CURRENT USE OF INSULIN (HCC): Status: ACTIVE | Noted: 2020-01-01

## 2020-05-01 PROBLEM — Z99.2 TYPE 2 DIABETES MELLITUS WITH CHRONIC KIDNEY DISEASE ON CHRONIC DIALYSIS, WITH LONG-TERM CURRENT USE OF INSULIN (HCC): Status: ACTIVE | Noted: 2020-01-01

## 2020-05-01 PROBLEM — Z99.2 ESRD ON DIALYSIS (HCC): Status: ACTIVE | Noted: 2020-01-01

## 2020-05-01 PROBLEM — C48.2 PRIMARY PERITONEAL CARCINOMATOSIS (HCC): Status: ACTIVE | Noted: 2020-01-01

## 2020-05-01 PROBLEM — R78.81 BACTEREMIA: Status: ACTIVE | Noted: 2020-01-01

## 2020-05-01 PROBLEM — Z86.718 PERSONAL HISTORY OF DVT (DEEP VEIN THROMBOSIS): Status: ACTIVE | Noted: 2020-01-01

## 2020-05-01 PROBLEM — D69.59 CHEMOTHERAPY-INDUCED THROMBOCYTOPENIA: Status: ACTIVE | Noted: 2020-01-01

## 2020-05-01 PROBLEM — E11.22 TYPE 2 DIABETES MELLITUS WITH CHRONIC KIDNEY DISEASE ON CHRONIC DIALYSIS, WITH LONG-TERM CURRENT USE OF INSULIN (HCC): Status: ACTIVE | Noted: 2020-01-01

## 2020-05-01 PROBLEM — I35.0 AORTIC STENOSIS: Status: ACTIVE | Noted: 2020-01-01

## 2020-05-01 PROBLEM — C57.4: Status: ACTIVE | Noted: 2020-01-01

## 2020-05-01 PROBLEM — J44.9 COPD (CHRONIC OBSTRUCTIVE PULMONARY DISEASE) (HCC): Status: ACTIVE | Noted: 2020-01-01

## 2020-05-01 PROBLEM — N18.6 ESRD ON DIALYSIS (HCC): Status: ACTIVE | Noted: 2020-01-01

## 2020-05-01 NOTE — PROGRESS NOTES
"Pharmacy to dose piperacillin/tazobactam: Initial Consult  Patient: Lucía Wise  : 1945  MRN: [id number]  Admit date: 2020 12:40 PM     Consult for Dr. Montague  Treating: Bacteremia, UTI    Anti-Infectives (From admission, onward)    Ordered     Dose/Rate Route Frequency Start Stop    20 1654  piperacillin-tazobactam (ZOSYN) 3.375 g in iso-osmotic dextrose 50 ml (premix)     Ordering Provider:  Leonardo Montague MD    3.375 g  over 4 Hours Intravenous Every 12 Hours 20 0000 20 2359    20 1644  Pharmacy to Dose Zosyn     Ordering Provider:  Leonardo Montague MD     Does not apply Continuous PRN 20 1639 20 1638    20 1340  piperacillin-tazobactam (ZOSYN) 3.375 g in iso-osmotic dextrose 50 ml (premix)     Ordering Provider:  Rupert Gavin MD    3.375 g  over 30 Minutes Intravenous Once 20 1342 20 1640           Relevant clinical data and objective history reviewed:  74 y.o. female 165.1 cm (65\") 88.5 kg (195 lb 1.6 oz)    Creatinine   Date Value Ref Range Status   2020 3.37 (H) 0.57 - 1.00 mg/dL Final     Estimated Creatinine Clearance: 16.1 mL/min (A) (by C-G formula based on SCr of 3.37 mg/dL (H)).    WBC   Date Value Ref Range Status   2020 6.88 3.40 - 10.80 10*3/mm3 Final     Temp Readings from Last 1 Encounters:   20 97.1 °F (36.2 °C) (Tympanic)       Baseline cultures/labs/radiology:  20 - Blood culture, port - in process  Two set of positive blood cultures that were obtained at [facility] - both with gram negative rods    Assessment/Plan:   -Based on patient's age, weight, renal function (end stage renal disease pt on hemodialysis),and site of infection, recommend piperacillin/tazobactam 3.375gm IV Q12h  -Follow up cultures to de-escalate antibiotic therapy as clinically appropriate  -Pharmacy will sign off consult as piperacillin/tazobactam regimen is now ordered, will continue to follow patient     Thank you for the " consult.    Please call if questions,    Monica Petersen, PharmD, BCPS  Clinical Pharmacy Specialist, Emergency Medicine  Ascom Phone: [phone]

## 2020-05-01 NOTE — PLAN OF CARE
Problem: Patient Care Overview  Goal: Plan of Care Review  Outcome: Ongoing (interventions implemented as appropriate)  Flowsheets (Taken 5/1/2020 5263)  Progress: no change  Plan of Care Reviewed With: patient  Outcome Summary: Patient admitted from ED after (+) blood cultures at long-term care facility/dialysis. Patient with numerous skin tears/wounds caused by moisture or brief, pictures taken. Incontinent of stool/urine, cleaned up after arrived from ED sitting in BM. Dialysis cath present/wrapped. Mediport accessed. Patient c/o pain with movement/cleaning up. Disoriented to time. Will attempt to get information regarding patient and home medications from facility.

## 2020-05-01 NOTE — ED NOTES
This RN attempted x2 for second set of cultures with no success.      Rachel Wheeler, RN  05/01/20 1559

## 2020-05-01 NOTE — ED NOTES
"Pt to ED with c/o \"they told me my port was infected\". Pt unable to state if feeling unwell but denied fevers. groshong cath to R upper chest, no redness or drainage noted to site at this time. Pt denies pain, n/v/d. Pt is a poor historian, states last dialysis performed and completed on Wednesday. \"still using cath access\".     Patient was placed in face mask in first look. Patient was wearing facemask when I entered the room and throughout our encounter. I wore full protective equipment throughout this patient encounter including a face mask, eye shield, and gloves. Hand hygiene was performed before donning protective equipment and after removal when leaving the room.        Rose Marie Fallon, RN  05/01/20 6274       Rose Marie Fallon RN  05/01/20 1729    "

## 2020-05-01 NOTE — ED PROVIDER NOTES
EMERGENCY DEPARTMENT ENCOUNTER    Room Number:  22/22  Date of encounter:  5/1/2020  PCP: Lorelei Campos DO  Historian: Pt/dialysis provider      HPI:  Chief Complaint: reported positive blood cultures  A complete HPI/ROS/PMH/PSH/SH/FH are unobtainable due to: limited historian  Context: Lucía Wise is a 74 y.o. female who presents to the ED for further evaluation and treatment of blood cultures positive x2.  Patient has ESRD with dialysis Monday Wednesday Friday.  Blood cultures were drawn at the Wednesday appointment, found to be positive with GNR's in both sets.  Patient denies any chest pain, shortness of breath, cough, fever, abdominal pain, nausea or vomiting.  She does note that she has poor appetite but this is not new.  Patient is a nursing home resident.    MEDICAL HISTORY REVIEW  ESRD with dialysis  Iron deficiency anemia  Secondary hyperparathyroidism    PAST MEDICAL HISTORY  Active Ambulatory Problems     Diagnosis Date Noted   • No Active Ambulatory Problems     Resolved Ambulatory Problems     Diagnosis Date Noted   • No Resolved Ambulatory Problems     No Additional Past Medical History         PAST SURGICAL HISTORY  No past surgical history on file.      FAMILY HISTORY  No family history on file.      SOCIAL HISTORY  Social History     Socioeconomic History   • Marital status: Single     Spouse name: Not on file   • Number of children: Not on file   • Years of education: Not on file   • Highest education level: Not on file         ALLERGIES  Ibuprofen and Aspirin        REVIEW OF SYSTEMS  Review of Systems     All systems reviewed and negative except for those discussed in HPI.       PHYSICAL EXAM    I have reviewed the triage vital signs and nursing notes.    ED Triage Vitals   Temp Heart Rate Resp BP SpO2   05/01/20 1240 05/01/20 1239 05/01/20 1239 05/01/20 1239 05/01/20 1239   97.1 °F (36.2 °C) 78 15 127/87 99 %      Temp src Heart Rate Source Patient Position BP Location FiO2 (%)    05/01/20 1240 05/01/20 1239 -- -- --   Tympanic Monitor          Physical Exam    Physical Exam   Constitutional: No distress.  Chronically ill-appearing but very pleasant.  HENT:  Head: Normocephalic and atraumatic.   Oropharynx: Mucous membranes are moist.   Eyes: No scleral icterus. No conjunctival pallor.  Neck: Painless range of motion noted. Neck supple.   Cardiovascular: Normal rate, regular rhythm and intact distal pulses.  Pulmonary/Chest: No respiratory distress. There are no wheezes, no rhonchi, and no rales.   Abdominal: Soft. There is no tenderness. There is no rebound and no guarding.   Musculoskeletal: Moves all extremities equally. There is moderate bilateral pedal edema but no calf tenderness.   Neurological: Alert.  Baseline strength and sensation noted.   Skin: Skin is pink, warm, and dry. No pallor.   Psychiatric: Mood and affect normal.   Nursing note and vitals reviewed.    LAB RESULTS  Recent Results (from the past 24 hour(s))   Urinalysis With Microscopic If Indicated (No Culture) - Urine, Catheter    Collection Time: 05/01/20  2:13 PM   Result Value Ref Range    Color, UA Dark Yellow (A) Yellow, Straw    Appearance, UA Turbid (A) Clear    pH, UA 7.0 5.0 - 8.0    Specific Gravity, UA 1.019 1.005 - 1.030    Glucose, UA Negative Negative    Ketones, UA 15 mg/dL (1+) (A) Negative    Bilirubin, UA Negative Negative    Blood, UA Negative Negative    Protein, UA >=300 mg/dL (3+) (A) Negative    Leuk Esterase, UA Moderate (2+) (A) Negative    Nitrite, UA Positive (A) Negative    Urobilinogen, UA 1.0 E.U./dL 0.2 - 1.0 E.U./dL   Urinalysis, Microscopic Only - Urine, Catheter    Collection Time: 05/01/20  2:13 PM   Result Value Ref Range    RBC, UA 3-5 (A) None Seen, 0-2 /HPF    WBC, UA Too Numerous to Count (A) None Seen, 0-2 /HPF    Bacteria, UA 2+ (A) None Seen /HPF    Squamous Epithelial Cells, UA 0-2 None Seen, 0-2 /HPF    Hyaline Casts, UA 3-6 None Seen /LPF    Methodology Manual Light  Microscopy    Comprehensive Metabolic Panel    Collection Time: 05/01/20  3:33 PM   Result Value Ref Range    Glucose 122 (H) 65 - 99 mg/dL    BUN 25 (H) 8 - 23 mg/dL    Creatinine 3.37 (H) 0.57 - 1.00 mg/dL    Sodium 142 136 - 145 mmol/L    Potassium 3.3 (L) 3.5 - 5.2 mmol/L    Chloride 97 (L) 98 - 107 mmol/L    CO2 27.9 22.0 - 29.0 mmol/L    Calcium 8.3 (L) 8.6 - 10.5 mg/dL    Total Protein 6.2 6.0 - 8.5 g/dL    Albumin 2.00 (L) 3.50 - 5.20 g/dL    ALT (SGPT) 18 1 - 33 U/L    AST (SGOT) 28 1 - 32 U/L    Alkaline Phosphatase 98 39 - 117 U/L    Total Bilirubin 0.8 0.2 - 1.2 mg/dL    eGFR  African Amer 16 (L) >60 mL/min/1.73    Globulin 4.2 gm/dL    A/G Ratio 0.5 g/dL    BUN/Creatinine Ratio 7.4 7.0 - 25.0    Anion Gap 17.1 (H) 5.0 - 15.0 mmol/L   Lipase    Collection Time: 05/01/20  3:33 PM   Result Value Ref Range    Lipase 19 13 - 60 U/L   Sedimentation Rate    Collection Time: 05/01/20  3:33 PM   Result Value Ref Range    Sed Rate 53 (H) 0 - 30 mm/hr   C-reactive Protein    Collection Time: 05/01/20  3:33 PM   Result Value Ref Range    C-Reactive Protein 23.64 (H) 0.00 - 0.50 mg/dL   CBC Auto Differential    Collection Time: 05/01/20  3:33 PM   Result Value Ref Range    WBC 6.88 3.40 - 10.80 10*3/mm3    RBC 3.26 (L) 3.77 - 5.28 10*6/mm3    Hemoglobin 9.2 (L) 12.0 - 15.9 g/dL    Hematocrit 26.7 (L) 34.0 - 46.6 %    MCV 81.9 79.0 - 97.0 fL    MCH 28.2 26.6 - 33.0 pg    MCHC 34.5 31.5 - 35.7 g/dL    RDW 20.7 (H) 12.3 - 15.4 %    RDW-SD 60.3 (H) 37.0 - 54.0 fl    Platelets 35 (C) 140 - 450 10*3/mm3       Ordered the above labs and independently reviewed the results.        RADIOLOGY  No Radiology Exams Resulted Within Past 24 Hours    I ordered the above noted radiological studies. Reviewed by me and discussed with radiologist.  See dictation for official radiology interpretation.      PROCEDURES    Procedures        MEDICATIONS GIVEN IN ER    Medications   sodium chloride 0.9 % flush 10 mL (has no administration in  time range)   sodium chloride 0.9 % flush 10 mL (has no administration in time range)   sodium chloride 0.9 % flush 10 mL (has no administration in time range)   sodium chloride 0.9 % flush 10 mL (has no administration in time range)   sodium chloride 0.9 % flush 20 mL (has no administration in time range)   heparin injection 500 Units (has no administration in time range)   piperacillin-tazobactam (ZOSYN) 3.375 g in iso-osmotic dextrose 50 ml (premix) (3.375 g Intravenous New Bag 5/1/20 1559)         PROGRESS, DATA ANALYSIS, CONSULTS, AND MEDICAL DECISION MAKING        All labs have been independently reviewed by me.  All radiology studies have been reviewed by me and discussed with radiologist dictating the report.   EKG's independently viewed and interpreted by me.  Discussion below represents my analysis of pertinent findings related to patient's condition, differential diagnosis, treatment plan and final disposition.      ED Course as of May 01 1632   Fri May 01, 2020   1344 Case reviewed with Radha Burgess at [facility].  She reports that the patient has been a little bit more confused than usual at dialysis on Wednesday with some reported chills though no recorded fevers.  Therefore, blood cultures were sent and found to be positive for GNR's in both sets.  She does report that the patient told her that the dialysis catheter dressing had gotten wet in the shower.    [RS]   1417 Nursing did note multiple superficial skin lesions throughout the groin when they were cleaning her.  No cellulitic change or discharge appreciated.    [RS]   1606 Plan admission.    [RS]   1631 Case reviewed with Dr. Montague who is agreeable to accept the patient for admission.    [RS]      ED Course User Index  [RS] Rupert Gavin MD       AS OF 16:32 VITALS:    BP - 134/77  HR - 62  TEMP - 97.1 °F (36.2 °C) (Tympanic)  O2 SATS - 96%        DIAGNOSIS  Final diagnoses:   Bacteremia   Acute UTI   Thrombocytopenia (CMS/HCC)   ESRD  (end stage renal disease) on dialysis (CMS/[facility])         DISPOSITION  ADMISSION    Discussed treatment plan and reason for admission with pt/family and admitting physician.  Pt/family voiced understanding of the plan for admission for further testing/treatment as needed.          Rupert Gavin MD  05/01/20 6505

## 2020-05-01 NOTE — ED NOTES
Nursing facility called to request paperwork and med list to be faxed over to us.     Rachel Wheeler, RN  05/01/20 7088

## 2020-05-01 NOTE — ED NOTES
IVT paged again due to no returned call. Pt brief and linens changed. Pt has multiple noted wounds to inner thigh, lower abdomen, and buttocks. MD called to come assess. Barrier cream applied.     Rachel Wheeler RN  05/01/20 7653

## 2020-05-01 NOTE — ED NOTES
[facility] at [facility] called EMS for two sets of positive blood cultures. Pt is currently A&O and at baseline.  Blood cultures were drawn due to persisting fever. NH reports pt also has had a mild cough. Mask present on pt upon arrival.        Tiarra Mcconnell, RN  05/01/20 6014

## 2020-05-02 NOTE — PROGRESS NOTES
LOS: 1 day     Name: Lucía Wise  Age/Sex: 74 y.o. female  :  1945        PCP: Lorelei Campos DO  Chief Complaint   Patient presents with   • Abnormal Lab      Subjective   She feels okay today other than still feeling fatigued and tired.  Her appetite is poor.  She denies any fevers this morning.  Rested decent overnight.  General: No Fever or Chills, Cardiac: No Chest Pain or Palpitations, Resp: No Cough or SOA, GI: No Nausea, Vomiting, or Diarrhea and Other: No bleeding      atorvastatin 10 mg Oral Daily   b complex-vitamin c-folic acid 1 tablet Oral Q PM   budesonide-formoterol 2 puff Inhalation BID - RT   insulin lispro 0-9 Units Subcutaneous 4x Daily With Meals & Nightly   metoprolol succinate XL 25 mg Oral Daily   midodrine 10 mg Oral TID AC   mirtazapine 45 mg Oral Daily   piperacillin-tazobactam 3.375 g Intravenous Q12H   sodium chloride 10 mL Intravenous Q12H          Objective   Vital Signs  Temp:  [97 °F (36.1 °C)-98.5 °F (36.9 °C)] 97.2 °F (36.2 °C)  Heart Rate:  [62-77] 67  Resp:  [16-18] 16  BP: (109-161)/() 150/69  Body mass index is 31.88 kg/m².    Intake/Output Summary (Last 24 hours) at 2020 1253  Last data filed at 2020 0900  Gross per 24 hour   Intake 270 ml   Output --   Net 270 ml       Physical Exam   Constitutional: She is oriented to person, place, and time. She appears well-developed and well-nourished.   HENT:   Head: Normocephalic and atraumatic.   Cardiovascular: Normal rate and regular rhythm.   Pulmonary/Chest: Effort normal and breath sounds normal.   Abdominal: Soft. Bowel sounds are normal.   Neurological: She is alert and oriented to person, place, and time.   Skin: Skin is warm and dry.   Psychiatric: She has a normal mood and affect. Her behavior is normal.   Nursing note and vitals reviewed.        Results Review:       I reviewed the patient's new clinical results.  Results from last 7 days   Lab Units 20  1134 20  6158  05/01/20  1533   WBC 10*3/mm3  --  8.12 6.88   HEMOGLOBIN g/dL  --  8.5* 9.2*   PLATELETS 10*3/mm3 36* 30* 35*     Results from last 7 days   Lab Units 05/02/20  0444 05/01/20  1533   SODIUM mmol/L 140 142   POTASSIUM mmol/L 3.0* 3.3*   CHLORIDE mmol/L 96* 97*   CO2 mmol/L 25.5 27.9   BUN mg/dL 31* 25*   CREATININE mg/dL 3.96* 3.37*   CALCIUM mg/dL 8.0* 8.3*   Estimated Creatinine Clearance: 13.6 mL/min (A) (by C-G formula based on SCr of 3.96 mg/dL (H)).  Lab Results   Component Value Date    HGBA1C 5.62 (H) 05/02/2020    HGBA1C 7.6 (H) 02/11/2020     Glucose   Date/Time Value Ref Range Status   05/02/2020 1114 154 (H) 70 - 130 mg/dL Final   05/02/2020 0612 121 70 - 130 mg/dL Final   05/01/2020 2243 102 70 - 130 mg/dL Final         Assessment/Plan     Gram-negative bacteremia    Type 2 diabetes mellitus with chronic kidney disease on chronic dialysis, with long-term current use of insulin (CMS/[facility])    Primary serous carcinoma of uterine adnexa (CMS/[facility])    Stage IVB serous primary peritoneal carcinoma    Hypertension    ESRD on dialysis (CMS/[facility])    COPD (chronic obstructive pulmonary disease) (CMS/[facility])    Personal history of DVT/PE    Chemotherapy-induced thrombocytopenia    Aortic stenosis      PLAN  This is a 74-year-old female with a history of stage IV peritoneal carcinoma followed in the outpatient setting by [facility] oncology, she presents to the hospital with fevers and chills, with positive blood cultures drawn on April 29 1.  Bacteremia: Plan to continue IV antibiotic therapy.  Appreciate infectious diseases input.  Repeat cultures have been drawn this morning.  2.  Hypokalemia: Defer replacement to nephrology.  Given her end-stage renal disease on dialysis hesitant to be too aggressive with replacement.  3.  End-stage renal disease: Nephrology consulted dialysis per their orders.  4.  COPD: Lungs are clear no signs of exacerbation  5.  Type 2 diabetes: Continue sliding scale insulin adjust as  needed.  6.  History of DVT/PE and IVC filter placement: She does not have a blood thinner listed on her medication reconciliation.  I have asked for nursing to clarify with the patient and the patient's pharmacy if she is to be on an anticoagulant.  Review of [facility] from [facility] on 4/21 has her on warfarin 2 mg a day.      Disposition  To be determined      Elliot Berry MD  [facility]  05/02/20  12:53

## 2020-05-02 NOTE — PLAN OF CARE
Problem: Patient Care Overview  Goal: Plan of Care Review  Outcome: Ongoing (interventions implemented as appropriate)  Flowsheets (Taken 5/2/2020 1661)  Progress: improving  Plan of Care Reviewed With: patient  Outcome Summary: VSS. BG wnl. IV fluids running KVO for IV antibitoics. incontinent of bowel and bladder. Gyn Onc to see tomorrow.  Goal: Individualization and Mutuality  Outcome: Ongoing (interventions implemented as appropriate)  Goal: Discharge Needs Assessment  Outcome: Ongoing (interventions implemented as appropriate)  Goal: Interprofessional Rounds/Family Conf  Outcome: Ongoing (interventions implemented as appropriate)

## 2020-05-02 NOTE — PLAN OF CARE
Problem: Patient Care Overview  Goal: Plan of Care Review  Flowsheets (Taken 5/2/2020 3872)  Progress: no change  Plan of Care Reviewed With: patient  Note:   VSS.  Denies pain.  Multiple skin lesion/tears abdomen, groin and thighs.  Wound RN to see.  To be seen by ID, CBC  and nephrology doctors today.  Mediport with good blood return , blood specimen obtained and sent to lab.

## 2020-05-02 NOTE — CONSULTS
Subjective     REASON FOR CONSULTATION:   1.  Stage IV primary peritoneal carcinomatosis followed by Dr. Dr. Fitzpatrick at [facility], s/p cycle 2 of chemotherapy Taxol, carboplatinum as of 4/28/2020.    2.  Sepsis with gram-negative bacteremia admitted for IV antibiotics, on Zosyn    3.  Chronic skin changes on the lower abdomen and buttocks, wound care following    Oncology History:     Stage IVB serous primary peritoneal carcinoma   General Cancer History   Antonieta Wise is a 74 yr/o female referral by Dr. 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    2/11/20 CT C/A/P: Left lower lobe atelectasis or infiltrate and trace of left pleural fluid. Trace of pericardial fluid. Not mentioned above are some small nodular densities seen in the epicardial fat could represent reactive or metastatic lymph nodes. Moderate ascites. Nodular thickening involving the peritoneal cavity concerning for peritoneal carcinomatosis. Diverticulosis, possible cholelithiasis versus sludge, atherosclerotic changes, bilateral renal cysts and nonobstructing left renal calculi and other incidental findings as described above. On further review, there is also some mild retroperitoneal and bilateral external iliac adenopathy which could represent metastatic disease as well.  2/11/20 Paracentesis: 1320 mL of clear, straw-colored ascites was obtained.   PATHOLOGY: High-grade serous carcinoma  2/11/20 Omental Biopsy: High grade serous carcinoma, most compatible with mullerian origin. CK7, PAX8, p16, p53, WT1 and CA-125 positive, CK20 negative.  2/12/20 CT C: Mediastinal and bilateral supraclavicular adenopathy, likely metastatic given the context. Small left pleural effusion. Chronic thyroid masses, incompletely characterized but less likely to be responsible for the patient's other malignancy given their stability.  2/20/20 VQ scan: High probability for pulmonary embolism. Bronchospasm.  2/2020 US LE:  Right lower extremity with evidence of acute occlusive deep vein thrombus in the popliteal vein. No evidence for superficial vein thrombus.   Left lower extremity with evidence of acute occlusive deep vein thrombus in the common femoral vein at the saphenofemoral junction. Evidence for superficial vein thrombus in the great saphenous vein.  3/31/20- current: Taxol 175mg/l4=216pm, Carboplatin HNF9=911dz, every 21 days x1c    Provide an opinion on any further workup or treatment                             REQUESTING PHYSICIAN: Dr. Berry    RECORDS OBTAINED:  Records of the patients history including those obtained from the referring provider were reviewed and summarized in detail.    HISTORY OF PRESENT ILLNESS:  The patient is a 74 y.o. year old female who is here for an opinion about the above issue.    History of Present Illness patient is a 74-year-old female with history of stage IVb serous primary peritoneal carcinoma.  She has been followed by Dr. Lorelei Lizama at [facility].   Patient is receiving chemotherapy and was last seen by Dr. Joel on April 21, 2020 for her stage IVb serous primary peritoneal carcinomatosis at which time she received cycle 2 chemotherapy.  She has a history of DVT with IVC filter in place as well as end-stage renal disease on hemodialysis.  She developed fever during dialysis on April 29, 2020 and blood cultures were drawn.  She now has gram-negative rods and she was told to go to the ER.  Patient has been placed on Zosyn.  Infectious disease Dr. Muñoz is seeing the patient.    She was admitted for gram-negative bacteria.  Blood cultures on May 1 showed 2 out of 2 gram-negative rods.  Infectious disease is concerned that she could have had neutropenic sepsis from chemotherapy or line infection.  Chest x-ray has been ordered.  In order to make sure there is no septic pulmonary emboli.    She gets her dialysis Monday Wednesday Friday.  She lives in a nursing home.  She  denies nausea vomiting on admission.  She is still making some urine.  Did not denies any dysuria or frequency of urination.  She denied any redness around the port site.  Nephrology has been consulted.  She has been on Coumadin in the past for DVT pulmonary embolism.    Her CBC shows a hemoglobin of 8.5 white count of 8.12 platelet count of 30 K.,  Haptoglobin is 276, ferritin 1000 108, , INR of 1.38 BUN of 25 and creatinine of 3.37, lipase of 19, ESR of 53, C-reactive protein of 23.    Patient has no active bleeding on the skin site or anywhere.  No GI bleeding.  Nephrology is to see patient.    Past Medical History:   Diagnosis Date   • Acute kidney failure (CMS/HCC)    • Acute renal failure on dialysis (CMS/HCC)    • Anemia    • Anxiety    • Cancer (CMS/HCC)     perotineal cancer   • Depression    • Diabetes (CMS/HCC)    • DVT (deep venous thrombosis) (CMS/HCC)    • ESRD (end stage renal disease) (CMS/HCC)    • Family history of peritoneal cancer    • GERD (gastroesophageal reflux disease)    • History of transfusion    • Hyperparathyroidism (CMS/HCC)    • Hypertension    • Kidney stones    • Maintenance chemotherapy     last chemo was april 28/2020 (2nd dose) every 3 weeks for 6 doses   • Protein-calorie malnutrition (CMS/HCC)    • Pulmonary embolism (CMS/HCC)     Acute   • Uremia    • Vitamin D deficiency    • Weakness generalized         History reviewed. No pertinent surgical history.     No current facility-administered medications on file prior to encounter.      Current Outpatient Medications on File Prior to Encounter   Medication Sig Dispense Refill   • acetaminophen (TYLENOL) 325 MG tablet Take 650 mg by mouth Every 6 (Six) Hours As Needed for Mild Pain , Headache or Fever.     • atorvastatin (LIPITOR) 10 MG tablet Take 10 mg by mouth Daily.     • B Complex-C-Folic Acid (YESY-JEFF) tablet Take 1 tablet by mouth Every Evening.     • bismuth subsalicylate (PEPTO BISMOL) 262 MG/15ML suspension Take  15 mL by mouth Every 8 (Eight) Hours As Needed for Indigestion.     • cloNIDine (CATAPRES) 0.1 MG tablet Take 0.2 mg by mouth As Needed for High Blood Pressure (at HD if SBP > 200 or SBP > 100, Do not give if HR < 65).     • epoetin emy (EPOGEN,PROCRIT) 3000 UNIT/ML injection Inject 6,200 Units under the skin into the appropriate area as directed 3 (Three) Times a Week. Route is INTRAVENOUS per [facility] paperwork (would not let it be entered that way)     • Fluticasone-Salmeterol (ADVAIR HFA IN) Inhale 1 puff 2 (Two) Times a Day. 45-21 mcg     • HYDROcodone-acetaminophen (NORCO) 5-325 MG per tablet Take 1 tablet by mouth Every 6 (Six) Hours As Needed for Moderate Pain  or Severe Pain .     • insulin aspart (novoLOG) 100 UNIT/ML injection Inject  under the skin into the appropriate area as directed 3 (Three) Times a Day Before Meals. Sliding scale insulin, scale not listed on paperwork     • iron sucrose (VENOFER) 20 MG/ML injection Infuse 100 mg into a venous catheter 3 (Three) Times a Week.     • lidocaine (LIDODERM) 5 % Place 1 patch on the skin as directed by provider Daily. Remove & Discard patch within 12 hours or as directed by MD     • loperamide (IMODIUM) 2 MG capsule Take 2 mg by mouth Every 6 (Six) Hours As Needed for Diarrhea.     • metoprolol succinate XL (TOPROL-XL) 25 MG 24 hr tablet Take 25 mg by mouth Daily.     • midodrine (PROAMATINE) 10 MG tablet Take 10 mg by mouth 3 (Three) Times a Day Before Meals.     • mirtazapine (REMERON) 45 MG tablet Take 45 mg by mouth Daily.     • nitroglycerin (NITROSTAT) 0.4 MG SL tablet Place 0.4 mg under the tongue Every 5 (Five) Minutes As Needed for Chest Pain. Take no more than 3 doses in 15 minutes.     • potassium phosphate, monobasic, (K-PHOS) 500 MG tablet Take 500 mg by mouth Daily. Only mon and Friday 1 tablet only  Before dialysis till  05/04/20 only.     • promethazine (PHENERGAN) 25 MG tablet Take 12.5-25 mg by mouth Every 6 (Six) Hours As Needed for Nausea or  Vomiting.          ALLERGIES:    Allergies   Allergen Reactions   • Ibuprofen Other (See Comments)     Kidney failure   • Aspirin Unknown - Low Severity        Social History     Socioeconomic History   • Marital status: Single     Spouse name: Not on file   • Number of children: Not on file   • Years of education: Not on file   • Highest education level: Not on file   Tobacco Use   • Smoking status: Former Smoker     Types: Cigarettes   Substance and Sexual Activity   • Alcohol use: Never     Frequency: Never     Comment: not seen from other facility records   • Drug use: Never     Comment: not seen from other facility records   • Sexual activity: Defer        History reviewed. No pertinent family history.     Review of Systems   Constitutional: Positive for fatigue and fever. Negative for appetite change, chills, diaphoresis and unexpected weight change.   HENT: Negative for hearing loss, sore throat and trouble swallowing.    Respiratory: Negative for cough, chest tightness, shortness of breath and wheezing.    Cardiovascular: Negative for chest pain, palpitations and leg swelling.   Gastrointestinal: Negative for abdominal distention, abdominal pain, constipation, diarrhea, nausea and vomiting.   Genitourinary: Negative for dysuria, frequency, hematuria and urgency.   Musculoskeletal: Negative for joint swelling.        No muscle weakness.   Skin: Negative for rash and wound.   Neurological: Negative for seizures, syncope, speech difficulty, weakness, numbness and headaches.   Hematological: Negative for adenopathy. Does not bruise/bleed easily.   Psychiatric/Behavioral: Negative for behavioral problems, confusion and suicidal ideas.   Patient complained of chills on admission which is resolved.  She has skin lesions which are chronic on the lower abdomen    Objective     Vitals:    05/02/20 0223 05/02/20 0600 05/02/20 0800 05/02/20 1039   BP: 109/68 114/71  150/69   BP Location: Left arm Left arm  Left arm    Patient Position: Lying Lying  Lying   Pulse: 66 69 74 67   Resp: 16 16 16 16   Temp: 97.3 °F (36.3 °C) 97 °F (36.1 °C)  97.2 °F (36.2 °C)   TempSrc: Oral Oral  Oral   SpO2: 100% 99% 98% 98%   Weight:  86.9 kg (191 lb 9.3 oz)     Height:         No flowsheet data found.    Physical Exam    RESPIRATORY:  Normal respiratory effort.  Lungs clear to auscultation bilaterally.  CARDIOVASCULAR:  Normal S1, S2.  No murmurs rubs or gallops.  No significant lower extremity edema.  GASTROINTESTINAL: Abdomen appears unremarkable.  Nontender.  No hepatomegaly.  No splenomegaly.  LYMPHATIC:  No cervical, supraclavicular, axillary lymphadenopathy.  SKIN:  Warm.  No rashes.  PSYCHIATRIC:  Normal judgment and insight.  Normal mood and affect.  Patient has skin changes on the abdomen which appear to be chronic and also the buttock    RECENT LABS:  Hematology WBC   Date Value Ref Range Status   05/02/2020 8.12 3.40 - 10.80 10*3/mm3 Final     RBC   Date Value Ref Range Status   05/02/2020 3.12 (L) 3.77 - 5.28 10*6/mm3 Final     Hemoglobin   Date Value Ref Range Status   05/02/2020 8.5 (L) 12.0 - 15.9 g/dL Final     Hematocrit   Date Value Ref Range Status   05/02/2020 25.1 (L) 34.0 - 46.6 % Final     Platelets   Date Value Ref Range Status   05/02/2020 30 (C) 140 - 450 10*3/mm3 Final          Assessment/Plan     1.  Stage IV primary peritoneal carcinomatosis followed by Dr. Dr. Fitzpatrick at [facility], s/p cycle 2 of chemotherapy Taxol, carboplatinum as of 4/29/2020.    2.  Sepsis with gram-negative bacteremia admitted for IV antibiotics, on Zosyn.  Infectious disease following    3.  Anemia and thrombocytopenia secondary to chemotherapy, no active bleeding.  Peripheral smear reviewed    Plan  1. Await cultures  2. Continue antibiotics  3. Infectious disease following  4. Patient does not neutropenic, will check differential on the CBC  5. Hemoglobin stable at 8.5  6. At discharge patient will need to follow-up with her GYN  oncologist at [facility]  7. Will follow in the hospital.  8. Please consult GYN oncology as sometimes they will come here to see patients,     Sudha Champion MD

## 2020-05-02 NOTE — CONSULTS
Referring Provider: Leonardo Montague MD    Subjective   History of present illness:    This very nice 74-year-old we are asked for evaluation opinion regarding bacteremia.    Per review of the past medical records, the patient is followed by Lorelei Lizama DO for stage IVb serous primary peritoneal carcinoma.  She has been receiving carboplatin/Taxol and received cycle #2 on 4/21/2020.  She also has a history of DVT with IVC filter in place as well as ES RD on hemodialysis.  Apparently she developed fever during dialysis on 4/29/2020 and had blood cultures drawn.  These are now growing gram-negative rods and so she was told to come to the emergency department.  She denies any complaints at this time and has been afebrile.    I discussed with dialysis center and no update on blood cultures as of yet.  They are still awaiting identification and susceptibilities.  Patient has been placed on Zosyn renally dosed    She has not had any cardiopulmonary symptoms, problems with her left port or right tunneled dialysis catheter, GI/ symptoms.  She has chronic arthralgia in the left knee which is unchanged.    Past medical history: ESRD, stage IVb serous primary peritoneal carcinoma, DVT, probable PE, heparin-induced thrombocytopenia, diabetes mellitus type 2, hyperlipidemia, osteoarthritis, hypertension, hyperparathyroidism, nephrolithiasis    No family history infectious diseases  Social history: She normal lives with her daughter here in [place], [place] but is currently at a rehab facility.        Allergies   Allergen Reactions   • Ibuprofen Other (See Comments)     Kidney failure   • Aspirin Unknown - Low Severity       Review of Systems  Pertinent items are noted in HPI, all other systems reviewed and negative    Objective     Physical Exam:   Vital Signs   Temp:  [97 °F (36.1 °C)-98.5 °F (36.9 °C)] 97 °F (36.1 °C)  Heart Rate:  [62-78] 69  Resp:  [15-18] 16  BP: (109-161)/() 114/71    GENERAL: Awake and  alert, in no acute distress.   HEENT: Oropharynx is clear. Hearing is grossly normal.   EYES: PERRL. No conjunctival injection. No lid lag.   LYMPHATICS: No lymphadenopathy of the neck or inguinal regions.   HEART: Regular rate and regular rhythm. No peripheral edema.   LUNGS: Clear to auscultation anteriorly with normal respiratory effort.   GI: Soft, nontender, nondistended. No appreciable organomegaly.   SKIN: Warm and dry without cutaneous eruptions left port in place and right tunneled dialysis catheter in place  PSYCHIATRIC: Appropriate mood, affect.  Limited insight, and judgment.     Results Review:  Cr 3.96  A1c 5.6  WBC 8.1   PLT 30  Hgb 8.5    Microbiology:  5/1 BCx NGTD  4/29/2020 dialysis blood cultures 2/2 gram-negative rods    Radiology: none      Assessment/Plan   1.  Gram-negative bacteremia  2.  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  End-stage renal disease    I suspect that she either had neutropenic sepsis from her chemotherapy or has a line infection.  Fortunately she seems pretty well compensated at this time.  I will check a chest x-ray just to make sure that there is no evidence of septic pulmonary emboli from a line infection.  I would also like to get another set of blood cultures.  Cont zosyn     Thank you for this consult.  We will continue to follow along and tailor antibiotics as the patient's clinical course evolves.      Eamon Vyas MD  05/02/20  7:24 AM

## 2020-05-02 NOTE — H&P
Patient Name:  Lucía Wise  YOB: 1945  MRN:  [id number]  Admit Date:  5/1/2020  Patient Care Team:  Lorelei Campos DO as PCP - General (Internal Medicine)      Subjective   History Present Illness     Chief Complaint   Patient presents with   • Abnormal Lab       Ms. Wise is a 74 y.o. female with a history of ESRD on HD that presents to [facility] complaining of being told she had abnormal blood cultures.  She is not the greatest of historians.  She lives in a nursing home.  She gets dialysis on Monday, Wednesday and Friday.  She had normal dialysis on Wednesday but for reasons she cannot really explain to me they decided to draw blood cultures at that time.  She denies having any fever or chills.  She does admit that she has been weak and overall not feeling well but really is not any more specific than that.  She went back for dialysis today and was prompted to the ER after review of her blood cultures showed gram-negative rods in both sets.  Again patient denies any fever or chills.  She specifically denies abdominal pain.  No change in her bowel habits.  No nausea or vomiting.  She still makes urine but has had no dysuria or change in frequency of urination.  She has no back or flank pain.  She has a port in her left upper chest wall and denies any issues with this.  She states that she gets chemotherapy but cannot tell me for what type of cancer.  She states chemotherapy was yesterday.  She does not know who her oncologist is.      History of Present Illness  Review of Systems   Constitutional: Positive for appetite change, chills and fatigue.   HENT: Negative.    Respiratory: Negative.  Negative for cough and shortness of breath.    Cardiovascular: Negative.    Gastrointestinal: Negative.    Endocrine: Negative.    Genitourinary: Negative.    Musculoskeletal: Negative.    Skin: Negative.    Neurological: Positive for weakness.   Hematological: Negative.       Psychiatric/Behavioral: Negative.         Personal History     Surgical History  Surgery Date Site/Laterality Comments    SECTION          APPENDECTOMY 1960 - 1960   open     CARDIAC CATHETERIZATION 2012   [employer]     JOINT REPLACEMENT 2008 - 2008   left knee      JOINT REPLACEMENT 2003 - 2003   right knee     CYSTOSCOPY W/ URETERAL STENT PLACEMENT 2017 - 2017          Medical History  Medical History Date Comments   Arthritis       Diabetes mellitus (CMS/formerly [facility])       HTN (hypertension)       Snoring       Obesity       GERD (gastroesophageal reflux disease)       Murmur       Stroke (CMS/formerly [facility])  Balance problems   Depression       Hyperlipidemia       Kidney stones       Stage IVB serous primary peritoneal carcinoma 2020     PE (pulmonary thromboembolism) (CMS/formerly [facility])       DVT (deep venous thrombosis) (CMS/formerly [facility])       ESRD (end stage renal disease) (CMS/formerly [facility])       DM (diabetes mellitus) (CMS/formerly [facility])       COPD (chronic obstructive pulmonary disease) (CMS/formerly [facility])       Nausea & vomiting         Family History  Medical History Relation Name Comments   Diabetes Brother       Cancer, Other or Unknown Type Father    from lung CA   Diabetes Father       Stroke Maternal Grandmother       Diabetes Mother       Heart disease Mother       Hyperlipidemia Mother       Hypertension Mother       Pneumonia Sister         Social History  Tobacco Use Types Packs/Day Years Used Date   Former Smoker Cigarettes 0.5 15 Quit: 1993   Smokeless Tobacco: Never Used           Tobacco Cessation: Counseling Given: No     Alcohol Use Drinks/Week oz/Week Comments   No             No current facility-administered medications on file prior to encounter.      Current Outpatient Medications on File Prior to Encounter   Medication Sig Dispense Refill   • acetaminophen (TYLENOL) 325 MG tablet Take 650 mg by mouth Every 6 (Six) Hours As Needed for Mild Pain , Headache or Fever.     •  atorvastatin (LIPITOR) 10 MG tablet Take 10 mg by mouth Daily.     • B Complex-C-Folic Acid (YESY-JEFF) tablet Take 1 tablet by mouth Every Evening.     • bismuth subsalicylate (PEPTO BISMOL) 262 MG/15ML suspension Take 15 mL by mouth Every 8 (Eight) Hours As Needed for Indigestion.     • cloNIDine (CATAPRES) 0.1 MG tablet Take 0.2 mg by mouth As Needed for High Blood Pressure (at HD if SBP > 200 or SBP > 100, Do not give if HR < 65).     • epoetin emy (EPOGEN,PROCRIT) 3000 UNIT/ML injection Inject 6,200 Units under the skin into the appropriate area as directed 3 (Three) Times a Week. Route is INTRAVENOUS per [facility] paperwork (would not let it be entered that way)     • Fluticasone-Salmeterol (ADVAIR HFA IN) Inhale 1 puff 2 (Two) Times a Day.     • HYDROcodone-acetaminophen (NORCO) 5-325 MG per tablet Take 1 tablet by mouth Every 6 (Six) Hours As Needed for Moderate Pain  or Severe Pain .     • insulin aspart (novoLOG) 100 UNIT/ML injection Inject  under the skin into the appropriate area as directed 3 (Three) Times a Day Before Meals. Sliding scale insulin, scale not listed on paperwork     • iron sucrose (VENOFER) 20 MG/ML injection Infuse 100 mg into a venous catheter 3 (Three) Times a Week.     • lidocaine (LIDODERM) 5 % Place 1 patch on the skin as directed by provider Daily. Remove & Discard patch within 12 hours or as directed by MD     • metoprolol succinate XL (TOPROL-XL) 25 MG 24 hr tablet Take 25 mg by mouth Daily.     • midodrine (PROAMATINE) 10 MG tablet Take 10 mg by mouth 3 (Three) Times a Day Before Meals.     • mirtazapine (REMERON) 45 MG tablet Take 45 mg by mouth Daily.     • nitroglycerin (NITROSTAT) 0.4 MG SL tablet Place 0.4 mg under the tongue Every 5 (Five) Minutes As Needed for Chest Pain. Take no more than 3 doses in 15 minutes.     • promethazine (PHENERGAN) 25 MG tablet Take 12.5-25 mg by mouth Every 6 (Six) Hours As Needed for Nausea or Vomiting.       Allergies   Allergen Reactions   •  Ibuprofen Other (See Comments)     Kidney failure   • Aspirin Unknown - Low Severity       Objective    Objective     Vital Signs  Temp:  [97.1 °F (36.2 °C)-97.7 °F (36.5 °C)] 97.7 °F (36.5 °C)  Heart Rate:  [62-78] 77  Resp:  [15-18] 16  BP: (117-161)/() 133/79  SpO2:  [91 %-100 %] 91 %  on   ;   Device (Oxygen Therapy): room air  Body mass index is 32.47 kg/m².    Physical Exam   Constitutional: She is oriented to person, place, and time. She appears well-developed and well-nourished. No distress.   HENT:   Head: Normocephalic and atraumatic.   Eyes: Conjunctivae and EOM are normal. No scleral icterus.   Neck: Normal range of motion. No JVD present.   Cardiovascular: Normal rate and regular rhythm.   Murmur heard.  Port L chest wall   Pulmonary/Chest: Effort normal and breath sounds normal.   Abdominal: Soft. Bowel sounds are normal. She exhibits no distension. There is no tenderness.   Musculoskeletal: Normal range of motion. She exhibits no edema.   Neurological: She is alert and oriented to person, place, and time.   Skin: Skin is warm and dry.   Psychiatric: She has a normal mood and affect. Her behavior is normal. Cognition and memory are impaired.   Nursing note and vitals reviewed.      Results Review:  I reviewed the patient's new clinical results.  I reviewed the patient's new imaging results and agree with the interpretation.  I reviewed the patient's other test results and agree with the interpretation  I personally viewed and interpreted the patient's EKG/Telemetry data  Discussed with ED provider.    Lab Results (last 24 hours)     Procedure Component Value Units Date/Time    Urinalysis With Microscopic If Indicated (No Culture) - Urine, Catheter [[id number]]  (Abnormal) Collected:  05/01/20 1413    Specimen:  Urine, Catheter Updated:  05/01/20 1442     Color, UA Dark Yellow     Appearance, UA Turbid     pH, UA 7.0     Specific Gravity, UA 1.019     Glucose, UA Negative     Ketones, UA 15 mg/dL  (1+)     Bilirubin, UA Negative     Blood, UA Negative     Protein, UA >=300 mg/dL (3+)     Leuk Esterase, UA Moderate (2+)     Nitrite, UA Positive     Urobilinogen, UA 1.0 E.U./dL    Urinalysis, Microscopic Only - Urine, Catheter [[id number]]  (Abnormal) Collected:  05/01/20 1413    Specimen:  Urine, Catheter Updated:  05/01/20 1443     RBC, UA 3-5 /HPF      WBC, UA Too Numerous to Count /HPF      Bacteria, UA 2+ /HPF      Squamous Epithelial Cells, UA 0-2 /HPF      Hyaline Casts, UA 3-6 /LPF      Methodology Manual Light Microscopy    CBC & Differential [[id number]] Collected:  05/01/20 1533    Specimen:  Blood Updated:  05/01/20 1549    Narrative:       The following orders were created for panel order CBC & Differential.  Procedure                               Abnormality         Status                     ---------                               -----------         ------                     CBC Auto Differential[[id number]]        Abnormal            Final result                 Please view results for these tests on the individual orders.    Comprehensive Metabolic Panel [[id number]]  (Abnormal) Collected:  05/01/20 1533    Specimen:  Blood Updated:  05/01/20 1604     Glucose 122 mg/dL      BUN 25 mg/dL      Creatinine 3.37 mg/dL      Sodium 142 mmol/L      Potassium 3.3 mmol/L      Chloride 97 mmol/L      CO2 27.9 mmol/L      Calcium 8.3 mg/dL      Total Protein 6.2 g/dL      Albumin 2.00 g/dL      ALT (SGPT) 18 U/L      AST (SGOT) 28 U/L      Alkaline Phosphatase 98 U/L      Total Bilirubin 0.8 mg/dL      eGFR  African Amer 16 mL/min/1.73      Globulin 4.2 gm/dL      A/G Ratio 0.5 g/dL      BUN/Creatinine Ratio 7.4     Anion Gap 17.1 mmol/L     Narrative:       GFR Normal >60  Chronic Kidney Disease <60  Kidney Failure <15      Lipase [[id number]]  (Normal) Collected:  05/01/20 1533    Specimen:  Blood Updated:  05/01/20 1604     Lipase 19 U/L     Sedimentation Rate [[id number]]  (Abnormal) Collected:  05/01/20  1533    Specimen:  Blood Updated:  05/01/20 1611     Sed Rate 53 mm/hr     C-reactive Protein [[id number]]  (Abnormal) Collected:  05/01/20 1533    Specimen:  Blood Updated:  05/01/20 1604     C-Reactive Protein 23.64 mg/dL     Blood Culture - Blood, Blood, Central Line [[id number]] Collected:  05/01/20 1533    Specimen:  Blood, Central Line Updated:  05/01/20 1537    CBC Auto Differential [[id number]]  (Abnormal) Collected:  05/01/20 1533    Specimen:  Blood Updated:  05/01/20 1549     WBC 6.88 10*3/mm3      RBC 3.26 10*6/mm3      Hemoglobin 9.2 g/dL      Hematocrit 26.7 %      MCV 81.9 fL      MCH 28.2 pg      MCHC 34.5 g/dL      RDW 20.7 %      RDW-SD 60.3 fl      Platelets 35 10*3/mm3     Manual Differential [[id number]]  (Abnormal) Collected:  05/01/20 1533    Specimen:  Blood Updated:  05/01/20 1709     Neutrophil % 66.0 %      Lymphocyte % 21.0 %      Monocyte % 6.0 %      Metamyelocyte % 1.0 %      Myelocyte % 2.0 %      Atypical Lymphocyte % 4.0 %      Neutrophils Absolute 4.54 10*3/mm3      Lymphocytes Absolute 1.44 10*3/mm3      Monocytes Absolute 0.41 10*3/mm3      Poikilocytes Mod/2+     Target Cells Mod/2+     WBC Morphology Normal     Platelet Morphology Normal          Imaging Results (Last 24 Hours)     ** No results found for the last 24 hours. 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       74 y.o. female admitted with Gram-negative bacteremia.    · Patient was started on Zosyn in the ED which will be continued.  Source of infection not clear.  Will consult infectious disease.  Defer additional work-up to them.  Had planned on obtaining CT scan abdomen/pelvis but she has no symptoms and a benign examination.  Repeat blood cultures obtained in ED. ?echo  · Will consult her nephrologist regarding need for continued dialysis.  · Platelet count is significantly lower than just a few days ago.  This likely chemotherapy related.  Will ask oncology to follow.  Will avoid Lovenox and use SCDs for DVT prophylaxis.  · Clarify home medications and complete database.  Per review of oncology note 4/21 she was on Coumadin for prior DVT/PE.  She has history of IVC filter.  Will check PT/INR and ask pharmacy to clarify.  · Monitor blood glucose and provide SSI  · Skin nurse to assess perineal area.      Leonardo Montague MD  [facility]  05/01/20  21:42      ADDENDUM  Nursing contacted facility who states patient taken off Coumadin in March.  Further review of records states that she had IVC filter placed end of March due to bleeding complications from anticoagulation.  Apparently has been off warfarin since that time despite the note from oncology 4/21/2020 stating otherwise.    Leonardo Montague MD  [facility]  05/01/20  11:35 PM

## 2020-05-02 NOTE — THERAPY EVALUATION
Patient Name: Lucía Wise  : 1945    MRN: [id number]                              Today's Date: 2020       Admit Date: 2020    Visit Dx:     ICD-10-CM ICD-9-CM   1. Bacteremia R78.81 790.7   2. Acute UTI N39.0 599.0   3. Thrombocytopenia (CMS/[facility]) D69.6 287.5   4. ESRD (end stage renal disease) on dialysis (CMS/[facility]) N18.6 585.6    Z99.2 V45.11     Patient Active Problem List   Diagnosis   • Gram-negative bacteremia   • Type 2 diabetes mellitus with chronic kidney disease on chronic dialysis, with long-term current use of insulin (CMS/[facility])   • Primary serous carcinoma of uterine adnexa (CMS/HCC)   • Stage IVB serous primary peritoneal carcinoma   • Hypertension   • ESRD on dialysis (CMS/HCC)   • COPD (chronic obstructive pulmonary disease) (CMS/[facility])   • Personal history of DVT/PE   • Chemotherapy-induced thrombocytopenia   • Aortic stenosis     Past Medical History:   Diagnosis Date   • Acute kidney failure (CMS/HCC)    • Acute renal failure on dialysis (CMS/[facility])    • Anemia    • Anxiety    • Cancer (CMS/HCC)     perotineal cancer   • Depression    • Diabetes (CMS/[facility])    • DVT (deep venous thrombosis) (CMS/HCC)    • ESRD (end stage renal disease) (CMS/[facility])    • Family history of peritoneal cancer    • GERD (gastroesophageal reflux disease)    • History of transfusion    • Hyperparathyroidism (CMS/HCC)    • Hypertension    • Kidney stones    • Maintenance chemotherapy     last chemo was  (2nd dose) every 3 weeks for 6 doses   • Protein-calorie malnutrition (CMS/[facility])    • Pulmonary embolism (CMS/[facility])     Acute   • Uremia    • Vitamin D deficiency    • Weakness generalized      History reviewed. No pertinent surgical history.  General Information     Row Name 20 0933          PT Evaluation Time/Intention    Document Type  evaluation;therapy note (daily note)  -PC     Mode of Treatment  physical therapy  -PC     Row Name 20 0933          General Information    Patient Profile  Reviewed?  yes  -PC     Prior Level of Function  -- unknown, pt vague historian, from SNF with eventual goal to return home with daughter, pt reports getting to a chair with assist but not able to walk, she states that sometimes they would use a lift  -PC     Existing Precautions/Restrictions  fall  -PC     Barriers to Rehab  previous functional deficit;medically complex ESRD HD dependent, currently undergoing chemo for uterine/peritoneal cancer  -PC     Row Name 05/02/20 0919          Cognitive Assessment/Intervention- PT/OT    Orientation Status (Cognition)  oriented to;person;place  -PC     Row Name 05/02/20 0918          Safety Issues, Functional Mobility    Safety Issues Affecting Function (Mobility)  ability to follow commands;insight into deficits/self awareness;judgment;safety precautions follow-through/compliance  -PC     Impairments Affecting Function (Mobility)  endurance/activity tolerance;strength;motor control;motor planning;pain  -PC       User Key  (r) = Recorded By, (t) = Taken By, (c) = Cosigned By    Initials Name Provider Type    PC Kindra Tavarez, PT Physical Therapist        Mobility     Row Name 05/02/20 7571          Bed Mobility Assessment/Treatment    Bed Mobility Assessment/Treatment  supine-sit;sit-supine  -PC     Supine-Sit [place] (Bed Mobility)  moderate assist (50% patient effort);maximum assist (25% patient effort)  -PC     Sit-Supine [place] (Bed Mobility)  maximum assist (25% patient effort)  -     Comment (Bed Mobility)  pt does not initiate activity, slow motor planning  -     Row Name 05/02/20 0961          Transfer Assessment/Treatment    Comment (Transfers)  3 standing attempts, first attempt, not able to clear buttocks, last two attempts, pt able to clear buttocks but still leaning against bed  -     Row Name 05/02/20 0962          Sit-Stand Transfer    Sit-Stand [place] (Transfers)  maximum assist (25% patient effort);moderate assist (50% patient  effort)  -     Assistive Device (Sit-Stand Transfers)  walker, front-wheeled  -PC       User Key  (r) = Recorded By, (t) = Taken By, (c) = Cosigned By    Initials Name Provider Type    PC Kindra Tavarez PT Physical Therapist        Obj/Interventions     Row Name 05/02/20 0943          General ROM    GENERAL ROM COMMENTS  WFL  -PC     Row Name 05/02/20 0943          MMT (Manual Muscle Testing)    General MMT Comments  B UE 3/5, B LE 2/5  -PC     Row Name 05/02/20 0943          Static Sitting Balance    Level of [place] (Unsupported Sitting, Static Balance)  contact guard assist;supervision  -     Sitting Position (Unsupported Sitting, Static Balance)  sitting on edge of bed  -     Time Able to Maintain Position (Unsupported Sitting, Static Balance)  more than 5 minutes  -     Row Name 05/02/20 0943          Static Standing Balance    Level of [place] (Supported Standing, Static Balance)  moderate assist, 50 to 74% patient effort;maximal assist, 25 to 49% patient effort  -PC     Assistive Device Utilized (Supported Standing, Static Balance)  walker, rolling  -PC       User Key  (r) = Recorded By, (t) = Taken By, (c) = Cosigned By    Initials Name Provider Type    PC Kindra Tavarez PT Physical Therapist        Goals/Plan     Row Name 05/02/20 0949          Bed Mobility Goal 1 (PT)    Activity/Assistive Device (Bed Mobility Goal 1, PT)  sit to supine/supine to sit  -     [place] Level/Cues Needed (Bed Mobility Goal 1, PT)  moderate assist (50-74% patient effort)  -PC     Time Frame (Bed Mobility Goal 1, PT)  2 weeks  -     Row Name 05/02/20 0949          Transfer Goal 1 (PT)    Activity/Assistive Device (Transfer Goal 1, PT)  sit-to-stand/stand-to-sit;bed-to-chair/chair-to-bed  -PC     [place] Level/Cues Needed (Transfer Goal 1, PT)  moderate assist (50-74% patient effort)  -PC     Time Frame (Transfer Goal 1, PT)  2 weeks  -     Row Name 05/02/20 0949          Gait Training Goal 1  (PT)    Activity/Assistive Device (Gait Training Goal 1, PT)  gait (walking locomotion);assistive device use  -PC     [place] Level (Gait Training Goal 1, PT)  moderate assist (50-74% patient effort);2 person assist  -PC     Distance (Gait Goal 1, PT)  5 ft  -PC     Time Frame (Gait Training Goal 1, PT)  2 weeks  -PC       User Key  (r) = Recorded By, (t) = Taken By, (c) = Cosigned By    Initials Name Provider Type    PC Kindra Tavarez PT Physical Therapist        Clinical Impression     Row Name 05/02/20 0944          Pain Assessment    Additional Documentation  Pain Scale: Numbers Pre/Post-Treatment (Group)  -PC     Row Name 05/02/20 0944          Pain Scale: Numbers Pre/Post-Treatment    Pre/Post Treatment Pain Comment  pt reports pain all over with mvmt but was uncomfortable in sitting due to sores perineal area  -PC     Pain Intervention(s)  Medication (See MAR);Repositioned  -PC     Row Name 05/02/20 0944          Plan of Care Review    Plan of Care Reviewed With  patient  -PC     Outcome Summary  pt presents with significant weakness, shayna B LEs, impaired motor planning, contributing to impaired functional mobility and activity tolerance, she will beneift from PT to address, Today pt required mod/max a to sit edge of bed, and unable to come to full stand, delayed motor planning, with dec ability to initiate movement.  Pt is at a SNF currently with complex medical issues including ESRD , HD dependent, and currently undergoing chemo for uterine/peritoneal cancer, her goal is to return home with daughter  -PC     Row Name 05/02/20 0944          Physical Therapy Clinical Impression    Criteria for Skilled Interventions Met (PT Clinical Impression)  yes;treatment indicated  -PC     Rehab Potential (PT Clinical Summary)  fair, will monitor progress closely  -PC     Row Name 05/02/20 0944          Positioning and Restraints    Pre-Treatment Position  in bed  -PC     Post Treatment Position  bed  -PC     In Bed   supine;call light within reach;encouraged to call for assist;exit alarm on  -PC       User Key  (r) = Recorded By, (t) = Taken By, (c) = Cosigned By    Initials Name Provider Type    PC Kindra Tavarez PT Physical Therapist        Outcome Measures     Row Name 05/02/20 0950          How much help from another person do you currently need...    Turning from your back to your side while in flat bed without using bedrails?  2  -PC     Moving from lying on back to sitting on the side of a flat bed without bedrails?  2  -PC     Moving to and from a bed to a chair (including a wheelchair)?  1  -PC     Standing up from a chair using your arms (e.g., wheelchair, bedside chair)?  1  -PC     Climbing 3-5 steps with a railing?  1  -PC     To walk in hospital room?  1  -PC     AM-PAC 6 Clicks Score (PT)  8  -PC     Row Name 05/02/20 0950          Functional Assessment    Outcome Measure Options  AM-PAC 6 Clicks Basic Mobility (PT)  -PC       User Key  (r) = Recorded By, (t) = Taken By, (c) = Cosigned By    Initials Name Provider Type    PC Kindra Tavarez, PT Physical Therapist          PT Recommendation and Plan  Planned Therapy Interventions (PT Eval): bed mobility training, gait training, home exercise program, strengthening, transfer training  Outcome Summary/Treatment Plan (PT)  Anticipated Discharge Disposition (PT): skilled nursing facility  Plan of Care Reviewed With: patient  Outcome Summary: pt presents with significant weakness, shayna B LEs, impaired motor planning, contributing to impaired functional mobility and activity tolerance, she will beneift from PT to address, Today pt required mod/max a to sit edge of bed, and unable to come to full stand, delayed motor planning, with dec ability to initiate movement.  Pt is at a SNF currently with complex medical issues including ESRD , HD dependent, and currently undergoing chemo for uterine/peritoneal cancer, her goal is to return home with daughter     Time  Calculation:   PT Charges     Row Name 05/02/20 0952             Time Calculation    Start Time  0912  -PC      Stop Time  0930  -PC      Time Calculation (min)  18 min  -PC      PT Received On  05/02/20  -PC      PT - Next Appointment  05/04/20  -PC      PT Goal Re-Cert Due Date  05/16/20  -PC        User Key  (r) = Recorded By, (t) = Taken By, (c) = Cosigned By    Initials Name Provider Type    PC Kindra Tavarez PT Physical Therapist        Therapy Charges for Today     Code Description Service Date Service Provider Modifiers Qty    11875897124 HC PT EVAL MOD COMPLEXITY 2 5/2/2020 Kindra Tavarez, PT GP 1    33640038988 HC PT THER PROC EA 15 MIN 5/2/2020 Kindra Tavarez, PT GP 1    76433971347 HC PT THER SUPP EA 15 MIN 5/2/2020 Kindra Tavarez, PT GP 1          PT G-Codes  Outcome Measure Options: AM-PAC 6 Clicks Basic Mobility (PT)  AM-PAC 6 Clicks Score (PT): 8  ..Patient was wearing a face mask during this therapy encounter. Therapist used appropriate personal protective equipment including mask and gloves.  Mask used was standard procedure mask. Appropriate PPE was worn during the entire therapy session. Hand hygiene was completed before and after therapy session. Patient is not in enhanced droplet precautions.       Kindra Tavarez PT  5/2/2020

## 2020-05-03 NOTE — CONSULTS
[facility] Consult Note       Patient Identification:  Name: Lucía Wise  Age: 74 y.o.  Sex: female  :  1945  MRN: [id number]    I would like to thank you for the opportunity to participate in care of this patient.    Date of Service: 5/3/2020                        Reason for Consult:         ESRD, on hemodialysis, has right IJ tunneled catheter, was found to have gram-negative bacteremia.      History of Present Illness:       Patient is a 74-year-old woman who has a history of hypertension, diabetes type 2,Primary serous carcinoma of uterine adnexa,  Stage IV primary peritoneal carcinomatosis followed by  Dr. Fitzpatrick at [facility], s/p cycle 2 of chemotherapy Taxol, carboplatinum as of 2020. COPD, aortic stenosis, has been on chemotherapy, ESRD, hemodialysis  at [facility], has right IJ tunnel catheter, patient was having spiking fever, blood cultures were drawn, patient was found to have gram-negative rods in blood cultures, subsequently sent to hospital emergency room and admitted to hospital.  Patient very weak and lethargic.  Repeat blood cultures have been drawn.  Patient has been started on IV antibiotic Zosyn, nephrology consultation has been requested for further management, she did not have dialysis on Friday.  No nausea no vomiting no diarrhea.            Review of Systems:         Constitutional: Fever, weakness  HEENT:  No headache, otalgia, itchy eyes, nasal discharge or sore throat.  Cardiac:  No chest pain, dyspnea, orthopnea or PND.  Chest:  No cough, phlegm or wheezing.  Abdomen:  No abdominal pain, nausea or vomiting.  Neuro:  No focal weakness, abnormal movements or seizure-like activity.  :   No hematuria, no pyuria, no dysuria, no flank pain.  ROS was otherwise negative except as mentioned in the  MELANIE.       Past medical history, surgical history, social history, family history, allergies and all medications reviewed and agreed.      Past History/Allergies?Social History:     Past Medical History:   Diagnosis Date   • Acute kidney failure (CMS/HCC)    • Acute renal failure on dialysis (CMS/[facility])    • Anemia    • Anxiety    • Cancer (CMS/HCC)     perotineal cancer   • Depression    • Diabetes (CMS/HCC)    • DVT (deep venous thrombosis) (CMS/[facility])    • ESRD (end stage renal disease) (CMS/[facility])    • Family history of peritoneal cancer    • GERD (gastroesophageal reflux disease)    • History of transfusion    • Hyperparathyroidism (CMS/HCC)    • Hypertension    • Kidney stones    • Maintenance chemotherapy     last chemo was april 28/2020 (2nd dose) every 3 weeks for 6 doses   • Protein-calorie malnutrition (CMS/[facility])    • Pulmonary embolism (CMS/[facility])     Acute   • Uremia    • Vitamin D deficiency    • Weakness generalized          Past Surgical History :     History reviewed. No pertinent surgical history.       Family History:     History reviewed. No pertinent family history.       Social History:     Social History     Tobacco Use   • Smoking status: Former Smoker     Types: Cigarettes   Substance Use Topics   • Alcohol use: Never     Frequency: Never     Comment: not seen from other facility records          Allergies:     Allergies   Allergen Reactions   • Ibuprofen Other (See Comments)     Kidney failure   • Aspirin Unknown - Low Severity         Home meds:     Medications Prior to Admission   Medication Sig Dispense Refill Last Dose   • acetaminophen (TYLENOL) 325 MG tablet Take 650 mg by mouth Every 6 (Six) Hours As Needed for Mild Pain , Headache or Fever.      • cloNIDine (CATAPRES) 0.1 MG tablet Take 0.2 mg by mouth As Needed for High Blood Pressure (at HD if SBP > 200 or SBP > 100, Do not give if HR < 65).      • epoetin emy (EPOGEN,PROCRIT) 3000 UNIT/ML injection Inject 6,200 Units under the skin into  the appropriate area as directed 3 (Three) Times a Week. Route is INTRAVENOUS per [facility] paperwork (would not let it be entered that way)      • Fluticasone-Salmeterol (ADVAIR HFA IN) Inhale 1 puff 2 (Two) Times a Day. 45-21 mcg      • insulin aspart (novoLOG) 100 UNIT/ML injection Inject  under the skin into the appropriate area as directed 3 (Three) Times a Day Before Meals. Sliding scale insulin, scale not listed on paperwork      • iron sucrose (VENOFER) 20 MG/ML injection Infuse 100 mg into a venous catheter 3 (Three) Times a Week.      • loperamide (IMODIUM) 2 MG capsule Take 2 mg by mouth Every 6 (Six) Hours As Needed for Diarrhea.      • nitroglycerin (NITROSTAT) 0.4 MG SL tablet Place 0.4 mg under the tongue Every 5 (Five) Minutes As Needed for Chest Pain. Take no more than 3 doses in 15 minutes.      • potassium phosphate, monobasic, (K-PHOS) 500 MG tablet Take 500 mg by mouth Daily. Only mon and Friday 1 tablet only  Before dialysis till  20 only.            Scheduled meds:     atorvastatin 10 mg Oral Daily   b complex-vitamin c-folic acid 1 tablet Oral Q PM   budesonide-formoterol 2 puff Inhalation BID - RT   insulin lispro 0-9 Units Subcutaneous 4x Daily With Meals & Nightly   metoprolol succinate XL 25 mg Oral Daily   midodrine 10 mg Oral TID AC   mirtazapine 45 mg Oral Daily   piperacillin-tazobactam 3.375 g Intravenous Q12H   potassium chloride 40 mEq Oral Daily   sodium chloride 10 mL Intravenous Q12H         Objectives:     tMax 24 hrs: Temp (24hrs), Av.1 °F (36.2 °C), Min:96.8 °F (36 °C), Max:97.4 °F (36.3 °C)      Vitals Ranges:   Temp:  [96.8 °F (36 °C)-97.4 °F (36.3 °C)] 96.8 °F (36 °C)  Heart Rate:  [67-77] 68  Resp:  [14-16] 14  BP: (116-150)/(69-82) 121/79    Intake and Output Last 3 Shifts:   I/O last 3 completed shifts:  In: 500 [P.O.:400; IV Piggyback:100]  Out: -       Exam:     /79 (BP Location: Left arm, Patient Position: Lying)   Pulse 68   Temp 96.8 °F (36 °C) (Oral)    "Resp 14   Ht 165.1 cm (65\")   Wt 88 kg (194 lb 0.1 oz)   SpO2 100%   BMI 32.28 kg/m²     General Appearance:   Appears chronically ill looking.   Head:    Normocephalic, atraumatic   Eyes:     EOM's intact, sclerae anicteric        Ears:    TMs not observed   Nose:   Patent without discharge   Neck:  Right IJ tunneled catheter.   Lungs:     Clear to auscultation bilaterally, respiratory effort is normal   Chest wall:    No tenderness   Heart:    Regular rate and rhythm, S1 and S2 normal, no   rub    or gallop   Abdomen:     Soft, nontender, nondistended,  no masses, no organomegaly   Extremities:  trace edema   Neurologic:  No focal deficits.  Speech is fluent.  Conversation is coherent.       Data Review:       BMP:   Results from last 7 days   Lab Units 05/03/20  0447   GLUCOSE mg/dL 133*   CO2 mmol/L 25.6   BUN mg/dL 37*   CREATININE mg/dL 4.61*   CALCIUM mg/dL 8.0*     Coagulation:   INR   Date Value Ref Range Status   05/01/2020 1.38 (H) 0.90 - 1.10 Final     Cardiac markers:     ABGs:       Invalid input(s): PO2           Imaging:      [unfilled]      Assessment:      ESRD, has right IJ tunnel catheter, goes to [facility], hemodialysis Monday Wednesday Friday.    Volume, mildly increased.    Gram-negative bacteremia, blood cultures from outpatient 2 x 2 positive for gram-negative bacteremia.    Peritoneal carcinomatosis.    Stage IV primary peritoneal carcinomatosis followed by Dr. Fitzpatrick at [facility], s/p cycle 2 of chemotherapy Taxol, carboplatinum as of 4/29/2020.      Gram-negative bacteremia    Type 2 diabetes mellitus with chronic kidney disease on chronic dialysis, with long-term current use of insulin (CMS/HCC)    Primary serous carcinoma of uterine adnexa (CMS/HCC)    Stage IVB serous primary peritoneal carcinoma    Hypertension    ESRD on dialysis (CMS/HCC)    COPD (chronic obstructive pulmonary disease) (CMS/[facility])    Personal history of DVT/PE    Chemotherapy-induced " thrombocytopenia    Aortic stenosis    ·       Plan:     · At this point will order hemodialysis today since patient did not have hemodialysis on Friday.  · Blood pressure is on the high side, will tolerate ultrafiltration as tolerated.  · Potassium is low will perform hemodialysis with 4K bath.  · Continue antibiotics.  · Patient has a right IJ tunnel catheter, may need removal of the internal catheter and line holiday as patient continues to be septic or becomes unstable.  · Will coordinate with primary Dr. Montague.  ·  Stage IV primary peritoneal carcinomatosis followed by Dr. Fitzpatrick at [facility], s/p cycle 2 of chemotherapy Taxol, carboplatinum as of 4/29/2020.  · Patient has severe thrombocytopenia which is chronic.  Suspected chemotherapy-induced  · Hemoglobin is on the low side but acceptable with ESRD, malignancy, no need of transfusion..  · Overall outlook is poor.  · We will continue to monitor this patient closely.  · Thanks for consultation.            Lion Orellana MD  5/3/2020

## 2020-05-03 NOTE — PROGRESS NOTES
Name: Lucía Wise ADMIT: 2020   : 1945  PCP: Lorelei Campos DO    MRN: [id number] LOS: 2 days   AGE/SEX: 74 y.o. female  ROOM: [facility]     Subjective   Subjective   C/O dry cough that started this morning. Slept fairly well. No pain.   Review of Systems   Constitutional: Negative for chills, diaphoresis and fever.   HENT: Negative for congestion, postnasal drip and sore throat.    Eyes: Negative for discharge.   Respiratory: Negative for chest tightness and shortness of breath.    Cardiovascular: Negative for chest pain and palpitations.   Gastrointestinal: Negative for abdominal pain, constipation and diarrhea.   Neurological: Negative for dizziness and headaches.   Psychiatric/Behavioral: Dysphoric mood:           Objective   Objective   Vital Signs  Temp:  [96.8 °F (36 °C)-97.4 °F (36.3 °C)] 96.8 °F (36 °C)  Heart Rate:  [67-77] 68  Resp:  [14-16] 14  BP: (116-150)/(69-82) 121/79  SpO2:  [96 %-100 %] 100 %  on   ;   Device (Oxygen Therapy): room air  Body mass index is 32.28 kg/m².  Physical Exam   Constitutional: She is oriented to person, place, and time. She appears well-nourished. No distress.   HENT:   Head: Normocephalic and atraumatic.   Eyes: EOM are normal. Right eye exhibits no discharge. Left eye exhibits no discharge.   Neck: Normal range of motion. Neck supple. No JVD present.   Cardiovascular: Normal rate, regular rhythm, normal heart sounds and intact distal pulses.   No murmur heard.  Right upper chest HD line. Left upper chest mediport.    Pulmonary/Chest: Effort normal and breath sounds normal. No respiratory distress.   Harsh, frequent, dry cough noted during my exam.   Abdominal: Soft. Bowel sounds are normal. She exhibits no distension. There is no tenderness.   Last BM    Musculoskeletal: Normal range of motion.   General weakness.   Neurological: She is alert and oriented to person, place, and time.   Skin: Skin is warm and dry.   Psychiatric: She has a normal mood  and affect.       Results Review:       I reviewed the patient's new clinical results.  Results from last 7 days   Lab Units 05/03/20  0448 05/02/20  1134 05/02/20  0444 05/01/20  1533   WBC 10*3/mm3 11.51*  --  8.12 6.88   HEMOGLOBIN g/dL 9.3*  --  8.5* 9.2*   PLATELETS 10*3/mm3 39*  39* 36* 30* 35*     Results from last 7 days   Lab Units 05/03/20  0447 05/02/20  0444 05/01/20  1533   SODIUM mmol/L 144 140 142   POTASSIUM mmol/L 3.1* 3.0* 3.3*   CHLORIDE mmol/L 100 96* 97*   CO2 mmol/L 25.6 25.5 27.9   BUN mg/dL 37* 31* 25*   CREATININE mg/dL 4.61* 3.96* 3.37*   GLUCOSE mg/dL 133* 136* 122*   Estimated Creatinine Clearance: 11.7 mL/min (A) (by C-G formula based on SCr of 4.61 mg/dL (H)).  Results from last 7 days   Lab Units 05/03/20  0447 05/01/20  1533   ALBUMIN g/dL 1.90* 2.00*   BILIRUBIN mg/dL 0.6 0.8   ALK PHOS U/L 121* 98   AST (SGOT) U/L 49* 28   ALT (SGPT) U/L 24 18     Results from last 7 days   Lab Units 05/03/20  0447 05/02/20  0444 05/01/20  1533   CALCIUM mg/dL 8.0* 8.0* 8.3*   ALBUMIN g/dL 1.90*  --  2.00*       Hemoglobin A1C   Date/Time Value Ref Range Status   05/02/2020 0444 5.62 (H) 4.80 - 5.60 % Final     Glucose   Date/Time Value Ref Range Status   05/03/2020 0650 117 70 - 130 mg/dL Final   05/02/2020 2120 100 70 - 130 mg/dL Final   05/02/2020 1626 87 70 - 130 mg/dL Final   05/02/2020 1114 154 (H) 70 - 130 mg/dL Final   05/02/2020 0612 121 70 - 130 mg/dL Final   05/01/2020 2243 102 70 - 130 mg/dL Final         atorvastatin 10 mg Oral Daily   b complex-vitamin c-folic acid 1 tablet Oral Q PM   budesonide-formoterol 2 puff Inhalation BID - RT   insulin lispro 0-9 Units Subcutaneous 4x Daily With Meals & Nightly   metoprolol succinate XL 25 mg Oral Daily   midodrine 10 mg Oral TID AC   mirtazapine 45 mg Oral Daily   piperacillin-tazobactam 3.375 g Intravenous Q12H   sodium chloride 10 mL Intravenous Q12H      Diet Regular; Renal, Consistent Carbohydrate       Assessment/Plan     Active Hospital  Problems    Diagnosis  POA   • **Gram-negative bacteremia [R78.81]  Yes   • Type 2 diabetes mellitus with chronic kidney disease on chronic dialysis, with long-term current use of insulin (CMS/[facility]) [E11.22, N18.6, Z99.2, Z79.4]  Not Applicable   • Primary serous carcinoma of uterine adnexa (CMS/[facility]) [C57.4]  Yes   • 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       74 y.o. female admitted with Gram-negative bacteremia.      Gram-negative bacteremia  New onset cough reported as of this morning, we will check chest x-ray. Afebrile through the night.    Blood cultures pending.  Continue IV Zosyn.  Appreciate infectious disease following this patient.  Encourage pulmonary toilet with IS.  Get out of bed to chair 2 times a day, while vital signs stable.  Check labs in a.m.    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       Diabetes  A1c 5.6 continued.  Blood sugars have been stable, continue sliding scale humalog.     ESRD  Nephrology following her for dialysis orders.  Will defer hypokalemia to nephrology for replacement    COPD  Currently wearing 2 L of oxygen.  No current signs of exacerbation.  Will check chest x-ray    History of DVT  Nursing has verified with long-term care with patient was staying that the patient has been off of her Coumadin since March due to bleeding complications.  She has had an IVC filter placed.  No need  to restart Coumadin at this time.  · SCDs for DVT prophylaxis.  · Full code.  · Discussed with patient and Dr. Berry.  · Anticipate discharge TBD.        VELMA Gleason  [facility]  05/03/20  08:07

## 2020-05-03 NOTE — PROGRESS NOTES
INFECTIOUS DISEASES PROGRESS NOTE    CC: f/u bacteremia    S:   She has a dry cough.  No fevers chills or night sweats    O:  Physical Exam:  Temp:  [96.8 °F (36 °C)-97.4 °F (36.3 °C)] 96.8 °F (36 °C)  Heart Rate:  [67-77] 68  Resp:  [14-16] 14  BP: (116-150)/(69-82) 121/79  Physical Exam   Constitutional: She appears well-developed. No distress.   Pulmonary/Chest: Effort normal.   Abdominal: Soft. She exhibits no distension. There is no tenderness.   Neurological: She is alert.   Skin: Skin is warm and dry.   Psychiatric: She has a normal mood and affect. Her behavior is normal.        Diagnostics:    Cr 4.61    WBC 11.5  H/H 9.3/27  PLT 39    Microbiology:  5/1 BCx NGTD  4/29/2020 dialysis blood cultures 2/2 gram-negative rods    Assessment/Plan   1.  Gram-negative bacteremia  2.  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  End-stage renal disease    Cont zosyn 3.375g IV q12 for now.  Chest x-ray not done yesterday nor second blood culture.  Obtain chest x-ray now.  Favors transient bacteremia from neutropenia versus line infection.  Cutaneous source less favored      Eamon Vyas MD  8:58 AM  05/03/20

## 2020-05-03 NOTE — PROGRESS NOTES
REASON FOR CONSULTATION:   1.  Stage IV primary peritoneal carcinomatosis followed by Dr. Dr. Fitzpatrick at [facility], s/p cycle 2 of chemotherapy Taxol, carboplatinum as of 4/28/2020.     2.  Sepsis with gram-negative bacteremia admitted for IV antibiotics, on Zosyn     3.  Chronic skin changes on the lower abdomen and buttocks, wound care following       INTERVAL HISTORY:  On 5/3/2020, patient is afebrile, BP stable.  She reports slightly feeling better.  No nausea no vomiting.  No pains.                                 HISTORY OF PRESENT ILLNESS:  The patient is a 74 y.o. year old female who is here for an opinion about the above issue.     History of Present Illness patient is a 74-year-old female with history of stage IVb serous primary peritoneal carcinoma.  She has been followed by Dr. Lorelei Lizama at [facility].   Patient is receiving chemotherapy and was last seen by Dr. Joel on April 21, 2020 for her stage IVb serous primary peritoneal carcinomatosis at which time she received cycle 2 chemotherapy.  She has a history of DVT with IVC filter in place as well as end-stage renal disease on hemodialysis.  She developed fever during dialysis on April 29, 2020 and blood cultures were drawn.  She now has gram-negative rods and she was told to go to the ER.  Patient has been placed on Zosyn.  Infectious disease Dr. Muñoz is seeing the patient.     She was admitted for gram-negative bacteria.  Blood cultures on May 1 showed 2 out of 2 gram-negative rods.  Infectious disease is concerned that she could have had neutropenic sepsis from chemotherapy or line infection.  Chest x-ray has been ordered.  In order to make sure there is no septic pulmonary emboli.     She gets her dialysis Monday Wednesday Friday.  She lives in a nursing home.  She denies nausea vomiting on admission.  She is still making some urine.  Did not denies any dysuria or frequency of urination.  She denied any redness around  the port site.  Nephrology has been consulted.  She has been on Coumadin in the past for DVT pulmonary embolism.     Her CBC shows a hemoglobin of 8.5 white count of 8.12 platelet count of 30 K.,  Haptoglobin is 276, ferritin 1000 108, , INR of 1.38 BUN of 25 and creatinine of 3.37, lipase of 19, ESR of 53, C-reactive protein of 23.     Patient has no active bleeding on the skin site or anywhere.  No GI bleeding.  Nephrology is to see patient.       Oncology History:   Stage IVB serous primary peritoneal carcinoma   General Cancer History   Antonieta Wise is a 74 yr/o female referral by Dr. 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    2/11/20 CT C/A/P: Left lower lobe atelectasis or infiltrate and trace of left pleural fluid. Trace of pericardial fluid. Not mentioned above are some small nodular densities seen in the epicardial fat could represent reactive or metastatic lymph nodes. Moderate ascites. Nodular thickening involving the peritoneal cavity concerning for peritoneal carcinomatosis. Diverticulosis, possible cholelithiasis versus sludge, atherosclerotic changes, bilateral renal cysts and nonobstructing left renal calculi and other incidental findings as described above. On further review, there is also some mild retroperitoneal and bilateral external iliac adenopathy which could represent metastatic disease as well.  2/11/20 Paracentesis: 1320 mL of clear, straw-colored ascites was obtained.   PATHOLOGY: High-grade serous carcinoma  2/11/20 Omental Biopsy: High grade serous carcinoma, most compatible with mullerian origin. CK7, PAX8, p16, p53, WT1 and CA-125 positive, CK20 negative.  2/12/20 CT C: Mediastinal and bilateral supraclavicular adenopathy, likely metastatic given the context. Small left pleural effusion. Chronic thyroid masses, incompletely characterized but less likely to be responsible for the patient's other malignancy given their stability.  2/20/20 VQ  scan: High probability for pulmonary embolism. Bronchospasm.  2/2020 US LE: Right lower extremity with evidence of acute occlusive deep vein thrombus in the popliteal vein. No evidence for superficial vein thrombus.   Left lower extremity with evidence of acute occlusive deep vein thrombus in the common femoral vein at the saphenofemoral junction. Evidence for superficial vein thrombus in the great saphenous vein.  3/31/20- current: Taxol 175mg/g1=910ia, Carboplatin FIB9=978yn, every 21 days x1c           Medical History        Past Medical History:   Diagnosis Date   • Acute kidney failure (CMS/HCC)     • Acute renal failure on dialysis (CMS/HCC)     • Anemia     • Anxiety     • Cancer (CMS/HCC)       perotineal cancer   • Depression     • Diabetes (CMS/HCC)     • DVT (deep venous thrombosis) (CMS/HCC)     • ESRD (end stage renal disease) (CMS/HCC)     • Family history of peritoneal cancer     • GERD (gastroesophageal reflux disease)     • History of transfusion     • Hyperparathyroidism (CMS/HCC)     • Hypertension     • Kidney stones     • Maintenance chemotherapy       last chemo was april 28/2020 (2nd dose) every 3 weeks for 6 doses   • Protein-calorie malnutrition (CMS/HCC)     • Pulmonary embolism (CMS/HCC)       Acute   • Uremia     • Vitamin D deficiency     • Weakness generalized              Surgical History   History reviewed. No pertinent surgical history.       MEDICATIONS: See EMR.     ALLERGIES:          Allergies   Allergen Reactions   • Ibuprofen Other (See Comments)       Kidney failure   • Aspirin Unknown - Low Severity        Review of Systems   Constitutional: Positive for fatigue and fever (this has resolved after hospitalization with IV antibiotics). Negative for appetite change, chills, diaphoresis and unexpected weight change.   HENT: Negative for hearing loss, sore throat and trouble swallowing.    Respiratory: Negative for cough, chest tightness, shortness of breath and wheezing.       Cardiovascular: Negative for chest pain, palpitations and leg swelling.   Gastrointestinal: Negative for abdominal distention, abdominal pain, constipation, diarrhea, nausea and vomiting.   Genitourinary: Negative for dysuria, frequency, hematuria and urgency.   Musculoskeletal: Negative for joint swelling.        No muscle weakness.   Skin: Negative for rash and wound.   Neurological: Negative for seizures, syncope, speech difficulty, weakness, numbness and headaches.   Hematological: Negative for adenopathy. Does not bruise/bleed easily.   Psychiatric/Behavioral: Negative for behavioral problems, confusion and suicidal ideas.   Patient complained of chills on admission which is resolved.  She has skin lesions which are chronic on the lower abdomen        Objective      Vitals:    05/02/20 2143 05/02/20 2320 05/03/20 0500 05/03/20 0955   BP: 136/82  121/79 119/73   BP Location: Left arm  Left arm Left arm   Patient Position: Lying  Lying Lying   Pulse: 74 70 68 71   Resp: 16 16 14 16   Temp: 97 °F (36.1 °C)  96.8 °F (36 °C) 97.1 °F (36.2 °C)   TempSrc: Oral  Oral Oral   SpO2: 96% 99% 100% 98%   Weight:   88 kg (194 lb 0.1 oz)    Height:          Physical Exam    RESPIRATORY:  Normal respiratory effort.  Lungs clear to auscultation bilaterally.  CARDIOVASCULAR:  Normal S1, S2.  No murmurs rubs or gallops.  No significant lower extremity edema.  GASTROINTESTINAL: Abdomen appears unremarkable.  Soft, no tender.  No hepatomegaly.  No splenomegaly.  Bowel sounds normal.  LYMPHATIC:  No cervical, supraclavicular, axillary lymphadenopathy.  SKIN:  Warm.  No rashes.  Patient has skin changes on the abdomen which appear to be chronic and also the buttock.   PSYCHIATRIC:  Normal judgment and insight.  Normal mood and affect.       RECENT LABS:    Results from last 7 days   Lab Units 05/03/20  0448 05/02/20  1134 05/02/20  0444 05/01/20  1533   WBC 10*3/mm3 11.51*  --  8.12 6.88   HEMOGLOBIN g/dL 9.3*  --  8.5* 9.2*    HEMATOCRIT % 26.8*  --  25.1* 26.7*   PLATELETS 10*3/mm3 39*  39* 36* 30* 35*   MONOCYTES % % 3.0*  --   --  6.0     Lab Results   Component Value Date    NEUTROABS 8.86 (H) 05/03/2020     Results from last 7 days   Lab Units 05/03/20  0447 05/02/20  0444 05/01/20  1533   SODIUM mmol/L 144 140 142   POTASSIUM mmol/L 3.1* 3.0* 3.3*   CHLORIDE mmol/L 100 96* 97*   CO2 mmol/L 25.6 25.5 27.9   BUN mg/dL 37* 31* 25*   CREATININE mg/dL 4.61* 3.96* 3.37*   CALCIUM mg/dL 8.0* 8.0* 8.3*   BILIRUBIN mg/dL 0.6  --  0.8   ALK PHOS U/L 121*  --  98   ALT (SGPT) U/L 24  --  18   AST (SGOT) U/L 49*  --  28   GLUCOSE mg/dL 133* 136* 122*     Lab Results   Component Value Date    MRIHORKQ97 >2,000 (H) 05/02/2020     Lab Results   Component Value Date    FOLATE 15.00 05/02/2020     Lab Results   Component Value Date    IRON 35 (L) 05/02/2020    TIBC 127 (L) 05/02/2020    FERRITIN 1,108.00 (H) 05/02/2020     Component      Latest Ref Rng & Units 5/2/2020   IPF      0.90 - 6.50 % 20.50 (H)   Platelets      140 - 450 10*3/mm3 36 (LL)   Haptoglobin      30 - 200 mg/dL 276 (H)   LDH      135 - 214 U/L 227 (H)           Assessment/Plan         1.  Stage IV primary peritoneal carcinomatosis followed by Dr. Dr. Fitzpatrick at [facility], s/p cycle 2 of chemotherapy Taxol, carboplatinum as of 4/29/2020.     2.  Sepsis with gram-negative bacteremia admitted for IV antibiotics, on Zosyn.  Infectious disease following     3.    Severe thrombocytopenia.    · This is secondary to chemotherapy and likely worsened by her sepsis, no active bleeding.    She had a normal platelets before cycle #1 chemotherapy.  · Peripheral smear reviewed on 5/2/2020, there is no clumping of platelets.  · Stable platelets 39,000 on 5/3/2020.  Has elevated IPF.  However we do not think patient has ITP.  Continue to monitor platelets.     4.  Anemia.  Multifactorial.    · This is due to chronic renal failure, on dialysis, and receive SHAINA during dialysis.   Baseline hemoglobin above 10, peak at 11.8 on 2/19/2020.   · Hemoglobin 8.5 on 5/2/2020.  Laboratory studies showed supratherapeutic B12 level and a good folate level.  Iron panel fits with informatory response.   · Pending results for serum protein immunofixation.  If negative no further study.  ·       Plan  1. Await blood culture results.  2. Continue antibiotics, infectious disease following.    3. Monitor CBC daily.  4. At discharge patient will need to follow-up with her GYN oncologist at [facility]  5. Will follow in the hospital.  6. Please consult GYN oncology as sometimes they will come here to see patients.       Discussed that with the patient at the bedside.  I also spoke with her nurse today.      RONALD LYLE M.D., Ph.D.    5/3/2020

## 2020-05-03 NOTE — PLAN OF CARE
Problem: Patient Care Overview  Goal: Plan of Care Review  Outcome: Ongoing (interventions implemented as appropriate)  Flowsheets (Taken 5/3/2020 5324)  Progress: no change  Plan of Care Reviewed With: patient  Outcome Summary: Dialysis today. VSS. Turn 2Q. BG wnl

## 2020-05-03 NOTE — PLAN OF CARE
Problem: Patient Care Overview  Goal: Plan of Care Review  Outcome: Ongoing (interventions implemented as appropriate)  Flowsheets (Taken 5/3/2020 9031)  Progress: no change  Plan of Care Reviewed With: patient  Note:   Vss.  Blood sugar WNL.  Denies pain.  Loose BM's.  Dr. Lizama to see today.    Multiple skin tears to abdomen, thighs and buttocks.  Barrier cream applied  Awaiting for wound rn to see.

## 2020-05-04 NOTE — CONSULTS
[facility] Consult Note       Patient Identification:  Name: Lucía Wise  Age: 74 y.o.  Sex: female  :  1945  MRN: [id number]    I would like to thank you for the opportunity to participate in care of this patient.    Date of Service: 2020                        Reason for Consult:         ESRD, on hemodialysis, has right IJ tunneled catheter, was found to have gram-negative bacteremia.      History of Present Illness:       Patient is a 74-year-old woman who has a history of hypertension, diabetes type 2,Primary serous carcinoma of uterine adnexa,  Stage IV primary peritoneal carcinomatosis followed by  Dr. Fitzpatrick at [facility], s/p cycle 2 of chemotherapy Taxol, carboplatinum as of 2020. COPD, aortic stenosis, has been on chemotherapy, ESRD, hemodialysis  at [facility], has right IJ tunnel catheter, patient was having spiking fever, blood cultures were drawn, patient was found to have gram-negative rods in blood cultures, subsequently sent to hospital emergency room and admitted to hospital.  Patient very weak and lethargic.  Repeat blood cultures have been drawn.  Patient has been started on IV antibiotic Zosyn, nephrology consultation has been requested for further management, she did not have dialysis on Friday.  No nausea no vomiting no diarrhea.            Review of Systems:         Constitutional: Fever, weakness  HEENT:  No headache, otalgia, itchy eyes, nasal discharge or sore throat.  Cardiac:  No chest pain, dyspnea, orthopnea or PND.  Chest:  No cough, phlegm or wheezing.  Abdomen:  No abdominal pain, nausea or vomiting.  Neuro:  No focal weakness, abnormal movements or seizure-like activity.  :   No hematuria, no pyuria, no dysuria, no flank pain.  ROS was otherwise negative except as mentioned in the  MELANEI.       Past medical history, surgical history, social history, family history, allergies and all medications reviewed and agreed.      Past History/Allergies?Social History:     Past Medical History:   Diagnosis Date   • Acute kidney failure (CMS/HCC)    • Acute renal failure on dialysis (CMS/formerly [facility])    • Anemia    • Anxiety    • Cancer (CMS/HCC)     perotineal cancer   • Depression    • Diabetes (CMS/HCC)    • DVT (deep venous thrombosis) (CMS/formerly [facility])    • ESRD (end stage renal disease) (CMS/formerly [facility])    • Family history of peritoneal cancer    • GERD (gastroesophageal reflux disease)    • History of transfusion    • Hyperparathyroidism (CMS/HCC)    • Hypertension    • Kidney stones    • Maintenance chemotherapy     last chemo was april 28/2020 (2nd dose) every 3 weeks for 6 doses   • Protein-calorie malnutrition (CMS/formerly [facility])    • Pulmonary embolism (CMS/formerly [facility])     Acute   • Uremia    • Vitamin D deficiency    • Weakness generalized          Past Surgical History :     History reviewed. No pertinent surgical history.       Family History:     History reviewed. No pertinent family history.       Social History:     Social History     Tobacco Use   • Smoking status: Former Smoker     Types: Cigarettes   Substance Use Topics   • Alcohol use: Never     Frequency: Never     Comment: not seen from other facility records          Allergies:     Allergies   Allergen Reactions   • Ibuprofen Other (See Comments)     Kidney failure   • Aspirin Unknown - Low Severity         Home meds:     Medications Prior to Admission   Medication Sig Dispense Refill Last Dose   • acetaminophen (TYLENOL) 325 MG tablet Take 650 mg by mouth Every 6 (Six) Hours As Needed for Mild Pain , Headache or Fever.      • cloNIDine (CATAPRES) 0.1 MG tablet Take 0.2 mg by mouth As Needed for High Blood Pressure (at HD if SBP > 200 or SBP > 100, Do not give if HR < 65).      • epoetin emy (EPOGEN,PROCRIT) 3000 UNIT/ML injection Inject 6,200 Units under the skin into  "the appropriate area as directed 3 (Three) Times a Week. Route is INTRAVENOUS per [facility] paperwork (would not let it be entered that way)      • Fluticasone-Salmeterol (ADVAIR HFA IN) Inhale 1 puff 2 (Two) Times a Day. 45-21 mcg      • insulin aspart (novoLOG) 100 UNIT/ML injection Inject  under the skin into the appropriate area as directed 3 (Three) Times a Day Before Meals. Sliding scale insulin, scale not listed on paperwork      • iron sucrose (VENOFER) 20 MG/ML injection Infuse 100 mg into a venous catheter 3 (Three) Times a Week.      • loperamide (IMODIUM) 2 MG capsule Take 2 mg by mouth Every 6 (Six) Hours As Needed for Diarrhea.      • nitroglycerin (NITROSTAT) 0.4 MG SL tablet Place 0.4 mg under the tongue Every 5 (Five) Minutes As Needed for Chest Pain. Take no more than 3 doses in 15 minutes.      • potassium phosphate, monobasic, (K-PHOS) 500 MG tablet Take 500 mg by mouth Daily. Only mon and Friday 1 tablet only  Before dialysis till  20 only.            Scheduled meds:       atorvastatin 10 mg Oral Daily   b complex-vitamin c-folic acid 1 tablet Oral Q PM   budesonide-formoterol 2 puff Inhalation BID - RT   insulin lispro 0-9 Units Subcutaneous 4x Daily With Meals & Nightly   metoprolol succinate XL 25 mg Oral Daily   midodrine 10 mg Oral TID AC   mirtazapine 45 mg Oral Daily   piperacillin-tazobactam 3.375 g Intravenous Q12H   sodium chloride 10 mL Intravenous Q12H         Objectives:     tMax 24 hrs: Temp (24hrs), Av.5 °F (36.4 °C), Min:96.7 °F (35.9 °C), Max:98.1 °F (36.7 °C)      Vitals Ranges:   Temp:  [96.7 °F (35.9 °C)-98.1 °F (36.7 °C)] 96.7 °F (35.9 °C)  Heart Rate:  [63-72] 63  Resp:  [16] 16  BP: (119-152)/(73-81) 131/75    Intake and Output Last 3 Shifts:   I/O last 3 completed shifts:  In: 480 [P.O.:280; IV Piggyback:200]  Out: -       Exam:     /75 (BP Location: Left arm, Patient Position: Lying)   Pulse 63   Temp 96.7 °F (35.9 °C) (Oral)   Resp 16   Ht 165.1 cm (65\")   " Wt 90.7 kg (199 lb 15.3 oz)   SpO2 96%   BMI 33.27 kg/m²     General Appearance:   Appears chronically ill looking.   Head:    Normocephalic, atraumatic   Eyes:     EOM's intact, sclerae anicteric        Ears:    TMs not observed   Nose:   Patent without discharge   Neck:  Right IJ tunneled catheter.   Lungs:     Clear to auscultation bilaterally, respiratory effort is normal   Chest wall:    No tenderness   Heart:    Regular rate and rhythm, S1 and S2 normal, no   rub    or gallop   Abdomen:     Soft, nontender, nondistended,  no masses, no organomegaly   Extremities:  trace edema   Neurologic:  No focal deficits.  Speech is fluent.  Conversation is coherent.       Data Review:       BMP:   Results from last 7 days   Lab Units 05/03/20  0447   GLUCOSE mg/dL 133*   CO2 mmol/L 25.6   BUN mg/dL 37*   CREATININE mg/dL 4.61*   CALCIUM mg/dL 8.0*     Coagulation:   INR   Date Value Ref Range Status   05/01/2020 1.38 (H) 0.90 - 1.10 Final     Cardiac markers:     ABGs:       Invalid input(s): PO2           Imaging:      [unfilled]      Assessment:      ESRD, has right IJ tunnel catheter, goes to [facility], hemodialysis Monday Wednesday Friday.    Volume, mildly increased.    Gram-negative bacteremia, blood cultures from outpatient 2 x 2 positive for gram-negative bacteremia.    Peritoneal carcinomatosis.    Stage IV primary peritoneal carcinomatosis followed by Dr. Fitzpatrick at [facility], s/p cycle 2 of chemotherapy Taxol, carboplatinum as of 4/29/2020.      Gram-negative bacteremia    Type 2 diabetes mellitus with chronic kidney disease on chronic dialysis, with long-term current use of insulin (CMS/[facility])    Primary serous carcinoma of uterine adnexa (CMS/HCC)    Stage IVB serous primary peritoneal carcinoma    Hypertension    ESRD on dialysis (CMS/[facility])    COPD (chronic obstructive pulmonary disease) (CMS/[facility])    Personal history of DVT/PE    Chemotherapy-induced thrombocytopenia    Aortic  stenosis    ·       Plan:     ·  id following with possible neutropenic sepsis and line infection less likely.  · Continue antibiotics.  · Patient has a right IJ tunnel catheter.  · Will dialyse again today.  ·  Stage IV primary peritoneal carcinomatosis followed by Dr. Fitzpatrick at [facility], s/p cycle 2 of chemotherapy Taxol, carboplatinum as of 4/29/2020.  · Patient has severe thrombocytopenia which is chronic.  Suspected chemotherapy-induced  · Hemoglobin is on the low side but acceptable with ESRD, malignancy, no need of transfusion..  · Overall outlook is poor.  · We will continue to monitor this patient closely.  · Thanks for consultation.            Zain Luu MD  5/4/2020

## 2020-05-04 NOTE — NURSING NOTE
CWOCN consult- patient has scattered wounds across buttocks and groin. Partial thickness. Patient says it started around a month ago. She says it is painful at times. See photos. Wounds are pink and yellow, mostly dry.  Recommend to try magic barrier cream. If skin does not improve, she may need to follow up with a dermatologist.

## 2020-05-04 NOTE — PLAN OF CARE
Problem: Patient Care Overview  Goal: Plan of Care Review  Outcome: Ongoing (interventions implemented as appropriate)  Flowsheets (Taken 5/4/2020 1505)  Progress: improving  Plan of Care Reviewed With: patient  Outcome Summary: WBC 14.15, IV antibiotic ordered. Patient is in dialysis this afternoon. ID saw patient this AM, contacted facility and had them send blood culture results to MD. Accu-checks slightly elevated, covered with sliding scale insulin. No c/o pain. VSS.

## 2020-05-04 NOTE — PLAN OF CARE
Problem: Patient Care Overview  Goal: Plan of Care Review  Outcome: Ongoing (interventions implemented as appropriate)  Flowsheets (Taken 5/4/2020 6067)  Progress: improving  Plan of Care Reviewed With: patient  Outcome Summary: VSS, had frequent loose stools during the night, c/o pain periarea from sores with every incontinence care, changed position frequently, moisture control maintained, skin care done, same IV antibiotics continued, falls precaution maintained, for further care

## 2020-05-04 NOTE — DISCHARGE PLACEMENT REQUEST
"Tash Zelaya (74 y.o. Female)     Date of Birth Social Security Number Address Home Phone MRN    1945  [address] [address] [phone] [id number]    Protestant Marital Status          Church Single       Admission Date Admission Type Admitting Provider Attending Provider Department, Room/Bed    5/1/20 Emergency Leonardo Montague MD Richards, Stephen J, MD [address], 87/1    Discharge Date Discharge Disposition Discharge Destination                       Attending Provider:  Elliot Berry MD    Allergies:  Ibuprofen, Aspirin    Isolation:  None   Infection:  None   Code Status:  CPR    Ht:  165.1 cm (65\")   Wt:  90.7 kg (199 lb 15.3 oz)    Admission Cmt:  None   Principal Problem:  Gram-negative bacteremia [R78.81]                 Active Insurance as of 5/1/2020     Primary Coverage     Payor Plan Insurance Group Employer/Plan Group    MEDICARE MEDICARE A & B      Payor Plan Address Payor Plan Phone Number Payor Plan Fax Number Effective Dates    [address] [phone]  5/1/2002 - None Entered    [place] [address]       Subscriber Name Subscriber Birth Date Member ID       TASH ZELAYA 1945 [id number]                 Emergency Contacts      (Rel.) Home Phone Work Phone Mobile Phone    GENARO AVALOS (Daughter) -- -- [phone]              "

## 2020-05-04 NOTE — PROGRESS NOTES
INFECTIOUS DISEASES PROGRESS NOTE    CC: f/u GNR    S:   Feels okay  No pain  No f/c/ns  No n/v but poor appetite    O:  Physical Exam:  Temp:  [96.7 °F (35.9 °C)-98.1 °F (36.7 °C)] 96.7 °F (35.9 °C)  Heart Rate:  [63-72] 63  Resp:  [16] 16  BP: (119-152)/(73-81) 131/75  Physical Exam   Constitutional: She appears well-developed. No distress.   Pulmonary/Chest: Effort normal.   Abdominal: Soft. She exhibits no distension. There is no tenderness.   Neurological: She is alert.   Skin: Skin is warm and dry.   Psychiatric: She has a normal mood and affect. Her behavior is normal.        Diagnostics:    glc     Microbiology:  5/1 BCx NGTD  4/29/2020 dialysis blood cultures 2/2 gram-negative rods     Estimated Creatinine Clearance: 11.9 mL/min (A) (by C-G formula based on SCr of 4.61 mg/dL (H)).    Assessment/Plan   1.  Gram-negative bacteremia  2.  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  End-stage renal disease    Stable.  D/w nursing and will try to obtain update from dialysis center on pending cx.  Once final sensitivities and ID known, can finalize abx plan. Cont zosyn in interim. Plan HD again today per nephrology.      Addendum: Final blood cultures obtained from [facility].  On 4/29/2020 she grew ESBL E. coli sensitive to gentamicin, imipenem, tobramycin, trimethoprim sulfamethoxazole and Zosyn.  She seems to be responding to the Zosyn and has negative blood cultures from 5/1.  I suspect she just had a transient bacteremia and less likely deep-seated line infection or deep nidus of infection.  Any event, I will switch her to ertapenem 500 mg IV every 24 hours through 5/8/2020.  Would give after HD on HD days. She can receive this through her port.  No objection to discharge when okay with others    Eamon Vyas MD  9:46 AM  05/04/20

## 2020-05-04 NOTE — PROGRESS NOTES
LOS: 3 days     Name: Lucía Wise  Age/Sex: 74 y.o. female  :  1945        PCP: Lorelei Campos DO  Chief Complaint   Patient presents with   • Abnormal Lab      Subjective   She feels okay today other than still feeling fatigued and tired.  Her appetite is poor.  She denies any fevers this morning.  Rested decent overnight.  General: No Fever or Chills, Cardiac: No Chest Pain or Palpitations, Resp: No Cough or SOA, GI: No Nausea, Vomiting, or Diarrhea and Other: No bleeding      atorvastatin 10 mg Oral Daily   b complex-vitamin c-folic acid 1 tablet Oral Q PM   budesonide-formoterol 2 puff Inhalation BID - RT   ertapenem 500 mg Intravenous Q24H   insulin lispro 0-9 Units Subcutaneous 4x Daily With Meals & Nightly   metoprolol succinate XL 25 mg Oral Daily   midodrine 10 mg Oral TID AC   mirtazapine 45 mg Oral Daily   sodium chloride 10 mL Intravenous Q12H          Objective   Vital Signs  Temp:  [96.7 °F (35.9 °C)-98.1 °F (36.7 °C)] 97.5 °F (36.4 °C)  Heart Rate:  [63-77] 77  Resp:  [16] 16  BP: ()/(65-81) 97/65  Body mass index is 33.27 kg/m².    Intake/Output Summary (Last 24 hours) at 2020 1248  Last data filed at 2020 0946  Gross per 24 hour   Intake 500 ml   Output --   Net 500 ml       Physical Exam   Constitutional: She is oriented to person, place, and time. She appears well-developed and well-nourished.   HENT:   Head: Normocephalic and atraumatic.   Cardiovascular: Normal rate and regular rhythm.   Pulmonary/Chest: Effort normal and breath sounds normal.   Abdominal: Soft. Bowel sounds are normal.   Neurological: She is alert and oriented to person, place, and time.   Skin: Skin is warm and dry.   Psychiatric: She has a normal mood and affect. Her behavior is normal.   Nursing note and vitals reviewed.        Results Review:       I reviewed the patient's new clinical results.  Results from last 7 days   Lab Units 20  0932 20  0448 20  1134 20  0444  05/01/20  1533   WBC 10*3/mm3 14.15* 11.51*  --  8.12 6.88   HEMOGLOBIN g/dL 8.7* 9.3*  --  8.5* 9.2*   PLATELETS 10*3/mm3 45* 39*  39* 36* 30* 35*     Results from last 7 days   Lab Units 05/03/20  0447 05/02/20  0444 05/01/20  1533   SODIUM mmol/L 144 140 142   POTASSIUM mmol/L 3.1* 3.0* 3.3*   CHLORIDE mmol/L 100 96* 97*   CO2 mmol/L 25.6 25.5 27.9   BUN mg/dL 37* 31* 25*   CREATININE mg/dL 4.61* 3.96* 3.37*   CALCIUM mg/dL 8.0* 8.0* 8.3*   MAGNESIUM mg/dL 1.8  --   --    Estimated Creatinine Clearance: 11.9 mL/min (A) (by C-G formula based on SCr of 4.61 mg/dL (H)).  Lab Results   Component Value Date    HGBA1C 5.62 (H) 05/02/2020    HGBA1C 7.6 (H) 02/11/2020     Glucose   Date/Time Value Ref Range Status   05/04/2020 1135 154 (H) 70 - 130 mg/dL Final   05/04/2020 0456 137 (H) 70 - 130 mg/dL Final   05/03/2020 2219 105 70 - 130 mg/dL Final   05/03/2020 1650 87 70 - 130 mg/dL Final   05/03/2020 1127 154 (H) 70 - 130 mg/dL Final   05/03/2020 0650 117 70 - 130 mg/dL Final   05/02/2020 2120 100 70 - 130 mg/dL Final   05/02/2020 1626 87 70 - 130 mg/dL Final         Assessment/Plan     Gram-negative bacteremia    Type 2 diabetes mellitus with chronic kidney disease on chronic dialysis, with long-term current use of insulin (CMS/[facility])    Primary serous carcinoma of uterine adnexa (CMS/HCC)    Stage IVB serous primary peritoneal carcinoma    Hypertension    ESRD on dialysis (CMS/[facility])    COPD (chronic obstructive pulmonary disease) (CMS/[facility])    Personal history of DVT/PE    Chemotherapy-induced thrombocytopenia    Aortic stenosis      PLAN  This is a 74-year-old female with a history of stage IV peritoneal carcinoma followed in the outpatient setting by [facility], she presents to the hospital with fevers and chills, with positive blood cultures drawn on April 29 1.  Bacteremia: Plan to continue IV antibiotic therapy.  Appreciate infectious diseases input.  Repeat cultures have been drawn and negative thus far,  remains on IV Zosyn, WBC increasing?  2.  Hypokalemia: Defer replacement to nephrology.  Given her end-stage renal disease on dialysis hesitant to be too aggressive with replacement.  3.  End-stage renal disease: Nephrology consulted dialysis per their orders.  4.  COPD: Lungs are clear no signs of exacerbation  5.  Type 2 diabetes: Continue sliding scale insulin adjust as needed.  6.  History of DVT/PE and IVC filter placement: off AC    Disposition  Maybe out tomorrow if continues clinical improvement      Elliot Berry MD  [facility]  05/04/20  12:48

## 2020-05-05 NOTE — PROGRESS NOTES
Continued Stay Note  [facility]     Patient Name: Lucía Wise  MRN: [id number]  Today's Date: 5/5/2020    Admit Date: 5/1/2020    Discharge Plan     Row Name 05/05/20 1202       Plan    Plan Comments  Spoke with patient this morning regarding DC plans. Wanted CCP to speak with daughter, Leland. Phoned at 0940 and daughter was in a meeting. Phoned again at 1203 and VM left.  Per Brittni with [facility], patient has been at [facility] since March 5th as skilled. No bed hold but can return.         Discharge Codes    No documentation.       Expected Discharge Date and Time     Expected Discharge Date Expected Discharge Time    May 5, 2020             Ingrid Mcpherson RN

## 2020-05-05 NOTE — PLAN OF CARE
Problem: Patient Care Overview  Goal: Plan of Care Review  Outcome: Ongoing (interventions implemented as appropriate)  Flowsheets (Taken 5/5/2020 0301)  Progress: improving  Plan of Care Reviewed With: patient  Outcome Summary: VSS. No c/o pain. Turn q2hr. Magic barrier cream applied to skin tears on midsection/legs/buttocks. Scant vaginal bleeding, dark red. Sliding scale insulin held last night, parameters not met. Slept well.

## 2020-05-05 NOTE — PROGRESS NOTES
LOS: 4 days       Chief Complaint: Primary peritoneal carcinoma, ESRD on hemodialysis, gram-negative bacteremia, thrombocytopenia, anemia, history of bilateral DVT/pulmonary emboli in February 2020.      Interval History: Patient reports that she is somewhat improved today.  She is not experiencing any further nausea.  Her bowel function remains about the same, approximately 2 loose to watery stools per day which is her baseline.  She notes ongoing fatigue.  She has ongoing  discomfort related to the wounds around her buttocks.    Review of Systems:    Review of systems was obtained with pertinent positive findings as noted in the interval history.  All other systems negative.    Objective     Vital Signs  Temp:  [96.7 °F (35.9 °C)-97.4 °F (36.3 °C)] 97.4 °F (36.3 °C)  Heart Rate:  [] 101  Resp:  [16-18] 16  BP: (121-127)/(70-79) 127/79        Physical Exam:     GENERAL: Elderly female no distress lying in bed  SKIN:  Warm, dry without rashes, purpura or petechiae.  MOUTH:  Tongue is well-papillated; no stomatitis or ulcers.  Lips normal.  CHEST: Clear to auscultation bilaterally.  MediPort and right chest tunneled catheter sites appear normal.  CARDIAC:  Regular rate and rhythm without murmurs, rubs or gallops. Normal S1,S2.  ABDOMEN:  Soft, nontender with no organomegaly or masses.  EXTREMITIES:  No clubbing, cyanosis or edema.  SKIN: Healing skin lesions noted around the thighs and buttocks.  NEUROLOGICAL:  Cranial Nerves II-XII grossly intact.  No focal neurological deficits.  PSYCHIATRIC:  Normal affect and mood.           Results Review:     I reviewed the patient's new clinical results.    Results from last 7 days   Lab Units 05/05/20  0634   WBC 10*3/mm3 16.30*   HEMOGLOBIN g/dL 8.5*   HEMATOCRIT % 26.0*   PLATELETS 10*3/mm3 54*     Lab Results   Component Value Date    NEUTROABS 11.57 (H) 05/05/2020     Results from last 7 days   Lab Units 05/03/20  0447   SODIUM mmol/L 144   POTASSIUM mmol/L 3.1*    CHLORIDE mmol/L 100   CO2 mmol/L 25.6   BUN mg/dL 37*   CREATININE mg/dL 4.61*   GLUCOSE mg/dL 133*   CALCIUM mg/dL 8.0*     Results from last 7 days   Lab Units 05/01/20  2225   INR  1.38*     Results from last 7 days   Lab Units 05/03/20  0447   MAGNESIUM mg/dL 1.8         Medication Review: Yes     Assessment/Plan     1. Primary peritoneal carcinoma:  · Treated by  in the [facility]  · Found to have evidence of peritoneal carcinomatosis on CT scan 2/11/2020 as well as retroperitoneal and pelvic lymphadenopathy.  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  Cycle 2 received 4/21/2020.  No notation of growth factor support.  · Declining Ca125 from 11,200 at diagnosis 3/17/2020 down to 4480 on 4/21/2020.  · Patient currently admitted following cycle 2 chemotherapy with gram-negative bacteremia.  Patient has not experienced any chemotherapy-induced neutropenia.  · GYN oncology at [facility] has been consulted however has not replied.  Patient will be returning to see them as outpatient after discharge.  2. Thrombocytopenia:  · Secondary to chemotherapy as well as consumption from current bacteremia  · Platelet count at time of cycle 2 chemotherapy on 4/21/2020 was 123,000  · Platelet count on admission 5/1/2020 was 35,000  · IPF significantly elevated on 5/2/2020 at 20.5%  · Platelet count today with spontaneous improvement at 54,000, no current bleeding issues, no indication for platelet transfusion at this time.  3. Multifactorial anemia:  · Patient with anemia secondary to ESRD, underlying malignancy, chemotherapy, bacteremia.  · Hemoglobin on admission 5/1/2020 at 9.2  · Evaluation 5/2/2020 with iron 35, ferritin 1108, iron saturation  28%, TIBC 127, folate 15, B12 greater than 2000, haptoglobin 276  · Labs consistent with anemia secondary to chronic disease/malignancy/ESRD  · Defer SHAINA to nephrology as patient is continuing with hemodialysis 3 times per week  · Hemoglobin today 8.5, no indication for transfusion support.  4. Leukocytosis:  · WBC does continue to escalate up to 16,000 today with left shifted differential indicating marrow recovery from chemotherapy as well as response to recent infection.  · As above, no indication in the records that the patient has received any growth factor support.  5. Gram-negative bacteremia:  · Infectious disease following, apparently culture with ESBL E. Coli  · Currently receiving ertapenem  6. ESRD on hemodialysis:  · Patient undergoing hemodialysis 3 times per week, nephrology following  · Note plans for dialysis today.    Plan:  1. No current need for transfusion support in regards to anemia and thrombocytopenia  2. Defer need for SHAINA to nephrology as patient is continuing on hemodialysis  3. The patient will be returning to outpatient follow-up with [facility] when discharged.  4. Daily CBC/differential    Discussed with patient at bedside.  We will follow in hospital.    Han Reveles MD  05/05/20  09:46

## 2020-05-05 NOTE — PROGRESS NOTES
Case Management Discharge Note      Final Note: Discharged to [facility] rehab via  EMS. Ingrid Mcpherson RN         Destination - Selection Complete      Service Provider Request Status Selected Services Address Phone Number Fax Number    [facility] Selected Skilled Nursing [id number] [address] [phone]           Transportation Services  Ambulance: [facility]    Final Discharge Disposition Code: 03 - skilled nursing facility (SNF)

## 2020-05-05 NOTE — PROGRESS NOTES
Continued Stay Note  [facility]     Patient Name: Lucía Wise  MRN: [id number]  Today's Date: 5/5/2020    Admit Date: 5/1/2020    Discharge Plan     Row Name 05/05/20 1255       Plan    Plan  [facility] skilled    Plan Comments  Discharge order noted. DC summary faxed. Transportation scheduled with  EMS for 5pm. Packet given to RN. Hilary Elkins with [facility] of transportation time.     Row Name 05/05/20 1210       Plan    Plan  Return to [facility] skilled    Plan Comments  Received call from daughterLeland who confirmed to DC plan is for patient to return to [facility]. Stated transportation will need to be scheduled.     Row Name 05/05/20 1202       Plan    Plan Comments  Spoke with patient this morning regarding DC plans. Wanted [facility] to speak with daughterLeland. Phoned at 0940 and daughter was in a meeting. Phoned again at 1203 and VM left.  Per Brittni with [facility], patient has been at [facility] since March 5th as skilled. No bed hold but can return.         Discharge Codes    No documentation.       Expected Discharge Date and Time     Expected Discharge Date Expected Discharge Time    May 5, 2020             Ingrid Mcpherson, RN

## 2020-05-05 NOTE — PROGRESS NOTES
Continued Stay Note  [facility]     Patient Name: Lucía Wise  MRN: [id number]  Today's Date: 5/5/2020    Admit Date: 5/1/2020    Discharge Plan     Row Name 05/05/20 1210       Plan    Plan  Return to [facility] skilled    Plan Comments  Received call from daughterLeland who confirmed to DC plan is for patient to return to [facility]. Stated transportation will need to be scheduled.     Row Name 05/05/20 1202       Plan    Plan Comments  Spoke with patient this morning regarding DC plans. Wanted [facility] to speak with daughterLeland. Phoned at 0940 and daughter was in a meeting. Phoned again at 1203 and VM left.  Per Brittni with [facility], patient has been at [facility] since March 5th as skilled. No bed hold but can return.         Discharge Codes    No documentation.       Expected Discharge Date and Time     Expected Discharge Date Expected Discharge Time    May 5, 2020             Ingrid Mcpherson RN

## 2020-05-05 NOTE — PROGRESS NOTES
Continued Stay Note  [facility]     Patient Name: Lucía Wise  MRN: [id number]  Today's Date: 5/5/2020    Admit Date: 5/1/2020    Discharge Plan     Row Name 05/05/20 1642       Plan    Plan  [facility] skilled    Plan Comments  Bed not ready at [facility]. Spoke to Radha with [facility]. Time changed to 8:30pm        Discharge Codes    No documentation.       Expected Discharge Date and Time     Expected Discharge Date Expected Discharge Time    May 5, 2020             Ingrid Mcpherson RN

## 2020-05-05 NOTE — PROGRESS NOTES
"Physicians Statement of Medical Necessity for  Ambulance Transportation    GENERAL INFORMATION     Name: Lucía Wise  YOB: 1945  Medicare #: [id number]  Transport Date: 5/5/2020 (Valid for round trips this date, or for scheduled repetitive trips for 60 days from the date signed below.)  Origin: [facility]  Destination: [facility]  Is the Patient's stay covered under Medicare Part A (PPS/DRG?)Yes   Closest appropriate facility? Yes  If this a hosp-hosp transfer? No  Is this a hospice patient? No    MEDICAL NECESSITY QUESTIONAIRE    Ambulance Transportation is medically necessary only if other means of transportation are contraindicated or would be potentially harmful to the patient.  To meet this requirement, the patient must be either \"bed confined\" or suffer from a condition such that transport by means other than an ambulance is contraindicated by the patient's condition.  The following questions must be answered by the healthcare professional signing below for this form to be valid:     1) Describe the MEDICAL CONDITION (physical and/or mental) of this patient AT THE TIME OF AMBULANCE TRANSPORT that requires the patient to be transported in an ambulance, and why transport by other means is contraindicated by the patient's condition:   Past Medical History:   Diagnosis Date   • Acute kidney failure (CMS/HCC)    • Acute renal failure on dialysis (CMS/HCC)    • Anemia    • Anxiety    • Cancer (CMS/HCC)     perotineal cancer   • Depression    • Diabetes (CMS/HCC)    • DVT (deep venous thrombosis) (CMS/HCC)    • ESRD (end stage renal disease) (CMS/HCC)    • Family history of peritoneal cancer    • GERD (gastroesophageal reflux disease)    • History of transfusion    • Hyperparathyroidism (CMS/HCC)    • Hypertension    • Kidney stones    • Maintenance chemotherapy     last chemo was april 28/2020 (2nd dose) every 3 weeks for 6 doses   • Protein-calorie malnutrition (CMS/HCC)    • " "Pulmonary embolism (CMS/[facility])     Acute   • Uremia    • Vitamin D deficiency    • Weakness generalized       History reviewed. No pertinent surgical history.   2) Is this patient \"bed confined\" as defined below?Yes    To be \"bed confined\" the patient must satisfy all three of the following criteria:  (1) unable to get up from bed without assistance; AND (2) unable to ambulate;  AND (3) unable to sit in a chair or wheelchair.  3) Can this patient safely be transported by car or wheelchair van (I.e., may safely sit during transport, without an attendant or monitoring?)No   4. In addition to completing questions 1-3 above, please check any of the following conditions that apply*:          *Note: supporting documentation for any boxes checked must be maintained in the patient's medical records Patient is confused, Medical attendant required and Unable to tolerate seated position for time needed to transport. Nonambulatory. Falls Risk       SIGNATURE OF PHYSICIAN OR OTHER AUTHORIZED HEALTHCARE PROFESSIONAL    I certify that the above information is true and correct based on my evaluation of this patient, and represent that the patient requires transport by ambulance and that other forms of transport are contraindicated.  I understand that this information will be used by the Centers for Medicare and Medicaid Services (CMS) to support the determiniation of medical necessity for ambulance services, and I represent that I have personal knowledge of the patient's condition at the time of transport.    X   If this box is checked, I also certify that the patient is physically or mentally incapable of signing the ambulance service's claim form and that the institution with which I am affiliated has furnished care, services or assistance to the patient.  My signature below is made on behalf of the patient pursuant to 42 .36(b)(4). In accordance with 42 .37, the specific reason(s) that the patient is physically or " mentally incapable of signing the claim for is as follows:     Signature of Physician or Healthcare Professional  Date/Time:   5/5/2020 2215     (For Scheduled repetitive transport, this form is not valid for transports performed more than 60 days after this date).                                                                                                                                            --------------------------------------------------------------------------------------------  Printed Name and Credentials of Physician or Authorized Healthcare Professional     *Form must be signed by patient's attending physician for scheduled, repetitive transports,.  For non-repetitive ambulance transports, if unable to obtain the signature of the attending physician, any of the following may sign (please select below):     Physician  Clinical Nurse Specialist  Registered Nurse     Physician Assistant x Discharge Planner  Licensed Practical Nurse     Nurse Practitioner x

## 2020-05-05 NOTE — PROGRESS NOTES
INFECTIOUS DISEASES PROGRESS NOTE    CC: f/u GNR    S:   She denies any diarrhea.  No fevers chills or night sweats.  No significant pain.  Cough improved.    O:  Physical Exam:  Temp:  [96.7 °F (35.9 °C)-97.4 °F (36.3 °C)] 97.4 °F (36.3 °C)  Heart Rate:  [] 69  Resp:  [16-18] 16  BP: (121-138)/(70-86) 138/86  Physical Exam   Constitutional: She appears well-developed. No distress.   Pulmonary/Chest: Effort normal.   Abdominal: Soft. She exhibits no distension. There is no tenderness.   Neurological: She is alert.   Skin: Skin is warm and dry.   Psychiatric: She has a normal mood and affect. Her behavior is normal.        Diagnostics:    White count 16.3  Hemoglobin 8.5  Platelets 54    Microbiology:  5/1 BCx NGTD  4/29/2020 dialysis blood cultures 2/2 ESBL E. coli     Estimated Creatinine Clearance: 11.6 mL/min (A) (by C-G formula based on SCr of 4.61 mg/dL (H)).    Assessment/Plan   1.  Gram-negative bacteremia  2.  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  End-stage renal disease    Stable.  Continue ertapenem 500 mg IV every 24 hours as laid out in yesterday's notes.  Discussed with Dr. Berry.  Her white count has increased some but she is clinically stable and do not see any evidence of uncontrolled infection.  I went back and looked at her chemotherapy notes and I do not see any mention of growth factor support during her recent chemotherapy infusion.  If she did receive some and it just was not mention that would be the most likely cause of her leukocytosis.  Otherwise a melodramatic rise after myelosuppression possible.  For now given stability and close follow-up, okay to discharge from my end

## 2020-05-05 NOTE — DISCHARGE SUMMARY
Patient Name: Lucía Wise  : 1945  MRN: [id number]    Date of Admission: 2020  Date of Discharge:  2020  Primary Care Physician: Lorelei Campos DO      Chief Complaint:   Abnormal Lab    Discharge Diagnoses     Active Hospital Problems    Diagnosis  POA   • **Gram-negative bacteremia [R78.81]  Yes   • Type 2 diabetes mellitus with chronic kidney disease on chronic dialysis, with long-term current use of insulin (CMS/[facility]) [E11.22, N18.6, Z99.2, Z79.4]  Not Applicable   • Primary serous carcinoma of uterine adnexa (CMS/[facility]) [C57.4]  Yes   • 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        Hospital Course     Ms. Wise is a 74 y.o. female with a history of end-stage renal disease and diabetes who presented to [facility] initially complaining of abnormal labs.  Please see the admitting history and physical for further details.  She was found to have bacteremia on blood cultures drawn at dialysis and was admitted to the hospital for further evaluation and treatment.  She was admitted to the hospital infectious disease and nephrology were consulted.  Repeat blood cultures were drawn.  She was started on IV Zosyn initially and completed 3 days of that therapy here in the hospital.  After sensitivities were obtained on her outpatient cultures it was determined she had ESBL.  It was sensitive to Zosyn.  She was then transitioned from IV Zosyn to ertapenem with plans to complete 5 more days of therapy at discharge.  She has had some mild diarrhea but otherwise no other symptoms and this is  resolved this morning.  She is been cleared for discharge by oncology and infectious disease with plans to follow-up in the outpatient setting with her oncologist at [facility].  She will need to follow-up with her nephrologist as directed.  The plan is been discussed with the patient at the bedside at this point she remains stable for discharge.  Of note at the time of discharge her white blood cell count is curiously trending up.  Discussed this with infectious disease and they feel it is probably related to her chemotherapy or underlying malignancy.  There does not seem to be an infectious source for this.  She is not been having fevers and clinically has shown improvement.      Day of Discharge     Doing okay today.  Denies any issues or complaints no fevers overnight    Physical Exam:  Temp:  [96.7 °F (35.9 °C)-97.4 °F (36.3 °C)] 97.4 °F (36.3 °C)  Heart Rate:  [] 69  Resp:  [16-18] 16  BP: (121-138)/(70-86) 138/86  Body mass index is 31.66 kg/m².  Physical Exam   Constitutional: She is oriented to person, place, and time. She appears well-developed and well-nourished. No distress.   Cardiovascular: Normal rate and regular rhythm.   Pulmonary/Chest: Effort normal and breath sounds normal.   Neurological: She is alert and oriented to person, place, and time.   Psychiatric: She has a normal mood and affect.   Nursing note and vitals reviewed.      Consultants     Consult Orders (all) (From admission, onward)     Start     Ordered    05/02/20 1215  Inpatient Gynecologic Oncology Consult  Once     Specialty:  Gynecologic Oncology  Provider:  Lorelei Lizama,     05/02/20 1214    05/02/20 1209  Inpatient Gynecologic Oncology Consult  Once     Specialty:  Gynecologic Oncology  Provider:  Lorelei Lizama, DO    05/02/20 1210    05/01/20 1854  Inpatient Case Management  Consult  Once     Provider:  (Not yet assigned)    05/01/20 1853    05/01/20 1643  Hematology & Oncology Inpatient Consult  Once      Specialty:  Hematology and Oncology  Provider:  Marcie Rodriguez MD PhD    05/01/20 1644    05/01/20 1639  Inpatient Nephrology Consult  Once     Specialty:  Nephrology  Provider:  Zain Luu MD    05/01/20 1644    05/01/20 1639  Inpatient Infectious Diseases Consult  Once     Specialty:  Infectious Diseases  Provider:  Eamon Vyas MD    05/01/20 1644    05/01/20 1606  [facility] (on-call MD unless specified) Details  Once     Specialty:  Hospitalist  Provider:  (Not yet assigned)    05/01/20 1605              Procedures     Imaging Results (All)     Procedure Component Value Units Date/Time    XR Chest PA & Lateral [[id number]] Collected:  05/03/20 0935     Updated:  05/03/20 0946    Narrative:       TWO-VIEW CHEST     HISTORY: Cough and fever. Carcinomatosis. Possible Covid 19 infection.     FINDINGS: There are no prior exams for comparison. The lungs are  moderately expanded with some vague haziness at the left base suspicious  for an area of developing pneumonia and continued follow-up evaluation  is recommended. The heart size is normal.     A left-sided MediPort catheter ends in the right atrium. A larger gauge  vascular catheter ends in the right atrium.     This report was finalized on 5/3/2020 9:43 AM by Dr. Sinan Santana M.D.             Pertinent Labs     Results from last 7 days   Lab Units 05/05/20  0634 05/04/20  0932 05/03/20  0448 05/02/20  1134 05/02/20  0444   WBC 10*3/mm3 16.30* 14.15* 11.51*  --  8.12   HEMOGLOBIN g/dL 8.5* 8.7* 9.3*  --  8.5*   PLATELETS 10*3/mm3 54* 45* 39*  39* 36* 30*     Results from last 7 days   Lab Units 05/03/20  0447 05/02/20  0444 05/01/20  1533   SODIUM mmol/L 144 140 142   POTASSIUM mmol/L 3.1* 3.0* 3.3*   CHLORIDE mmol/L 100 96* 97*   CO2 mmol/L 25.6 25.5 27.9   BUN mg/dL 37* 31* 25*   CREATININE mg/dL 4.61* 3.96* 3.37*   GLUCOSE mg/dL 133* 136* 122*   Estimated Creatinine Clearance: 11.6 mL/min (A) (by C-G formula based on SCr of 4.61 mg/dL  (H)).  Results from last 7 days   Lab Units 05/03/20  0447 05/01/20  1533   ALBUMIN g/dL 1.90* 2.00*   BILIRUBIN mg/dL 0.6 0.8   ALK PHOS U/L 121* 98   AST (SGOT) U/L 49* 28   ALT (SGPT) U/L 24 18     Results from last 7 days   Lab Units 05/03/20  0447 05/02/20  0444 05/01/20  1533   CALCIUM mg/dL 8.0* 8.0* 8.3*   ALBUMIN g/dL 1.90*  --  2.00*   MAGNESIUM mg/dL 1.8  --   --      Results from last 7 days   Lab Units 05/01/20  1533   LIPASE U/L 19             Invalid input(s): LDLCALC  Results from last 7 days   Lab Units 05/01/20  1533   BLOODCX  No growth at 3 days       Test Results Pending at Discharge      Order Current Status    Immunofixation, Serum In process    Blood Culture - Blood, Blood, Central Line Preliminary result          Discharge Details        Discharge Medications      New Medications      Instructions Start Date   ertapenem 500 mg in sodium chloride 0.9 % 50 mL IVPB   500 mg, Intravenous, Every 24 Hours         Continue These Medications      Instructions Start Date   acetaminophen 325 MG tablet  Commonly known as:  TYLENOL   650 mg, Oral, Every 6 Hours PRN      ADVAIR HFA IN   1 puff, Inhalation, 2 Times Daily, 45-21 mcg      atorvastatin 10 MG tablet  Commonly known as:  LIPITOR   10 mg, Oral, Daily      bismuth subsalicylate 262 MG/15ML suspension  Commonly known as:  PEPTO BISMOL   15 mL, Oral, Every 8 Hours PRN      cloNIDine 0.1 MG tablet  Commonly known as:  CATAPRES   0.2 mg, Oral, As Needed      epoetin emy 3000 UNIT/ML injection  Commonly known as:  EPOGEN,PROCRIT   6,200 Units, Subcutaneous, 3 Times Weekly, Route is INTRAVENOUS per [facility] paperwork (would not let it be entered that way)      HYDROcodone-acetaminophen 5-325 MG per tablet  Commonly known as:  NORCO   1 tablet, Oral, Every 6 Hours PRN      insulin aspart 100 UNIT/ML injection  Commonly known as:  novoLOG   Subcutaneous, 3 Times Daily Before Meals, Sliding scale insulin, scale not listed on paperwork      iron sucrose 20  MG/ML injection  Commonly known as:  VENOFER   100 mg, Intravenous, 3 Times Weekly      lidocaine 5 %  Commonly known as:  LIDODERM   1 patch, Transdermal, Every 24 Hours, Remove & Discard patch within 12 hours or as directed by MD      loperamide 2 MG capsule  Commonly known as:  IMODIUM   2 mg, Oral, Every 6 Hours PRN      metoprolol succinate XL 25 MG 24 hr tablet  Commonly known as:  TOPROL-XL   25 mg, Oral, Daily      midodrine 10 MG tablet  Commonly known as:  PROAMATINE   10 mg, Oral, 3 Times Daily Before Meals      mirtazapine 45 MG tablet  Commonly known as:  REMERON   45 mg, Oral, Daily      nitroglycerin 0.4 MG SL tablet  Commonly known as:  NITROSTAT   0.4 mg, Sublingual, Every 5 Minutes PRN, Take no more than 3 doses in 15 minutes.      potassium phosphate (monobasic) 500 MG tablet  Commonly known as:  K-PHOS   500 mg, Oral, Daily, Only mon and Friday 1 tablet only  Before dialysis till  05/04/20 only.       promethazine 25 MG tablet  Commonly known as:  PHENERGAN   12.5-25 mg, Oral, Every 6 Hours PRN      Kristen-Inés tablet   1 tablet, Oral, Every Evening             Allergies   Allergen Reactions   • Ibuprofen Other (See Comments)     Kidney failure   • Aspirin Unknown - Low Severity         Discharge Disposition:  Skilled Nursing Facility (DC - External)    Discharge Diet:  Diet Order   Procedures   • Diet Soft Texture; Whole Foods; Renal, Consistent Carbohydrate       Discharge Activity:   Activity Instructions     Activity as Tolerated            CODE STATUS:    Code Status and Medical Interventions:   Ordered at: 05/01/20 2964     Level Of Support Discussed With:    Patient     Code Status:    CPR     Medical Interventions (Level of Support Prior to Arrest):    Full       No future appointments.  Additional Instructions for the Follow-ups that You Need to Schedule     Discharge Follow-up with PCP   As directed       Currently Documented PCP:    Lorelei Campos DO    PCP Phone Number:     [phone]     Follow Up Details:  4-6 weeks         Discharge Follow-up with Specified Provider: nephrology as directed   As directed      To:  nephrology as directed           Follow-up Information     Lorelei Campos DO .    Specialty:  Internal Medicine  Why:  4-6 weeks  Contact information:  [address] [place] [address]  [phone]                   Additional Instructions for the Follow-ups that You Need to Schedule     Discharge Follow-up with PCP   As directed       Currently Documented PCP:    Lorelei Campos DO    PCP Phone Number:    [phone]     Follow Up Details:  4-6 weeks         Discharge Follow-up with Specified Provider: nephrology as directed   As directed      To:  nephrology as directed           Time Spent on Discharge:  Greater than 30 minutes      Elliot Berry MD  [facility]  05/05/20  11:49 AM

## 2020-05-05 NOTE — PROGRESS NOTES
Case Management Discharge Note      Final Note: Discharged to [facility] rehab via  EMS. Ingrid Mcpherson RN         Destination - Selection Complete      Service Provider Request Status Selected Services Address Phone Number Fax Number    [facility] Selected Skilled Nursing [facility] [facility] [address] [phone]           Transportation Services  Ambulance: [facility]    Final Discharge Disposition Code: 03 - skilled nursing facility (SNF)

## 2020-06-05 PROBLEM — N17.9 SEPSIS WITH ACUTE RENAL FAILURE AND SEPTIC SHOCK (HCC): Status: ACTIVE | Noted: 2020-01-01

## 2020-06-05 PROBLEM — R65.21 SEPSIS WITH ACUTE RENAL FAILURE AND SEPTIC SHOCK (HCC): Status: ACTIVE | Noted: 2020-01-01

## 2020-06-05 PROBLEM — A41.9 SEPSIS WITH ACUTE RENAL FAILURE AND SEPTIC SHOCK (HCC): Status: ACTIVE | Noted: 2020-01-01

## 2020-06-05 NOTE — ED PROVIDER NOTES
EMERGENCY DEPARTMENT ENCOUNTER    Room Number:  07/07  Date of encounter:  6/5/2020  PCP: Lorelei Campos DO  Historian: EMS report and daughter      HPI:  Chief Complaint: Moaning in pain  A complete HPI/ROS/PMH/PSH/SH/FH are unobtainable due to: Patient nonverbal and moaning    Context: Lucía Wise is a 75 y.o. female who arrives to the ED via EMS from [facility].  Patient was sent in from dialysis secondary to hypoglycemia and moaning out in pain and not talking to the nurses.  Unable to obtain HPI or review of symptoms secondary to patient only moaning and nonverbal.        PAST MEDICAL HISTORY  Active Ambulatory Problems     Diagnosis Date Noted   • Gram-negative bacteremia 05/01/2020   • Type 2 diabetes mellitus with chronic kidney disease on chronic dialysis, with long-term current use of insulin (CMS/Formerly [facility]) 05/01/2020   • Primary serous carcinoma of uterine adnexa (CMS/Formerly [facility]) 05/01/2020   • Stage IVB serous primary peritoneal carcinoma 05/01/2020   • Hypertension 05/01/2020   • ESRD on dialysis (CMS/Formerly [facility]) 05/01/2020   • COPD (chronic obstructive pulmonary disease) (CMS/Formerly [facility]) 05/01/2020   • Personal history of DVT/PE 05/01/2020   • Chemotherapy-induced thrombocytopenia 05/01/2020   • Aortic stenosis 05/01/2020     Resolved Ambulatory Problems     Diagnosis Date Noted   • No Resolved Ambulatory Problems     Past Medical History:   Diagnosis Date   • Acute kidney failure (CMS/Formerly [facility])    • Acute renal failure on dialysis (CMS/Formerly [facility])    • Anemia    • Anxiety    • Cancer (CMS/Formerly [facility])    • Depression    • Diabetes (CMS/Formerly [facility])    • DVT (deep venous thrombosis) (CMS/Formerly [facility])    • ESRD (end stage renal disease) (CMS/Formerly [facility])    • Family history of peritoneal cancer    • GERD (gastroesophageal reflux disease)    • History of transfusion    • Hyperparathyroidism (CMS/Formerly [facility])    • Kidney stones    • Maintenance chemotherapy    • Protein-calorie malnutrition (CMS/Formerly [facility])    • Pulmonary embolism (CMS/Formerly [facility])    • Uremia    •  Vitamin D deficiency    • Weakness generalized          PAST SURGICAL HISTORY  No past surgical history on file.      FAMILY HISTORY  No family history on file.      SOCIAL HISTORY  Social History     Socioeconomic History   • Marital status: Single     Spouse name: Not on file   • Number of children: Not on file   • Years of education: Not on file   • Highest education level: Not on file   Tobacco Use   • Smoking status: Former Smoker     Types: Cigarettes   Substance and Sexual Activity   • Alcohol use: Never     Frequency: Never     Comment: not seen from other facility records   • Drug use: Never     Comment: not seen from other facility records   • Sexual activity: Defer         ALLERGIES  Ibuprofen and Aspirin        REVIEW OF SYSTEMS  Review of Systems   Unobtainable secondary to patient being nonverbal and moaning in pain       PHYSICAL EXAM    I have reviewed the triage vital signs and nursing notes.    ED Triage Vitals   Temp Heart Rate Resp BP SpO2   06/05/20 1345 06/05/20 1322 06/05/20 1345 06/05/20 1322 06/05/20 1356   96.9 °F (36.1 °C) 82 18 (!) 99/38 100 %      Temp src Heart Rate Source Patient Position BP Location FiO2 (%)   06/05/20 1345 06/05/20 1322 06/05/20 1322 06/05/20 1322 --   Rectal Monitor Lying Right arm        Physical Exam  GENERAL: Chronically ill-appearing, moaning out in pain,   HENT: normocephalic, atraumatic  EYES: no scleral icterus, PERRL  CV: regular rhythm, regular rate, no murmur  RESPIRATORY: normal effort, CTAB  ABDOMEN: soft, nontender, normal bowel sounds  /GI deferred  MUSCULOSKELETAL: no deformity  NEURO: alert, moves all extremities, unable to assess neuro secondary to nonverbal  SKIN: warm, dry,   Patient has stage I breakdown to her lower abdomen, coccyx and bilateral buttocks  Psych: Moaning         LAB RESULTS  Recent Results (from the past 24 hour(s))   POC Glucose Once    Collection Time: 06/05/20  1:29 PM   Result Value Ref Range    Glucose 93 70 - 130 mg/dL    Lactic Acid, Plasma    Collection Time: 06/05/20  1:45 PM   Result Value Ref Range    Lactate 6.0 (C) 0.5 - 2.0 mmol/L     Labs Reviewed   URINE CULTURE - Abnormal; Notable for the following components:       Result Value    Urine Culture Yeast isolated (*)     All other components within normal limits   LACTIC ACID, PLASMA - Abnormal; Notable for the following components:    Lactate 6.0 (*)     All other components within normal limits   COMPREHENSIVE METABOLIC PANEL - Abnormal; Notable for the following components:    Creatinine 2.79 (*)     CO2 21.2 (*)     Calcium 7.1 (*)     Total Protein 5.6 (*)     Albumin 1.50 (*)     ALT (SGPT) 41 (*)     AST (SGOT) 62 (*)     Alkaline Phosphatase 213 (*)     eGFR   Amer 20 (*)     BUN/Creatinine Ratio 3.6 (*)     Anion Gap 15.8 (*)     All other components within normal limits    Narrative:     GFR Normal >60  Chronic Kidney Disease <60  Kidney Failure <15     URINALYSIS W/ MICROSCOPIC IF INDICATED (NO CULTURE) - Abnormal; Notable for the following components:    Color, UA Dark Yellow (*)     Appearance, UA Turbid (*)     Ketones, UA Trace (*)     Blood, UA Large (3+) (*)     Protein, UA >=300 mg/dL (3+) (*)     Leuk Esterase, UA Large (3+) (*)     All other components within normal limits   PROCALCITONIN - Abnormal; Notable for the following components:    Procalcitonin 10.33 (*)     All other components within normal limits    Narrative:     As a Marker for Sepsis (Non-Neonates):   1. <0.5 ng/mL represents a low risk of severe sepsis and/or septic shock.  1. >2 ng/mL represents a high risk of severe sepsis and/or septic shock.    As a Marker for Lower Respiratory Tract Infections that require antibiotic therapy:  PCT on Admission     Antibiotic Therapy             6-12 Hrs later  > 0.5                Strongly Recommended            >0.25 - <0.5         Recommended  0.1 - 0.25           Discouraged                   Remeasure/reassess PCT  <0.1                  "Strongly Discouraged          Remeasure/reassess PCT      As 28 day mortality risk marker: \"Change in Procalcitonin Result\" (> 80 % or <=80 %) if Day 0 (or Day 1) and Day 4 values are available. Refer to http://www.[facility]-pct-calculator.com/   Change in PCT <=80 %   A decrease of PCT levels below or equal to 80 % defines a positive change in PCT test result representing a higher risk for 28-day all-cause mortality of patients diagnosed with severe sepsis or septic shock.  Change in PCT > 80 %   A decrease of PCT levels of more than 80 % defines a negative change in PCT result representing a lower risk for 28-day all-cause mortality of patients diagnosed with severe sepsis or septic shock.                Results may be falsely decreased if patient taking Biotin.    CBC WITH AUTO DIFFERENTIAL - Abnormal; Notable for the following components:    WBC 13.78 (*)     RBC 2.87 (*)     Hemoglobin 8.4 (*)     Hematocrit 25.1 (*)     RDW 17.8 (*)     RDW-SD 56.0 (*)     Platelets 42 (*)     All other components within normal limits   MANUAL DIFFERENTIAL - Abnormal; Notable for the following components:    Neutrophil % 94.0 (*)     Lymphocyte % 2.0 (*)     Monocyte % 3.0 (*)     Neutrophils Absolute 12.95 (*)     Lymphocytes Absolute 0.28 (*)     nRBC 4.0 (*)     All other components within normal limits   URINALYSIS, MICROSCOPIC ONLY - Abnormal; Notable for the following components:    RBC, UA Unable to determine due to loaded field (*)     WBC, UA Too Numerous to Count (*)     Bacteria, UA Unable to determine due to loaded field (*)     Squamous Epithelial Cells, UA Unable to determine due to loaded field (*)     All other components within normal limits   LACTIC ACID, REFLEX - Abnormal; Notable for the following components:    Lactate 6.7 (*)     All other components within normal limits   LACTIC ACID, PLASMA - Abnormal; Notable for the following components:    Lactate 6.7 (*)     All other components within normal limits "   RENAL FUNCTION PANEL - Abnormal; Notable for the following components:    Glucose 142 (*)     Creatinine 2.69 (*)     CO2 17.7 (*)     Calcium 7.2 (*)     Albumin 1.50 (*)     Anion Gap 18.3 (*)     BUN/Creatinine Ratio 3.7 (*)     eGFR   Amer 21 (*)     All other components within normal limits    Narrative:     GFR Normal >60  Chronic Kidney Disease <60  Kidney Failure <15     LIPID PANEL - Abnormal; Notable for the following components:    Triglycerides 161 (*)     HDL Cholesterol 8 (*)     All other components within normal limits    Narrative:     Cholesterol Reference Ranges  (U.S. Department of Health and Human Services ATP III Classifications)    Desirable          <200 mg/dL  Borderline High    200-239 mg/dL  High Risk          >240 mg/dL      Triglyceride Reference Ranges  (U.S. Department of Health and Human Services ATP III Classifications)    Normal           <150 mg/dL  Borderline High  150-199 mg/dL  High             200-499 mg/dL  Very High        >500 mg/dL    HDL Reference Ranges  (U.S. Department of Health and Human Services ATP III Classifcations)    Low     <40 mg/dl (major risk factor for CHD)  High    >60 mg/dl ('negative' risk factor for CHD)        LDL Reference Ranges  (U.S. Department of Health and Human Services ATP III Classifcations)    Optimal          <100 mg/dL  Near Optimal     100-129 mg/dL  Borderline High  130-159 mg/dL  High             160-189 mg/dL  Very High        >189 mg/dL   CBC WITH AUTO DIFFERENTIAL - Abnormal; Notable for the following components:    WBC 15.04 (*)     RBC 2.88 (*)     Hemoglobin 8.4 (*)     Hematocrit 25.3 (*)     RDW 18.0 (*)     RDW-SD 57.3 (*)     Platelets 42 (*)     All other components within normal limits   MANUAL DIFFERENTIAL - Abnormal; Notable for the following components:    Neutrophil % 94.0 (*)     Lymphocyte % 2.0 (*)     Monocyte % 4.0 (*)     Neutrophils Absolute 14.14 (*)     Lymphocytes Absolute 0.30 (*)     nRBC 7.0 (*)      All other components within normal limits   BLOOD GAS, ARTERIAL - Abnormal; Notable for the following components:    pCO2, Arterial 29.9 (*)     HCO3, Arterial 17.9 (*)     Base Excess, Arterial -6.2 (*)     All other components within normal limits   PROTIME-INR - Abnormal; Notable for the following components:    Protime 58.1 (*)     INR 6.67 (*)     All other components within normal limits   APTT - Abnormal; Notable for the following components:    PTT >200.0 (*)     All other components within normal limits   APTT - Abnormal; Notable for the following components:    PTT >200.0 (*)     All other components within normal limits   PROTIME-INR - Abnormal; Notable for the following components:    Protime 57.8 (*)     INR 6.62 (*)     All other components within normal limits   BLOOD GAS, ARTERIAL - Abnormal; Notable for the following components:    pH, Arterial 7.000 (*)     pCO2, Arterial 32.5 (*)     pO2, Arterial 64.0 (*)     HCO3, Arterial 8.0 (*)     Base Excess, Arterial -21.8 (*)     O2 Saturation Calculated 78.9 (*)     All other components within normal limits   RENAL FUNCTION PANEL - Abnormal; Notable for the following components:    Glucose 155 (*)     Creatinine 2.92 (*)     Chloride 108 (*)     CO2 6.5 (*)     Calcium 6.9 (*)     Albumin 1.20 (*)     Anion Gap 28.5 (*)     BUN/Creatinine Ratio 3.4 (*)     eGFR   Amer 19 (*)     All other components within normal limits    Narrative:     GFR Normal >60  Chronic Kidney Disease <60  Kidney Failure <15     LACTIC ACID, PLASMA - Abnormal; Notable for the following components:    Lactate 17.6 (*)     All other components within normal limits   CBC (NO DIFF) - Abnormal; Notable for the following components:    WBC 2.75 (*)     RBC 2.75 (*)     Hemoglobin 8.1 (*)     Hematocrit 25.0 (*)     RDW 18.4 (*)     RDW-SD 60.5 (*)     Platelets 26 (*)     All other components within normal limits   APTT - Abnormal; Notable for the following components:    PTT  >200.0 (*)     All other components within normal limits   RENAL FUNCTION PANEL - Abnormal; Notable for the following components:    Glucose 220 (*)     BUN 7 (*)     Creatinine 1.97 (*)     CO2 8.1 (*)     Calcium 7.0 (*)     Albumin 1.60 (*)     Anion Gap 28.9 (*)     BUN/Creatinine Ratio 3.6 (*)     eGFR   Amer 30 (*)     All other components within normal limits    Narrative:     GFR Normal >60  Chronic Kidney Disease <60  Kidney Failure <15     PROTIME-INR - Abnormal; Notable for the following components:    Protime 34.8 (*)     INR 3.49 (*)     All other components within normal limits   CBC WITH AUTO DIFFERENTIAL - Abnormal; Notable for the following components:    WBC 2.87 (*)     RBC 2.24 (*)     Hemoglobin 6.5 (*)     Hematocrit 20.0 (*)     RDW 18.1 (*)     RDW-SD 58.3 (*)     Platelets 12 (*)     All other components within normal limits   BLOOD GAS, ARTERIAL - Abnormal; Notable for the following components:    pH, Arterial 7.011 (*)     pCO2, Arterial 27.8 (*)     pO2, Arterial 277.5 (*)     HCO3, Arterial 7.0 (*)     Base Excess, Arterial -22.6 (*)     O2 Saturation Calculated 99.7 (*)     All other components within normal limits   MANUAL DIFFERENTIAL - Abnormal; Notable for the following components:    Monocyte % 20.2 (*)     Neutrophils Absolute 1.33 (*)     nRBC 161.9 (*)     All other components within normal limits   RENAL FUNCTION PANEL - Abnormal; Notable for the following components:    Glucose 326 (*)     BUN 6 (*)     Creatinine 1.92 (*)     Potassium 6.0 (*)     CO2 4.1 (*)     Calcium 6.3 (*)     Albumin 1.20 (*)     Phosphorus 6.8 (*)     Anion Gap 33.9 (*)     BUN/Creatinine Ratio 3.1 (*)     eGFR   Amer 31 (*)     All other components within normal limits    Narrative:     GFR Normal >60  Chronic Kidney Disease <60  Kidney Failure <15     CALCIUM, IONIZED - Abnormal; Notable for the following components:    Ionized Calcium 0.88 (*)     Ionized Calcium 3.5 (*)     All other  components within normal limits   CBC WITH AUTO DIFFERENTIAL - Abnormal; Notable for the following components:    RBC 1.66 (*)     Hemoglobin 5.1 (*)     Hematocrit 16.4 (*)     MCV 98.8 (*)     MCHC 31.1 (*)     RDW-SD 54.3 (*)     Platelets 58 (*)     All other components within normal limits   MANUAL DIFFERENTIAL - Abnormal; Notable for the following components:    Neutrophil % 29.0 (*)     Metamyelocyte % 2.0 (*)     Myelocyte % 1.0 (*)     Atypical Lymphocyte % 18.0 (*)     Neutrophils Absolute 1.06 (*)     nRBC 37.0 (*)     All other components within normal limits   POCT GLUCOSE FINGERSTICK - Abnormal; Notable for the following components:    Glucose 33 (*)     All other components within normal limits   POCT GLUCOSE FINGERSTICK - Abnormal; Notable for the following components:    Glucose 182 (*)     All other components within normal limits   COVID-19,[facility]-HOUSE, NP SWAB IN TRANSPORT MEDIA 8-12 HR TAT - Normal   VANCOMYCIN, RANDOM - Normal   HEMOGLOBIN A1C - Normal    Narrative:     Hemoglobin A1C Ranges:    Increased Risk for Diabetes  5.7% to 6.4%  Diabetes                     >= 6.5%  Diabetic Goal                < 7.0%   VANCOMYCIN, RANDOM - Normal   MAGNESIUM - Normal   POCT GLUCOSE FINGERSTICK - Normal   POCT GLUCOSE FINGERSTICK - Normal   POCT GLUCOSE FINGERSTICK - Normal   POCT GLUCOSE FINGERSTICK - Normal   POCT GLUCOSE FINGERSTICK - Normal   POCT GLUCOSE FINGERSTICK - Normal   POCT GLUCOSE FINGERSTICK - Normal   POCT GLUCOSE FINGERSTICK - Normal   BLOOD CULTURE   BLOOD CULTURE   RAINBOW DRAW    Narrative:     The following orders were created for panel order [place] Draw.  Procedure                               Abnormality         Status                     ---------                               -----------         ------                     Light Blue Top[[id number]]                                   Final result               Green Top (Gel)[[id number]]                                  Final  result               Lavender Top[[id number]]                                     Final result               Gold Top - SST[[id number]]                                   Final result                 Please view results for these tests on the individual orders.   LACTIC ACID REFLEX TIMER   BLOOD GAS, ARTERIAL   BLOOD GAS, ARTERIAL   BLOOD GAS, ARTERIAL   BLOOD GAS, ARTERIAL   POCT GLUCOSE FINGERSTICK   POCT GLUCOSE FINGERSTICK   POCT GLUCOSE FINGERSTICK   POCT GLUCOSE FINGERSTICK   POCT GLUCOSE FINGERSTICK   POCT GLUCOSE FINGERSTICK   POCT GLUCOSE FINGERSTICK   POCT GLUCOSE FINGERSTICK   PREPARE FRESH FROZEN PLASMA   TYPE AND SCREEN   PREPARE RBC   PREPARE PLATELET PHERESIS   LIGHT BLUE TOP   GREEN TOP   LAVENDER TOP   GOLD TOP - SST   CBC AND DIFFERENTIAL    Narrative:     The following orders were created for panel order CBC & Differential.  Procedure                               Abnormality         Status                     ---------                               -----------         ------                     CBC Auto Differential[[id number]]        Abnormal            Final result                 Please view results for these tests on the individual orders.   CBC AND DIFFERENTIAL    Narrative:     The following orders were created for panel order CBC & Differential.  Procedure                               Abnormality         Status                     ---------                               -----------         ------                     CBC Auto Differential[[id number]]        Abnormal            Final result                 Please view results for these tests on the individual orders.   CBC AND DIFFERENTIAL    Narrative:     The following orders were created for panel order CBC & Differential.  Procedure                               Abnormality         Status                     ---------                               -----------         ------                     CBC Auto Differential[[id number]]        Abnormal             Final result                 Please view results for these tests on the individual orders.   CBC AND DIFFERENTIAL    Narrative:     The following orders were created for panel order CBC & Differential.  Procedure                               Abnormality         Status                     ---------                               -----------         ------                     CBC Auto Differential[[id number]]        Abnormal            Final result                 Please view results for these tests on the individual orders.       Ordered the above labs and independently reviewed the results.      RADIOLOGY  XR Chest 1 View   Final Result      XR Abdomen KUB   Final Result      XR Chest 1 View   Final Result   No focal pulmonary consolidation. Follow-up as clinical   indications persist.       This report was finalized on 6/6/2020 10:35 AM by Dr. Dell Wilson M.D.          CT Head Without Contrast   Final Result   The study is hampered by patient motion but cortical areas   of decreased attenuation suspicious for acute infarcts are identified in   the right parietal region and right parietooccipital region posteriorly.   Remote infarcts involving the periventricular white matter of the left   frontal lobe and a large remote infarct involving the right cerebellar   hemisphere are noted. Further evaluation with MRI examination of the   brain is recommended.        The above information was called to and discussed with VELMA Sheikh.               Radiation dose reduction techniques were utilized, including automated   exposure control and exposure modulation based on body size.       This report was finalized on 6/5/2020 4:22 PM by Dr. Elliot Galdamez M.D.          CT Abdomen Pelvis Without Contrast   Final Result      XR Chest 1 View   Final Result   No focal pulmonary consolidation. Follow-up as clinical   indications persist.       This report was finalized on 6/5/2020 3:09 PM by   Dell Wilson M.D.              I ordered the above noted radiological studies and viewed the images on the PACS system.          MEDICAL RECORD REVIEW    Medical records reviewed in epic show that patient was admitted May 1, 2020 through May 5, 2020 for gram-negative bacteremia, type 2 diabetes, uterine cancer    PROCEDURES    Critical Care  Performed by: Val Baig APRN  Authorized by: Alfonso Johnson MD     Critical care provider statement:     Critical care time (minutes):  40    Critical care time was exclusive of:  Separately billable procedures and treating other patients    Critical care was necessary to treat or prevent imminent or life-threatening deterioration of the following conditions:  Sepsis    Critical care was time spent personally by me on the following activities:  Evaluation of patient's response to treatment, blood draw for specimens, ordering and performing treatments and interventions, development of treatment plan with patient or surrogate, ordering and review of laboratory studies, discussions with consultants, ordering and review of radiographic studies, discussions with primary provider, pulse oximetry, re-evaluation of patient's condition, review of old charts, examination of patient and obtaining history from patient or surrogate            DIFFERENTIAL DIAGNOSIS    Sepsis, acute on chronic renal failure, electrolyte abnormality, pneumonia, UTI      PROGRESS, DATA ANALYSIS, CONSULTS, AND MEDICAL DECISION MAKING    PPE: Patient was placed in face mask in first look. Patient was wearing facemask when I entered the room and throughout our encounter. I wore full protective equipment throughout this patient encounter including a face mask, and gloves. Hand hygiene was performed before donning protective equipment and after removal when leaving the room.    Discussed patient with her daughter, Rylee who states that patient was taken out of [facility]  around May 12 when she started experiencing some delirium and had a urinary tract infection.  Daughter states that she is now living with her.  She states that she was communicating yesterday, woke up this morning moaning out like she does when she is in pain.  Patient did go to dialysis, she is a Monday Wednesday Friday dialysis patient and they also stated that patient only moans out and did not communicate.  Patient's daughter states that she has a history of uterine cancer, her last chemo was at the end of April.    1320: 61/41, 68, patient continues to moan out and is non verbal.  I viewed the patient's chest x-ray in PACS my interpretation is no infiltrate or bony abnormality.  See dictation for official radiology interpretation.      ED Course as of Jun 07 1751 Fri Jun 05, 2020   1456 Reviewed pt's history and workup with Dr. Johnson.  After a bedside evaluation, they agree with the plan of care.  Patient will not be given the full sepsis bolus secondary to her renal failure and being a dialysis patient.  We will give her 500 cc's of normal saline at 125 an hour for her elevated lactic and Procalcitonin          [MS]   1506 Procalcitonin(!): 10.33 [MS]   1506 Lactate(!!): 6.0 [MS]   1512 WBC, UA(!): Too Numerous to Count [MM]   1606 Discussed with Dr. Galdamez patient's CT head results that show a low attenuation in the right parietal lobe, this is suspicious for an acute infarct    [MS]   1637 I spoke with the neurologist, Dr. José Miguel Alfonso concerning the CT scan report.  Agrees this patient is not a TPA candidate.  He will consult and see the patient in the ICU    [MM]      ED Course User Index  [MM] Alfonso Johnson MD  [MS] Val Baig, APRN       Consult Note    Discussed care with Dr Perez  Reviewed patient's history, exam, results and need for admission secondary to Sepsis, UTI, elevated Procal.  Dr. Perez accepts the patient to be admitted to ICU bed.    ADMISSION    Discussed  treatment plan and reason for admission with pt/family and admitting physician.  Pt/family voiced understanding of the plan for admission for further testing/treatment as needed.      DIAGNOSIS  Final diagnoses:   Sepsis with acute renal failure and septic shock, due to unspecified organism, unspecified acute renal failure type (CMS/HCC)   Acute UTI   Elevated procalcitonin   Abnormal head CT       MEDICATIONS GIVEN IN ED    Medications   sodium chloride 0.9 % flush 10 mL (has no administration in time range)   sodium chloride 0.9 % bolus 1,000 mL (1,000 mL Intravenous New Bag 6/5/20 1336)   sodium chloride 0.9 % flush 10 mL (has no administration in time range)           COURSE & MEDICAL DECISION MAKING  Any/All labs and Any/All Imaging studies that were ordered were reviewed and are noted above.  Results were reviewed/discussed with the patient and they were also made aware of online assess.   Pt also made aware that some labs, such as cultures, will not be resulted during ER visit and follow up with PMD is necessary.        Val Baig, APRN  06/07/20 7801

## 2020-06-05 NOTE — PROGRESS NOTES
"Pharmacokinetic Consult - Vancomycin/Meropenem Dosing (Initial Note)    Pharmacy has been consulted to dose vancomycin for Lucía Wise for an indication of sepsis per Dr. Yang Moeller's request. Pre-IHD goal level: 15-25 mcg/mL.    Duration of Therapy: 7 days    Other Antimicrobials: Meropenem (Rx to dose) x 7 days    Relevant clinical data and objective history reviewed:  75 y.o. female 167.6 cm (66\") 79.6 kg (175 lb 7.8 oz)    Vitals:    06/05/20 1840 06/05/20 1845 06/05/20 1900 06/05/20 1915   BP: 91/60 (!) 80/59  (!) 83/55   BP Location:       Pulse: 75 78  65   Resp:       Temp:    97.4 °F (36.3 °C)   TempSrc:    Oral   SpO2:         Creatinine   Date Value Ref Range Status   06/05/2020 2.79 (H) 0.57 - 1.00 mg/dL Final     BUN   Date Value Ref Range Status   06/05/2020 10 8 - 23 mg/dL Final     Dialysis patient (qMWF)    Lab Results   Component Value Date    WBC 13.78 (H) 06/05/2020     Temp Readings from Last 3 Encounters:   06/05/20 97.4 °F (36.3 °C) (Oral)      Baseline culture/source/susceptibility:   6/5: Bcx-pending  6/5: Ucx-pending  6/5: SARS-COV-2 PCR-pending    Assessment/Plan    1. Patient received a vancomycin loading dose of 1750 mg (20 mg/kg) IV once in the ED around 1800. Will order a random level tomorrow morning 6/6 with am labs to determine further dosing (pulse dosing) due to HD status.    2. Patient received a meropenem dose of 1g IV once over 30 minutes in the ED around 1730. Will start meropenem 1g IV q24h extended infusion tomorrow evening based on indication and HD status. Pharmacy will discontinue the Pharmacy to Dose Meropenem consult at this time since patient is on HD.    3. Pharmacy will continue to follow daily while on vancomycin and adjust as needed.     Thank you for allowing me to participate in your patient's care,  Kassie Moss, Pharm.D., [facility]  Clinical Staff Pharmacist  "

## 2020-06-05 NOTE — ED TRIAGE NOTES
Pt picked up by [facility] at [facility] pt was unresposive at dialysis glucose registered low. EMS gave patient 25G of D10, recheck glucose 106, pt can shake head yes or no which is baseline per dialysis center. Pt lives at home with Daughter. Mask placed on patient in first look triage. Triage staff wearing masks.

## 2020-06-05 NOTE — ED PROVIDER NOTES
I supervised care provided by the midlevel provider.   We have discussed this patient's history, physical exam, and treatment plan.  I have reviewed the note and personally saw and examined the patient and agree with the plan of care.   I have talked with Jackelyn the midlevel at length.  I have seen and evaluated this patient.  Is important to note old records and a recent admission for gram-negative sepsis.  Please see discharge summary and diagnosis below from admission in May 2020.  Paper presents here with episodes of decreased responsiveness and low glucose at dialysis today.  Patient completed dialysis.  Patient otherwise is nonverbal and unable to provide any history.    GENERAL: Ill-appearing black female.  Appears chronically ill as well as acutely ill.  HENT: nares patent  Head/neck/ face are symmetric without gross deformity or swelling.  She is moving her head back and forth to the left.  EYES: no scleral icterus  CV: regular rhythm, regular rate with intact distal pulses with soft systolic murmur 2 out of 6  RESPIRATORY: normal effort and no respiratory distress.  Her lungs anterior seem to be clear on auscultation.  She will not take deep breaths for me and she would not follow commands.  Patient has a dialysis catheter in her right chest.  No obvious signs of infection it is clean dry and intact.  ABDOMEN: soft and non-tender and obese.  She has superficial scaling and superficial ulcerations to her lower portion of her abdomen pelvis very likely from a contact dermatitis.  There is baby powder that is been applied to these areas.  MUSCULOSKELETAL: no deformity  NEURO: , moves all extremities, he will moan.  But she will not follow commands will not say any words.  SKIN: warm, dry    Vital signs and nursing notes reviewed.    Plan I reviewed lab work.  I am concerned this patient is has severe sepsis again.  Currently her blood pressure is in the upper 90s systolic.  Her heart rate is normal.  O2 sat  was in the upper 90s.  We will go ahead and need to admit this patient and start her on some IV antibiotics.  Her urinalysis on this cath urine looks like it is the culprit of a potential infection.  Last time she had an ESBL from her urine.    I spoke with Rylee her daughter informed her of her serious condition.  Informed her of the initial results of lab test.  CT scans are not back as of yet.  I explained to her initial treatment plan with broad-spectrum IV antibiotics.  At this time Rylee wants her mother to be a full code and want CPR or intubation if it became necessary.  Patient has stage IV cancer with last chemotherapy in April.  She has a very poor and guarded prognosis.    We are currently under a pandemic from the COVID19 infection.  The patient presented to the emergency department by ambulance or personal vehicle.  During current hospital restrictions no other visitors were present in the emergency department during my evaluation and treatment. I followed the current protocols required by Infection Control at [facility] in my evaluation and treatment of the patient. The patient was wearing a face mask during my evaluation and throughout my encounter. During my whole encounter with this patient I used appropriate personal protective equipment.  This equipment consisted of eye protection, facemask, gown, and gloves.  I applied this equipment before entering the room.      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         Hospital Course      Ms. Wise is a 74 y.o. female with a history of end-stage renal disease and diabetes who presented to [facility] initially complaining of abnormal labs.  Please see the admitting history and physical for further details.  She was found to have bacteremia on blood cultures drawn at dialysis and was admitted to the hospital for further evaluation and treatment.  She was admitted to the hospital infectious disease and nephrology were consulted.  Repeat blood cultures were drawn.  She was started on IV Zosyn initially and completed 3 days of that therapy here in the hospital.  After sensitivities were obtained on her outpatient cultures it was determined she had ESBL.  It was sensitive to Zosyn.  She was then transitioned from IV Zosyn to ertapenem with plans to complete 5 more days of therapy at discharge.  She has had some mild diarrhea but otherwise no other symptoms and this is resolved this morning.  She is been cleared for discharge by oncology and infectious disease with plans to follow-up in the outpatient setting with her oncologist at [place].  She will need to follow-up with her nephrologist as directed.  The plan is been discussed with the patient at the bedside at this point she remains stable for discharge.  Of note at the time of discharge her white blood cell count is curiously trending up.  Discussed this with infectious disease and they feel it is probably related to her chemotherapy or underlying malignancy.  There does not seem to be an infectious source for this.  She is not been having fevers and clinically has shown improvement.          Alfonso Johnson MD  06/05/20 1910

## 2020-06-05 NOTE — H&P
"  .     Admission Note    Patient Identification:  Lucía Wise  75 y.o.  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  Evaluation in the ER revealed a white count of 13 with left shift as well as a CT scan of the head showing new acute strokes as well as chronic.  Patient was noted to be borderline hypotensive and admitted to the ICU for this reason as well as close neurology monitoring.  She repeatedly turns head, moans \"no\" to most questions however questionable if this is at all reliable or meaningful.  She is ill appearing and unable to give reliable history.    Past Medical History:   Diagnosis Date   • Acute kidney failure (CMS/HCC)    • Acute renal failure on dialysis (CMS/HCC)    • Anemia    • Anxiety    • Cancer (CMS/HCC)     perotineal cancer   • Depression    • Diabetes (CMS/HCC)    • DVT (deep venous thrombosis) (CMS/HCC)    • ESRD (end stage renal disease) (CMS/HCC)    • Family history of peritoneal cancer    • GERD (gastroesophageal reflux disease)    • History of transfusion    • Hyperparathyroidism (CMS/HCC)    • Hypertension    • Kidney stones    • Maintenance chemotherapy     last chemo was april 28/2020 (2nd dose) every 3 weeks for 6 doses   • Protein-calorie malnutrition (CMS/HCC)    • Pulmonary embolism (CMS/HCC)     Acute   • Uremia    • Vitamin D deficiency    • Weakness generalized        No past surgical history on file.     Social History     Socioeconomic History   • Marital status: Single     Spouse name: Not on file   • Number of children: Not on file   • Years " of education: Not on file   • Highest education level: Not on file   Tobacco Use   • Smoking status: Former Smoker     Types: Cigarettes   Substance and Sexual Activity   • Alcohol use: Never     Frequency: Never     Comment: not seen from other facility records   • Drug use: Never     Comment: not seen from other facility records   • Sexual activity: Defer       No family history on file.      (Not in a hospital admission)    Allergies   Allergen Reactions   • Ibuprofen Other (See Comments)     Kidney failure   • Aspirin Unknown - Low Severity       Review of Systems:  Unable to obtain accurate ROS as patient is unable to answer questions due to altered mental status.  Physical Exam:  Vitals:  Vitals:    06/05/20 1322 06/05/20 1345 06/05/20 1356 06/05/20 1618   BP: (!) 99/38      BP Location: Right arm      Patient Position: Lying      Pulse: 82 69     Resp:  18     Temp:  96.9 °F (36.1 °C)     TempSrc:  Rectal     SpO2:   100%    Weight:    85 kg (187 lb 6.3 oz)           Body mass index is 31.18 kg/m².    Intake/Output Summary (Last 24 hours) at 6/5/2020 1703  Last data filed at 6/5/2020 1510  Gross per 24 hour   Intake 1000 ml   Output --   Net 1000 ml       Exam:  General appearance: ill appearing not conversant   Eyes: anicteric sclerae, moist conjunctivae; no lid-lag; PERRLA  HENT: Atraumatic; oropharynx clear with moist mucous membranes and no mucosal ulcerations; normal hard and soft palate  Neck: Trachea midline; FROM, supple, no thyromegaly or lymphadenopathy  Lungs: bilateral air entry, no wheeze, unlabored resp effort, symmetric air entry ? Crackles at bases, with normal respiratory effort and no intercostal retractions  CV: rrr, no MRGs   Abdomen: +obese, hard mass palpable, slight distension  Extremities: trace peripheral edema or extremity lymphadenopathy  Psych: not appropriate affect, not alert and oriented to person, place and time    Scheduled meds:    meropenem 1 g Intravenous Once   vancomycin  20 mg/kg Intravenous Once       Data Review:   I reviewed the patient's medications and new clinical results.  Lab Results   Component Value Date    CALCIUM 7.1 (L) 2020     Results from last 7 days   Lab Units 20  1345   AST (SGOT) U/L 62*   SODIUM mmol/L 138   POTASSIUM mmol/L 4.4   CHLORIDE mmol/L 101   CO2 mmol/L 21.2*   BUN mg/dL 10   CREATININE mg/dL 2.79*   GLUCOSE mg/dL 95   CALCIUM mg/dL 7.1*   WBC 10*3/mm3 13.78*   HEMOGLOBIN g/dL 8.4*   PLATELETS 10*3/mm3 42*   ALT (SGPT) U/L 41*             Estimated Creatinine Clearance: 18.8 mL/min (A) (by C-G formula based on SCr of 2.79 mg/dL (H)).    IMAGING:  I have reviewed all imaging studies since my last documentation.  Imaging Results (Most Recent)     Procedure Component Value Units Date/Time    CT Abdomen Pelvis Without Contrast [[id number]] Collected:  20     Updated:  20    Narrative:       CT ABDOMEN AND PELVIS WITHOUT CONTRAST     HISTORY: On dialysis for renal failure. Sepsis. History of peritoneal  carcinomatosis.     TECHNIQUE/FINDINGS: The CT scan was performed as an emergency procedure  through the abdomen and pelvis without contrast and demonstrates the  followin. There are no prior studies for comparison. There is a small left  pleural effusion layering posteriorly and there are several small areas  of groundglass opacity scattered at the right lung base. The findings  are nonspecific but could relate to atypical infection and includes the  possibility of COVID-19 pneumonia. Further clinical correlation is  recommended.  2. There is diffuse fatty infiltration of the liver. The spleen,  pancreas, and both adrenal glands are unremarkable. There is a cyst  involving the upper pole of the left kidney measuring 3.1 cm. There is  no evidence of renal obstruction on either side.  3. The gallbladder is not visualized and may have been removed. There is  very slight aneurysmal enlargement of the infrarenal aorta  measuring 2.3  cm. There is no retroperitoneal lymphadenopathy. A vena cava filter is  present.  4. The large and small bowel loops are normal in caliber. No  inflammatory change is seen. No abnormality is seen in the pelvis. There  is no CT evidence of carcinomatosis.                 Radiation dose reduction techniques were utilized, including automated  exposure control and exposure modulation based on body size.          CT Head Without Contrast [[id number]] Collected:  06/05/20 1606     Updated:  06/05/20 1628    Narrative:       CT HEAD WITHOUT CONTRAST     HISTORY: Nonverbal, altered mental status.     COMPARISON: None.     FINDINGS: The study is hampered by patient motion. There is no evidence  of hemorrhage. Moderate vascular calcification is noted. Areas of  decreased attenuation involving the white matter of the cerebral  hemispheres are noted bilaterally suggesting moderate-to-severe small  vessel ischemic disease. There is a remote infarct involving the medial  aspect of the  cerebellar hemisphere measuring approximately 4 cm in  size. There is moderate-to-severe small vessel ischemic disease. There  is volume loss with secondary enlargement of the left frontal horn.     There is an area of suspicious attenuation involving the right parietal  lobe posteriorly measuring approximately 2.3 cm in size with an  additional area of decreased attenuation involving the cortex of the  right parietal lobe superolaterally measuring 3 cm in size. These areas  are suspicious for areas of acute infarction.     There is no evidence of hemorrhage.       Impression:       The study is hampered by patient motion but cortical areas  of decreased attenuation suspicious for acute infarcts are identified in  the right parietal region and right parietooccipital region posteriorly.  Remote infarcts involving the periventricular white matter of the left  frontal lobe and a large remote infarct involving the right  cerebellar  hemisphere are noted. Further evaluation with MRI examination of the  brain is recommended.      The above information was called to and discussed with VELMA Sheikh.           Radiation dose reduction techniques were utilized, including automated  exposure control and exposure modulation based on body size.     This report was finalized on 6/5/2020 4:22 PM by Dr. Elliot Galdamez M.D.       XR Chest 1 View [[id number]] Collected:  06/05/20 1508     Updated:  06/05/20 1512    Narrative:       XR CHEST 1 VW-     HISTORY: Female who is 75 years-old,  hypoglycemia     TECHNIQUE: Frontal view of the chest     COMPARISON: 05/03/2020     FINDINGS: Stable appearing right-sided dual-lumen central venous  catheter in the left chest port. Heart, mediastinum and pulmonary  vasculature are unremarkable. No focal pulmonary consolidation, pleural  effusion, or pneumothorax. No acute osseous process.       Impression:       No focal pulmonary consolidation. Follow-up as clinical  indications persist.     This report was finalized on 6/5/2020 3:09 PM by Dr. Dell Wilson M.D.             ASSESSMENT /   PLAN:  Acute Stroke  Stage IV Uterine cancer with peritoneal carcinoma  Sepsis  ESRD on HD  DMII  HTN  COPD  History of DVT PE  AS  H/o recent GNB sepsis    Cont meropenem, consult ID who was following her last admission.  Reviwed prior admission.  Stat Neuro stroke consult - ER has discussed, verified with Dr Johnson due to abnormal CT head suspicious for new acute strokes as well as old prior.  CT abd given her peritoneal carcinoma, unreliable history, and unclear infectious source  Trend lactate.  Follow microbiology  Rule out covid - pending  IV bolus  Phenylephrine if needed  Consult to nephrology    Admit to ICU      Total critical care time was 50 minutes, excluding any separately billable procedure time.    Yang Moeller MD  [facility]  06/05/20  708o

## 2020-06-06 NOTE — CONSULTS
Referring Provider: Anthony Perez MD  [id number]  [address] [address]  Reason for Consultation: Sepsis    Subjective   History of present illness: This is a 75-year-old female with peritoneal carcinoma diabetes, end-stage renal disease on hemodialysis and hypertension who was admitted on June 5 after passing out at her dialysis.  The patient was last admitted to [facility] at the beginning of May with ESBL E. coli she was treated with ertapenem and discharged on May 5.  She was admitted to an outside hospital from May 12 through the 29th with confusion and leukocytosis.  She was empirically treated with Zosyn.  Blood cultures were negative.  The patient did not produce urine and therefore urine cultures were not obtained.  She continued to be confused during that hospital admission which was thought to be due to metabolic encephalopathy in the setting of end-stage renal disease and various medications.  On EMS arrival she was found to be hypoglycemic.  Admission labs revealed a white blood cell count of 13,000 with a procalcitonin of 10.3.  Chest x-ray and CAT scan of the abdomen pelvis did not show any evidence of infection.  CT of the head was concerning for new infarcts.  She was hypotensive and admitted to the intensive care unit.  Given concern for sepsis she was empirically started on meropenem and vancomycin.  Currently she is maxed out on Levophed.  She remains confused.  No diarrhea.  No rashes.  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     reports that she has quit smoking. Her smoking use included cigarettes. She does not have any smokeless tobacco history on file. She reports that she does not drink alcohol or use drugs.    family history is not on file.    Allergies   Allergen Reactions   • Ibuprofen Other (See Comments)     Kidney failure   • Aspirin Unknown - Low Severity       Medication:  Antibiotics:  Meropenem 1 g IV every 24 hours  Vancomycin dosing per pharmacy    Please refer to the medical record for a full medication list    Review of Systems  Review of systems could not be obtained due to   patient confusion.    Objective   Vital Signs   Temp:  [96.9 °F (36.1 °C)-98.2 °F (36.8 °C)] 97.5 °F (36.4 °C)  Heart Rate:  [63-82] 66  Resp:  [18] 18  BP: ()/() 84/66  100% on 2 L nasal cannula    Physical Exam:   General: In no acute distress  HEENT: Normocephalic, atraumatic, PERRL, no scleral icterus. Oropharynx unable to assess  Neck: Supple, trachea is midline  Cardiovascular: Normal rate, regular rhythm, karen S1 and S2, no murmurs, rubs, or gallops     Respiratory: Crackles at the bases otherwise clear to auscultation   GI: Abdomen is soft, non-tender, non-distended, positive bowel sounds bilaterally, wounds underneath the pannus without erythema or purulence  Musculoskeletal:  no edema, tenderness or deformity  Skin: No rashes left subclavian port without erythema or purulence, right subclavian central line without erythema or purulence  Extremities: no C/C, + LE edema   Neurological: Moving all 4 extremities, awake, able to answer simple yes or no questions but not consistently  Psychiatric: Normal mood and affect     Results Review:   I reviewed the patient's new clinical results.  I reviewed the patient's new imaging results and agree with the interpretation.    Lab Results   Component Value Date    WBC 15.04 (H) 06/06/2020    HGB 8.4 (L)  06/06/2020    HCT 25.3 (L) 06/06/2020    MCV 87.8 06/06/2020    PLT 42 (C) 06/06/2020       Lab Results   Component Value Date    GLUCOSE 142 (H) 06/06/2020    BUN 10 06/06/2020    CREATININE 2.69 (H) 06/06/2020    EGFRIFNONA  05/03/2020      Comment:      <15 Indicative of kidney failure.    EGFRIFAFRI 21 (L) 06/06/2020    BCR 3.7 (L) 06/06/2020    CO2 17.7 (L) 06/06/2020    CALCIUM 7.2 (L) 06/06/2020    ALBUMIN 1.50 (L) 06/06/2020    LABIL2 0.7 (L) 05/28/2020    AST 62 (H) 06/05/2020    ALT 41 (H) 06/05/2020     Procalcitonin 10.33  Lactate 6.7    Urinalysis large blood large leukocytes positive protein too numerous to count white blood cells    Microbiology:  6/5 BC xP x 2  6/5 COVID neg  6/5 UCx P    Radiology:  Admission chest x-ray personally reviewed by me shows no evidence of infiltrate pleural effusion pneumothorax    CT of the abdomen pelvis shows a small left-sided pleural effusion.  Groundglass opacities in the right lung base.  Fatty infiltration of the liver.  Slight aneurysmal enlargement of the infrarenal aorta normal bowels    CT of the head concerning for possible acute infarcts in the right parietal region and right parietal occipital region.  Remote infarcts in the left frontal lobe and a large remote infarct in the right cerebellar hemisphere    Assessment/Plan   Acute stroke  Leukocytosis  Altered mental status  Elevated procalcitonin  Peritoneal cancer on chemotherapy complicating above  Hypertension  End-stage renal disease on hemodialysis complicating above  Diabetes complicating above  Shock, presumed septic    The patient is in shock presumably septic.  CAT scan of the abdomen pelvis did not reveal any evidence of infection.  Chest x-ray without evidence of pneumonia.  Follow-up blood and urine cultures.  Continue empiric vancomycin dosing per pharmacy and meropenem 1 g IV every 24 hours.    I discussed the patients findings and my recommendations with nursing staff

## 2020-06-06 NOTE — PROGRESS NOTES
"Pharmacokinetic Evaluation - Vancomycin    Lucía Wise is a 75 y.o. female on vancomycin pharmacy to dose.  MRN: [id number]  : 1945    Day of vancomycin therapy:   Indication: sepsis  Consulted by: Dr. NAN Moeller, ID has been consulted    Goal pre-IHD level: 20-25 mcg/ml  Current dose: intermittent   Other antimicrobials: meropenem    Blood pressure (!) 79/66, pulse 70, temperature 98.2 °F (36.8 °C), temperature source Oral, resp. rate 18, height 167.6 cm (66\"), weight 79.6 kg (175 lb 7.8 oz), SpO2 99 %.  Results from last 7 days   Lab Units 20  0520  1345   CREATININE mg/dL 2.69* 2.79*     Estimated Creatinine Clearance: 19.2 mL/min (A) (by C-G formula based on SCr of 2.69 mg/dL (H)).  Results from last 7 days   Lab Units 20  0520  0115 20  1345   WBC 10*3/mm3 15.04*  --   --  13.78*   HEMOGLOBIN g/dL 8.4*  --   --  8.4*   HEMATOCRIT % 25.3*  --   --  25.1*   PLATELETS 10*3/mm3 42*  --   --  42*   LACTATE mmol/L  --  6.7* 6.7* 6.0*       Procal:  10.3         Cultures:      Microbiology Results (last 10 days)     Procedure Component Value - Date/Time    [facility] SWAB IN TRANSPORT MEDIA 8-12 HR TAT - Swab, Nasopharynx [[id number]]  (Normal) Collected:  20    Lab Status:  Final result Specimen:  Swab from Nasopharynx Updated:  20     COVID19 Not Detected            Dosing hx (include troughs if drawn):  1817 1750 mg.  HD was skipped  522 random=22.7 mcg/ml    Assessment:  Has a history of recent esbl e coli infection and ID has been consulted and will continue meropenem/vanc for now.  Currently on phenylephrine drip for hypotension.    Random vanc level is 22.7 mcg/ml this am. Per nephrology note, no plans for HD today and will reassess tomorrow am for HD or CRRT.  Will recheck vancomycin level tomorrow am.      Plan:  1)  No vanc dose needed today.  2) random vanc level tomorrow am.  3) Encourage " adequate hydration if appropriate. Monitor for decreased UOP, rash or other signs of vancomycin intolerance.    Thanks for this consult, will follow until Edvin villaseñor.D, BCCCP

## 2020-06-06 NOTE — PROGRESS NOTES
Anthony Perez MD                          [phone]      Patient ID:    Name:  Lucía Wise    MRN:  [id number]    1945   75 y.o.  female            Patient Care Team:  Lorelei Campos DO as PCP - General (Internal Medicine)    CC/ Reason for visit: Shock, acute encephalopathy, possible subacute stroke    Subjective: Pt seen and examined this AM.  Acute overnight events noted.  Work-up done so far and recommendations from consultants noted.  Patient unfortunately continuing to need lea-at max dose.  Given IV fluid boluses with some response.  Clinically appears to be dehydrated.  CT of the abdomen does not show any obvious source of infection.  There is no significant ascites.  CT of the head is concerning for subacute stroke on MRI is ordered but pending.    ROS: Unable to obtain due to patient's encephalopathy.    Objective     Vital Signs past 24hrs    BP range: BP: ()/() 106/89  Pulse range: Heart Rate:  [63-85] 67  Resp rate range:    Temp range: Temp (24hrs), Av.7 °F (36.5 °C), Min:97.4 °F (36.3 °C), Max:98.2 °F (36.8 °C)      Ventilator/Non-Invasive Ventilation Settings (From admission, onward)    None          Device (Oxygen Therapy): room air       79.6 kg (175 lb 7.8 oz); Body mass index is 28.32 kg/m².      Intake/Output Summary (Last 24 hours) at 2020 1430  Last data filed at 2020 0355  Gross per 24 hour   Intake 3275 ml   Output --   Net 3275 ml       PHYSICAL EXAM   Constitutional: Middle aged obese -American female pt in bed, No acute respiratory distress, + accessory muscle use  Head: - NCAT  Eyes: No pallor.  Anicteric sclerae, EOMI.  ENMT:  Mallampati 4, no oral thrush. Dry MM.   NECK: Trachea midline, No thyromegaly, no palpable cervical lymphadenopathy  Heart: RRR, tachycardic no murmur. No pedal edema   Lungs: VIGNESH +, decreased breath sounds at the base no wheezes/ crackles heard    Abdomen: Soft.  Obese, no  tenderness, guarding or rigidity. No palpable masses  Extremities: Extremities warm and well perfused. No cyanosis/ clubbing  Neuro: Conscious, generally weak, confused, no gross focal neuro deficits  Psych: Mood and affect unable to obtain    Scheduled meds:    atorvastatin 80 mg Oral Nightly   clopidogrel 75 mg Oral Daily   meropenem 1 g Intravenous Q24H   pantoprazole 40 mg Intravenous Q24H   sodium chloride 10 mL Intravenous Q12H   sodium chloride 10 mL Intravenous Q12H   Vancomycin Pharmacy Intermittent Dosing  Does not apply Daily       IV meds:                        norepinephrine 0.02-0.3 mcg/kg/min    Pharmacy to Dose meropenem (MERREM)     Pharmacy to dose vancomycin     phenylephrine 0.5-3 mcg/kg/min Last Rate: 2.5 mcg/kg/min (06/06/20 1426)       Data Review:      Results from last 7 days   Lab Units 06/06/20  0522 06/05/20  1345   SODIUM mmol/L 139 138   POTASSIUM mmol/L 3.9 4.4   CHLORIDE mmol/L 103 101   CO2 mmol/L 17.7* 21.2*   BUN mg/dL 10 10   CREATININE mg/dL 2.69* 2.79*   CALCIUM mg/dL 7.2* 7.1*   BILIRUBIN mg/dL  --  0.8   ALK PHOS U/L  --  213*   ALT (SGPT) U/L  --  41*   AST (SGOT) U/L  --  62*   GLUCOSE mg/dL 142* 95   WBC 10*3/mm3 15.04* 13.78*   HEMOGLOBIN g/dL 8.4* 8.4*   PLATELETS 10*3/mm3 42* 42*   PROCALCITONIN ng/mL  --  10.33*       Lab Results   Component Value Date    CALCIUM 7.2 (L) 06/06/2020    PHOS 2.5 06/06/2020       Results from last 7 days   Lab Units 06/05/20  1412   URINECX  Yeast isolated*       Results from last 7 days   Lab Units 06/06/20  0740   PH, ARTERIAL pH units 7.385   PO2 ART mm Hg 81.3   PCO2, ARTERIAL mm Hg 29.9*   HCO3 ART mmol/L 17.9*        Results Review:    I have reviewed the relevant laboratory results and independently reviewed the chest imaging from this hospitalization including the available echocardiogram reports personally and summarized it if/ when appropriate below    Assessment    Acute encephalopathy  Acute/subacute Rt parietal CVA  Shock -  Possible septic   Stage IV peritoneal carcinoma with mediastinal- Dr. Fitzpatrick at [facility], s/p cycle 2 of chemotherapy Taxol, carboplatinum as of 4/29/2020.  ESRD on HD  Chronic diastolic dysfunction  Recent DVT/PE 2/20 s/p IVC filter on coumadin( per [facility] dc summary)   Recent ESBL bacteremia  H/o CVA  H/o HIT  Hyperparathyroidism  Nephrolithiasis  Protein calorie malnutrition  Morbid obesity  Diabetes    PLAN:  Work-up done so far reviewed and several consultant documentations reviewed personally.  Patient remains persistently hypotensive requiring significant amount of vasopressor support and appears clinically dehydrated.  Will start IV fluid hydration and continue with vasopressor support to keep maps greater than 65.  Some concern for aortic stenosis noted and evaluate echocardiogram.  Appreciate neurology input regarding concern for subacute stroke and possible embolic phenomenon.  Echocardiogram is pending.  Plavix and statin for secondary stroke prophylaxis  Noted MRI and MRA ordered which will be done when patient has some improvement with regards to her septic shock.  Appreciate ID input and agree with current empiric antibiotics.  Blood cultures are pending and will need to rule out line sepsis.  Noted previous blood culture showing ESBL bacteremia and not sure if dialysis line or port have been replaced.  Appreciate nephrology input and agree with holding dialysis for now given no urgent indication.  Patient takes midodrine at home and we will consider restarting along with tube feeds.  History of DVT/PE but not on anticoagulation( per med rec while was dced on coumadin per [facility] dc summary 5/29) and patient is a high risk for the same given poor mobility and metastatic cancer.  Continue DVT prophylaxis for now.     Very guarded prognosis given advanced cancer along with multiple new issues and prior complications.    CC - 80 mins due to need for extensive review of records from [facility] as well as reviewing all the work-up done so far including imaging personally    I have discussed my findings and recommendations with patient and nursing staff.     Anthony Perez MD  6/6/2020

## 2020-06-06 NOTE — THERAPY EVALUATION
Acute Care - Speech Language Pathology   Swallow Initial Evaluation [facility]     Patient Name: Lucía Wise  : 1945  MRN: [id number]  Today's Date: 2020               Admit Date: 2020    Visit Dx:     ICD-10-CM ICD-9-CM   1. Sepsis with acute renal failure and septic shock, due to unspecified organism, unspecified acute renal failure type (CMS/HCC) A41.9 038.9    R65.21 995.92    N17.9 785.52     584.9   2. Acute UTI N39.0 599.0   3. Elevated procalcitonin R79.89 790.99   4. Abnormal head CT R93.0 793.0     Patient Active Problem List   Diagnosis   • Gram-negative bacteremia   • Type 2 diabetes mellitus with chronic kidney disease on chronic dialysis, with long-term current use of insulin (CMS/HCC)   • Primary serous carcinoma of uterine adnexa (CMS/HCC)   • Stage IVB serous primary peritoneal carcinoma   • Hypertension   • ESRD on dialysis (CMS/HCC)   • COPD (chronic obstructive pulmonary disease) (CMS/HCC)   • Personal history of DVT/PE   • Chemotherapy-induced thrombocytopenia   • Aortic stenosis   • Sepsis with acute renal failure and septic shock (CMS/HCC)     Past Medical History:   Diagnosis Date   • Acute kidney failure (CMS/HCC)    • Acute renal failure on dialysis (CMS/HCC)    • Anemia    • Anxiety    • Cancer (CMS/HCC)     perotineal cancer   • Depression    • Diabetes (CMS/HCC)    • DVT (deep venous thrombosis) (CMS/HCC)    • ESRD (end stage renal disease) (CMS/HCC)    • Family history of peritoneal cancer    • GERD (gastroesophageal reflux disease)    • History of transfusion    • Hyperparathyroidism (CMS/HCC)    • Hypertension    • Kidney stones    • Maintenance chemotherapy     last chemo was  (2nd dose) every 3 weeks for 6 doses   • Protein-calorie malnutrition (CMS/HCC)    • Pulmonary embolism (CMS/HCC)     Acute   • Uremia    • Vitamin D deficiency    • Weakness generalized      History reviewed. No pertinent surgical history.     SWALLOW EVALUATION (last 72 hours)       SLP Adult Swallow Evaluation     Row Name 06/06/20 1130          Document Type  evaluation  -ML    Subjective Information  pain  -ML    Patient Observations  poorly cooperative confused  -ML    Patient Effort  poor  -ML    Comment  Pt confused and only oriented to name, not responding to question most of the time, turning head back and forth rarely making eye contact and moaning/grunting throughout  -ML    Symptoms Noted During/After Treatment  none  -ML          Patient Profile Reviewed  yes  -ML    Pertinent History Of Current Problem  admitted after patient was noted to be hypoglycemic and nonverbal after dialysis session this morning. CTH revealed multiple old and suspected subacute strokes in R parietal and parioccipital regions.   -ML    Current Method of Nutrition  NPO  -ML    Precautions/Limitations, Vision  -- unknown, pt unable to report.   -ML    Precautions/Limitations, Hearing  -- unknown, pt unable to report.   -ML    Prior Level of Function-Communication  -- unknown, pt unable to report.   -ML    Prior Level of Function-Swallowing  -- unknown, pt unable to report.   -ML    Plans/Goals Discussed with  patient  -ML    Barriers to Rehab  medically complex  -ML    Patient's Goals for Discharge  patient could not state  -ML          Additional Documentation  -- pt reported pain in her stomach when raising HOB  -ML          Dentition Assessment  natural, present and adequate;missing teeth  -ML    Secretion Management  -- white secretions noted on right labial sill upon entering   -ML    Mucosal Quality  moist, healthy  -ML          Oral Motor General Assessment  generalized oral motor weakness  -ML          Respiratory Support Currently in Use  room air  -ML    Eating/Swallowing Skills  fed by SLP  -ML    Positioning During Eating  upright in bed  -ML    Utensils Used  spoon;cup;straw  -ML    Consistencies Trialed  pureed;thin liquids ice, pt refused all solids except 2 puree trials  -ML          Oral  Prep Phase  impaired  -ML    Oral Transit  impaired  -ML    Pharyngeal Phase  suspected pharyngeal impairment  -ML    Clinical Swallow Evaluation Summary  Clinical swallowing evaluation completed, however, limited due to pt refusing most p.o. trials. Oral phase characterized by oral holding of liquids and solids and slow oral transit. Also suspect delayed swallow initiation. During trials of thin liquids, pt appeared to hold breath after swallow and grunt when exhaling inconsistently, possible concern for airway closure/penetration/aspiration. Pt only agreeable to 2 bites of puree ( 1 applesauce, 1 pudding) with much encouragement. Multiple swallows noted and grimacing. Pt expectorated last trial of pudding and made no attempt to swallow. Pt refused all other trials of solids. Also noted during last trial of thin liquids, pt appeared to have difficulty drinking from straw but was eventually able to bring liquids into her mouth. Pt unable to adequately drink from cup. Pt's altered mental status and oropharyngeal deficits place pt at high risk of aspiration. Recommend NPO with non-oral meds at this time. Pt can have ice chips and sips of water after oral care.   -ML          SLP Swallowing Diagnosis  moderate;oral dysfunction;suspected pharyngeal dysfunction  -ML    Functional Impact  risk of aspiration/pneumonia  -ML    Rehab Potential/Prognosis, Swallowing  good, to achieve stated therapy goals  -ML    Swallow Criteria for Skilled Therapeutic Interventions Met  demonstrates skilled criteria  -ML          Therapy Frequency (Swallow)  PRN  -ML    Predicted Duration Therapy Intervention (Days)  until discharge  -ML    SLP Diet Recommendation  NPO;ice chips between meals after oral care, with supervision;water between meals after oral care, with supervision  -ML    Recommended Diagnostics  reassess via clinical swallow evaluation  -ML    SLP Rec. for Method of Medication Administration  meds via alternate route  -ML     Monitor for Signs of Aspiration  notify SLP if any concerns  -ML    Anticipated Dischage Disposition (SLP)  unknown  -ML          Oral Nutrition/Hydration Goal Selection (SLP)  oral nutrition/hydration, SLP goal 1  -ML          Oral Nutrition/Hydration Goal 1, SLP  Pt will safely swallow least restrictive diet without overt s/s of penetration/aspiration  -ML    Time Frame (Oral Nutrition/Hydration Goal 1, SLP)  by discharge  -ML      User Key  (r) = Recorded By, (t) = Taken By, (c) = Cosigned By    Initials Name Effective Dates    Jessie Lorenzo MS CCC-SLP 10/04/18 -           EDUCATION  The patient has been educated in the following areas:   Dysphagia (Swallowing Impairment).    SLP Recommendation and Plan  SLP Swallowing Diagnosis: moderate, oral dysfunction, suspected pharyngeal dysfunction  SLP Diet Recommendation: NPO, ice chips between meals after oral care, with supervision, water between meals after oral care, with supervision     SLP Rec. for Method of Medication Administration: meds via alternate route     Monitor for Signs of Aspiration: notify SLP if any concerns  Recommended Diagnostics: reassess via clinical swallow evaluation  Swallow Criteria for Skilled Therapeutic Interventions Met: demonstrates skilled criteria  Anticipated Dischage Disposition (SLP): unknown  Rehab Potential/Prognosis, Swallowing: good, to achieve stated therapy goals  Therapy Frequency (Swallow): PRN  Predicted Duration Therapy Intervention (Days): until discharge            SLP GOALS     Row Name 06/06/20 1130             Oral Nutrition/Hydration Goal 1 (SLP)    Oral Nutrition/Hydration Goal 1, SLP  Pt will safely swallow least restrictive diet without overt s/s of penetration/aspiration  -ML      Time Frame (Oral Nutrition/Hydration Goal 1, SLP)  by discharge  -ML        User Key  (r) = Recorded By, (t) = Taken By, (c) = Cosigned By    Initials Name Provider Type    Jessie Lorenzo MS CCC-SLP Speech and  Language Pathologist           SLP Outcome Measures (last 72 hours)      SLP Outcome Measures     Row Name 06/06/20 1200             SLP Outcome Measures    Outcome Measure Used?  Adult NOMS  -ML         Adult FCM Scores    FCM Chosen  Swallowing  -ML      Swallowing FCM Score  1  -ML        User Key  (r) = Recorded By, (t) = Taken By, (c) = Cosigned By    Initials Name Effective Dates    Jessie Lorenzo MS CCC-SLP 10/04/18 -            Time Calculation:   Time Calculation- SLP     Row Name 06/06/20 1241             Time Calculation- SLP    SLP Start Time  1130  -ML      SLP Received On  06/06/20  -ML        User Key  (r) = Recorded By, (t) = Taken By, (c) = Cosigned By    Initials Name Provider Type    Jessie Lorenzo MS CCC-SLP Speech and Language Pathologist          Therapy Charges for Today     Code Description Service Date Service Provider Modifiers Qty    [id number] HC ST EVAL ORAL PHARYNG SWALLOW 4 6/6/2020 Jessie Tay MS CCC-SLP GN 1               Jessie Tay MS CCC-RENU  6/6/2020

## 2020-06-06 NOTE — CONSULTS
Neurology Note    Patient:  Lucía Wise    YOB: 1945    REFERRING PHYSICIAN:  Anthony Perez,*    CHIEF COMPLAINT:    AMS    HISTORY OF PRESENT ILLNESS:   The patient is a 75 y.o. female with ESRD, stage IV uterine carcinoma, DM, HTN, DVT, PE, stroke, recent admission for sepsis, developed hypoglycemia and AMS after HD today, presented to ED today where BP was 99/38, T96.9, BS 93, WBC 13K, patient nonverbal and moaning, CTH wit chronic changes, suspected recent strokes in right parietal and paritooccipital regions. She is admitted to the ICU.      Past Medical History:  Past Medical History:   Diagnosis Date   • Acute kidney failure (CMS/HCC)    • Acute renal failure on dialysis (CMS/HCC)    • Anemia    • Anxiety    • Cancer (CMS/HCC)     perotineal cancer   • Depression    • Diabetes (CMS/HCC)    • DVT (deep venous thrombosis) (CMS/HCC)    • ESRD (end stage renal disease) (CMS/HCC)    • Family history of peritoneal cancer    • GERD (gastroesophageal reflux disease)    • History of transfusion    • Hyperparathyroidism (CMS/HCC)    • Hypertension    • Kidney stones    • Maintenance chemotherapy     last chemo was april 28/2020 (2nd dose) every 3 weeks for 6 doses   • Protein-calorie malnutrition (CMS/HCC)    • Pulmonary embolism (CMS/HCC)     Acute   • Uremia    • Vitamin D deficiency    • Weakness generalized        Past Surgical History:  No past surgical history on file.    Social History:   Social History     Socioeconomic History   • Marital status: Single     Spouse name: Not on file   • Number of children: Not on file   • Years of education: Not on file   • Highest education level: Not on file   Tobacco Use   • Smoking status: Former Smoker     Types: Cigarettes   Substance and Sexual Activity   • Alcohol use: Never     Frequency: Never     Comment: not seen from other facility records   • Drug use: Never     Comment: not seen from other facility records   • Sexual activity: Defer         Family History:   No family history on file.    Medications Prior to Admission:    Prior to Admission medications    Medication Sig Start Date End Date Taking? Authorizing Provider   acetaminophen (TYLENOL) 325 MG tablet Take 650 mg by mouth Every 6 (Six) Hours As Needed for Mild Pain , Headache or Fever.    Ella Verdin MD   atorvastatin (LIPITOR) 10 MG tablet Take 10 mg by mouth Daily.    Ella Verdin MD   B Complex-C-Folic Acid (YESY-JEFF) tablet Take 1 tablet by mouth Every Evening.    Ella Verdin MD   bismuth subsalicylate (PEPTO BISMOL) 262 MG/15ML suspension Take 15 mL by mouth Every 8 (Eight) Hours As Needed for Indigestion.    Ella Verdin MD   cloNIDine (CATAPRES) 0.1 MG tablet Take 0.2 mg by mouth As Needed for High Blood Pressure (at HD if SBP > 200 or SBP > 100, Do not give if HR < 65).    Ella Verdin MD   epoetin emy (EPOGEN,PROCRIT) 3000 UNIT/ML injection Inject 6,200 Units under the skin into the appropriate area as directed 3 (Three) Times a Week. Route is INTRAVENOUS per [facility] paperwork (would not let it be entered that way)    Ella Verdin MD   Fluticasone-Salmeterol (ADVAIR HFA IN) Inhale 1 puff 2 (Two) Times a Day. 45-21 mcg    Ella Verdin MD   HYDROcodone-acetaminophen (NORCO) 5-325 MG per tablet Take 1 tablet by mouth Every 6 (Six) Hours As Needed for Moderate Pain  or Severe Pain .    Ella Verdin MD   insulin aspart (novoLOG) 100 UNIT/ML injection Inject  under the skin into the appropriate area as directed 3 (Three) Times a Day Before Meals. Sliding scale insulin, scale not listed on paperwork    Ella Verdin MD   iron sucrose (VENOFER) 20 MG/ML injection Infuse 100 mg into a venous catheter 3 (Three) Times a Week.    Ella Verdin MD   lidocaine (LIDODERM) 5 % Place 1 patch on the skin as directed by provider Daily. Remove & Discard patch within 12 hours or as directed by MD Verdin  MD Ella   loperamide (IMODIUM) 2 MG capsule Take 2 mg by mouth Every 6 (Six) Hours As Needed for Diarrhea.    Ella Verdin MD   metoprolol succinate XL (TOPROL-XL) 25 MG 24 hr tablet Take 25 mg by mouth Daily.    Ella Verdin MD   midodrine (PROAMATINE) 10 MG tablet Take 10 mg by mouth 3 (Three) Times a Day Before Meals.    Ella Verdin MD   mirtazapine (REMERON) 45 MG tablet Take 45 mg by mouth Daily.    Ella Verdin MD   nitroglycerin (NITROSTAT) 0.4 MG SL tablet Place 0.4 mg under the tongue Every 5 (Five) Minutes As Needed for Chest Pain. Take no more than 3 doses in 15 minutes.    Ella Verdin MD   potassium phosphate, monobasic, (K-PHOS) 500 MG tablet Take 500 mg by mouth Daily. Only mon and Friday 1 tablet only  Before dialysis till  05/04/20 only.    Ella Verdin MD   promethazine (PHENERGAN) 25 MG tablet Take 12.5-25 mg by mouth Every 6 (Six) Hours As Needed for Nausea or Vomiting.    Ella Verdin MD       Allergies:  Ibuprofen and Aspirin      Review of system  Review of Systems   Unable to perform ROS: Mental status change       Vitals:    06/05/20 1915   BP: (!) 83/55   Pulse: 65   Resp:    Temp: 97.4 °F (36.3 °C)   SpO2:        Physical exam  Physical Exam   Constitutional:   Chronically ill appearing.   Neurological: She is alert.   Appears asleep with eyes open, left eye deviation, alerts to voice, some exotropia, ALESHA, speech dysarthric, oriented to name, hospital, right lower facial droop, VFF, moves all limbs against gravity, DTRs hypoactive, Babinskis negative.          Lab Results   Component Value Date    WBC 13.78 (H) 06/05/2020    HGB 8.4 (L) 06/05/2020    HCT 25.1 (L) 06/05/2020    MCV 87.5 06/05/2020    PLT 42 (C) 06/05/2020     Lab Results   Component Value Date    GLUCOSE 95 06/05/2020    BUN 10 06/05/2020    CREATININE 2.79 (H) 06/05/2020    EGFRIFNONA  05/03/2020      Comment:      <15 Indicative of kidney failure.     EGFRIFAFRI 20 (L) 2020    BCR 3.6 (L) 2020    CO2 21.2 (L) 2020    CALCIUM 7.1 (L) 2020    ALBUMIN 1.50 (L) 2020    LABIL2 0.7 (L) 2020    AST 62 (H) 2020    ALT 41 (H) 2020         Radiological Studies:    Ct Abdomen Pelvis Without Contrast    Result Date: 2020  CT ABDOMEN AND PELVIS WITHOUT CONTRAST  HISTORY: On dialysis for renal failure. Sepsis. History of peritoneal carcinomatosis.  TECHNIQUE/FINDINGS: The CT scan was performed as an emergency procedure through the abdomen and pelvis without contrast and demonstrates the followin. There are no prior studies for comparison. There is a small left pleural effusion layering posteriorly and there are several small areas of groundglass opacity scattered at the right lung base. The findings are nonspecific but could relate to atypical infection and includes the possibility of COVID-19 pneumonia. Further clinical correlation is recommended. 2. There is diffuse fatty infiltration of the liver. The spleen, pancreas, and both adrenal glands are unremarkable. There is a cyst involving the upper pole of the left kidney measuring 3.1 cm. There is no evidence of renal obstruction on either side. 3. The gallbladder is not visualized and may have been removed. There is very slight aneurysmal enlargement of the infrarenal aorta measuring 2.3 cm. There is no retroperitoneal lymphadenopathy. A vena cava filter is present. 4. The large and small bowel loops are normal in caliber. No inflammatory change is seen. No abnormality is seen in the pelvis. There is no CT evidence of carcinomatosis.      Radiation dose reduction techniques were utilized, including automated exposure control and exposure modulation based on body size.  This report was finalized on 2020 5:04 PM by Dr. Sinan Santana M.D.      Ct Head Without Contrast    Result Date: 2020  CT HEAD WITHOUT CONTRAST  HISTORY: Nonverbal, altered mental status.   COMPARISON: None.  FINDINGS: The study is hampered by patient motion. There is no evidence of hemorrhage. Moderate vascular calcification is noted. Areas of decreased attenuation involving the white matter of the cerebral hemispheres are noted bilaterally suggesting moderate-to-severe small vessel ischemic disease. There is a remote infarct involving the medial aspect of the  cerebellar hemisphere measuring approximately 4 cm in size. There is moderate-to-severe small vessel ischemic disease. There is volume loss with secondary enlargement of the left frontal horn.  There is an area of suspicious attenuation involving the right parietal lobe posteriorly measuring approximately 2.3 cm in size with an additional area of decreased attenuation involving the cortex of the right parietal lobe superolaterally measuring 3 cm in size. These areas are suspicious for areas of acute infarction.  There is no evidence of hemorrhage.      The study is hampered by patient motion but cortical areas of decreased attenuation suspicious for acute infarcts are identified in the right parietal region and right parietooccipital region posteriorly. Remote infarcts involving the periventricular white matter of the left frontal lobe and a large remote infarct involving the right cerebellar hemisphere are noted. Further evaluation with MRI examination of the brain is recommended.  The above information was called to and discussed with VELMA Sheikh.    Radiation dose reduction techniques were utilized, including automated exposure control and exposure modulation based on body size.  This report was finalized on 6/5/2020 4:22 PM by Dr. Elliot Galdamez M.D.      Xr Chest 1 View    Result Date: 6/5/2020  XR CHEST 1 VW-  HISTORY: Female who is 75 years-old,  hypoglycemia  TECHNIQUE: Frontal view of the chest  COMPARISON: 05/03/2020  FINDINGS: Stable appearing right-sided dual-lumen central venous catheter in the left chest port.  Heart, mediastinum and pulmonary vasculature are unremarkable. No focal pulmonary consolidation, pleural effusion, or pneumothorax. No acute osseous process.      No focal pulmonary consolidation. Follow-up as clinical indications persist.  This report was finalized on 6/5/2020 3:09 PM by Dr. Dell Wilson M.D.          During this visit the following were done:  Labs Reviewed [x]    Labs Ordered []    Radiology Reports Reviewed [x]    Radiology Ordered []    EKG, echo, and/or stress test reviewed [x]    EEG results reviewed  []    EEG reviewed and interpreted per myself   []    Discussed case with neurointerventionalist or neuroradiologist []    Referring Provider Records Reviewed []    ER Records Reviewed [x]    Hospital Records Reviewed []    History Obtained From Family []    Radiological images view and Interpreted per myself [x]    Case Discussed with referring provider []     Decision to obtain and request outside records  []        Assessment and Plan     TME 22 sepsis, improved, multiple old and possible subacute strokes, suspect cardiac embolic source multiple vascular territories. Likely high risk for AC given comorbidities. Allergic to aspirin.   - ICU observation.   - MRI brain.   - carotid duplex.   - EEG.   - TTE.   - Plavix and statin.   - SBP over 100.     Thanks,          Electronically signed by Carlos Jim MD on 6/5/2020 at 20:31

## 2020-06-06 NOTE — PROGRESS NOTES
Neurology Note    Patient:  Lucía Wise    YOB: 1945    REFERRING PHYSICIAN:  Anthony Perez,*    CHIEF COMPLAINT:    AMS    HISTORY OF PRESENT ILLNESS:   The patient is arousable, remains hypotensive on Levvophed, no seizures noted,     Past Medical History:  Past Medical History:   Diagnosis Date   • Acute kidney failure (CMS/HCC)    • Acute renal failure on dialysis (CMS/HCC)    • Anemia    • Anxiety    • Cancer (CMS/HCC)     perotineal cancer   • Depression    • Diabetes (CMS/HCC)    • DVT (deep venous thrombosis) (CMS/HCC)    • ESRD (end stage renal disease) (CMS/[facility])    • Family history of peritoneal cancer    • GERD (gastroesophageal reflux disease)    • History of transfusion    • Hyperparathyroidism (CMS/HCC)    • Hypertension    • Kidney stones    • Maintenance chemotherapy     last chemo was april 28/2020 (2nd dose) every 3 weeks for 6 doses   • Protein-calorie malnutrition (CMS/[facility])    • Pulmonary embolism (CMS/[facility])     Acute   • Uremia    • Vitamin D deficiency    • Weakness generalized        Past Surgical History:  History reviewed. No pertinent surgical history.    Social History:   Social History     Socioeconomic History   • Marital status: Single     Spouse name: Not on file   • Number of children: Not on file   • Years of education: Not on file   • Highest education level: Not on file   Tobacco Use   • Smoking status: Former Smoker     Types: Cigarettes   Substance and Sexual Activity   • Alcohol use: Never     Frequency: Never     Comment: not seen from other facility records   • Drug use: Never     Comment: not seen from other facility records   • Sexual activity: Defer        Family History:   History reviewed. No pertinent family history.    Medications Prior to Admission:    Prior to Admission medications    Medication Sig Start Date End Date Taking? Authorizing Provider   acetaminophen (TYLENOL) 325 MG tablet Take 650 mg by mouth Every 6 (Six) Hours As Needed for  Mild Pain , Headache or Fever.    Ella Verdin MD   atorvastatin (LIPITOR) 10 MG tablet Take 10 mg by mouth Daily.    Ella Verdin MD   B Complex-C-Folic Acid (YESY-JEFF) tablet Take 1 tablet by mouth Every Evening.    Ella Verdin MD   bismuth subsalicylate (PEPTO BISMOL) 262 MG/15ML suspension Take 15 mL by mouth Every 8 (Eight) Hours As Needed for Indigestion.    Ella Verdin MD   cloNIDine (CATAPRES) 0.1 MG tablet Take 0.2 mg by mouth As Needed for High Blood Pressure (at HD if SBP > 200 or SBP > 100, Do not give if HR < 65).    Ella Verdin MD   epoetin emy (EPOGEN,PROCRIT) 3000 UNIT/ML injection Inject 6,200 Units under the skin into the appropriate area as directed 3 (Three) Times a Week. Route is INTRAVENOUS per [facility] paperwork (would not let it be entered that way)    Ella Verdin MD   Fluticasone-Salmeterol (ADVAIR HFA IN) Inhale 1 puff 2 (Two) Times a Day. 45-21 mcg    Ella Verdin MD   HYDROcodone-acetaminophen (NORCO) 5-325 MG per tablet Take 1 tablet by mouth Every 6 (Six) Hours As Needed for Moderate Pain  or Severe Pain .    Ella Verdin MD   insulin aspart (novoLOG) 100 UNIT/ML injection Inject  under the skin into the appropriate area as directed 3 (Three) Times a Day Before Meals. Sliding scale insulin, scale not listed on paperwork    Ella Verdin MD   iron sucrose (VENOFER) 20 MG/ML injection Infuse 100 mg into a venous catheter 3 (Three) Times a Week.    Ella Verdin MD   lidocaine (LIDODERM) 5 % Place 1 patch on the skin as directed by provider Daily. Remove & Discard patch within 12 hours or as directed by Ella Shafer MD   loperamide (IMODIUM) 2 MG capsule Take 2 mg by mouth Every 6 (Six) Hours As Needed for Diarrhea.    Ella Verdin MD   metoprolol succinate XL (TOPROL-XL) 25 MG 24 hr tablet Take 25 mg by mouth Daily.    Ella Verdin MD   midodrine (PROAMATINE) 10 MG  tablet Take 10 mg by mouth 3 (Three) Times a Day Before Meals.    Ella Verdin MD   mirtazapine (REMERON) 45 MG tablet Take 45 mg by mouth Daily.    Ella Verdin MD   nitroglycerin (NITROSTAT) 0.4 MG SL tablet Place 0.4 mg under the tongue Every 5 (Five) Minutes As Needed for Chest Pain. Take no more than 3 doses in 15 minutes.    Ella Verdin MD   potassium phosphate, monobasic, (K-PHOS) 500 MG tablet Take 500 mg by mouth Daily. Only mon and Friday 1 tablet only  Before dialysis till  05/04/20 only.    Ella Verdin MD   promethazine (PHENERGAN) 25 MG tablet Take 12.5-25 mg by mouth Every 6 (Six) Hours As Needed for Nausea or Vomiting.    Ella Verdin MD       Allergies:  Ibuprofen and Aspirin      Review of system  Review of Systems   Unable to perform ROS: Mental status change       Vitals:    06/06/20 1330   BP: (!) 83/57   Pulse: 67   Resp:    Temp:    SpO2: 99%       Physical exam  Physical Exam   Cardiovascular: Normal rate and regular rhythm.   Pulmonary/Chest: Effort normal.   Neurological:   Alerts to voice, makes eye contact, speech dysarthric oriented to name only, mild right lower facial droop, moves limbs well against gravity.         Lab Results   Component Value Date    WBC 15.04 (H) 06/06/2020    HGB 8.4 (L) 06/06/2020    HCT 25.3 (L) 06/06/2020    MCV 87.8 06/06/2020    PLT 42 (C) 06/06/2020     Lab Results   Component Value Date    GLUCOSE 142 (H) 06/06/2020    BUN 10 06/06/2020    CREATININE 2.69 (H) 06/06/2020    EGFRIFNONA  05/03/2020      Comment:      <15 Indicative of kidney failure.    EGFRIFAFRI 21 (L) 06/06/2020    BCR 3.7 (L) 06/06/2020    CO2 17.7 (L) 06/06/2020    CALCIUM 7.2 (L) 06/06/2020    ALBUMIN 1.50 (L) 06/06/2020    LABIL2 0.7 (L) 05/28/2020    AST 62 (H) 06/05/2020    ALT 41 (H) 06/05/2020         Radiological Studies:  Ct Abdomen Pelvis Without Contrast    Result Date: 6/5/2020  CT ABDOMEN AND PELVIS WITHOUT CONTRAST  HISTORY: On  dialysis for renal failure. Sepsis. History of peritoneal carcinomatosis.  TECHNIQUE/FINDINGS: The CT scan was performed as an emergency procedure through the abdomen and pelvis without contrast and demonstrates the followin. There are no prior studies for comparison. There is a small left pleural effusion layering posteriorly and there are several small areas of groundglass opacity scattered at the right lung base. The findings are nonspecific but could relate to atypical infection and includes the possibility of COVID-19 pneumonia. Further clinical correlation is recommended. 2. There is diffuse fatty infiltration of the liver. The spleen, pancreas, and both adrenal glands are unremarkable. There is a cyst involving the upper pole of the left kidney measuring 3.1 cm. There is no evidence of renal obstruction on either side. 3. The gallbladder is not visualized and may have been removed. There is very slight aneurysmal enlargement of the infrarenal aorta measuring 2.3 cm. There is no retroperitoneal lymphadenopathy. A vena cava filter is present. 4. The large and small bowel loops are normal in caliber. No inflammatory change is seen. No abnormality is seen in the pelvis. There is no CT evidence of carcinomatosis.      Radiation dose reduction techniques were utilized, including automated exposure control and exposure modulation based on body size.  This report was finalized on 2020 5:04 PM by Dr. Sinan Santana M.D.      Ct Head Without Contrast    Result Date: 2020  CT HEAD WITHOUT CONTRAST  HISTORY: Nonverbal, altered mental status.  COMPARISON: None.  FINDINGS: The study is hampered by patient motion. There is no evidence of hemorrhage. Moderate vascular calcification is noted. Areas of decreased attenuation involving the white matter of the cerebral hemispheres are noted bilaterally suggesting moderate-to-severe small vessel ischemic disease. There is a remote infarct involving the medial aspect of  the  cerebellar hemisphere measuring approximately 4 cm in size. There is moderate-to-severe small vessel ischemic disease. There is volume loss with secondary enlargement of the left frontal horn.  There is an area of suspicious attenuation involving the right parietal lobe posteriorly measuring approximately 2.3 cm in size with an additional area of decreased attenuation involving the cortex of the right parietal lobe superolaterally measuring 3 cm in size. These areas are suspicious for areas of acute infarction.  There is no evidence of hemorrhage.      The study is hampered by patient motion but cortical areas of decreased attenuation suspicious for acute infarcts are identified in the right parietal region and right parietooccipital region posteriorly. Remote infarcts involving the periventricular white matter of the left frontal lobe and a large remote infarct involving the right cerebellar hemisphere are noted. Further evaluation with MRI examination of the brain is recommended.  The above information was called to and discussed with VELMA Sheikh.    Radiation dose reduction techniques were utilized, including automated exposure control and exposure modulation based on body size.  This report was finalized on 6/5/2020 4:22 PM by Dr. Elliot Galdamez M.D.      Xr Chest 1 View    Result Date: 6/6/2020  XR CHEST 1 VW-  HISTORY: Female who is 75 years-old,  sepsis  TECHNIQUE: Frontal view of the chest  COMPARISON: 06/05/2020  FINDINGS: Stable appearing right-sided dual-lumen central venous catheter, left chest port. The heart size is normal. The aorta appears tortuous, appearance may be exaggerated by rightward patient rotation. Pulmonary vasculature is unremarkable. No focal pulmonary consolidation, pleural effusion, or pneumothorax. No acute osseous process.      No focal pulmonary consolidation. Follow-up as clinical indications persist.  This report was finalized on 6/6/2020 10:35 AM by   Dell Wilson M.D.      Xr Chest 1 View    Result Date: 6/5/2020  XR CHEST 1 VW-  HISTORY: Female who is 75 years-old,  hypoglycemia  TECHNIQUE: Frontal view of the chest  COMPARISON: 05/03/2020  FINDINGS: Stable appearing right-sided dual-lumen central venous catheter in the left chest port. Heart, mediastinum and pulmonary vasculature are unremarkable. No focal pulmonary consolidation, pleural effusion, or pneumothorax. No acute osseous process.      No focal pulmonary consolidation. Follow-up as clinical indications persist.  This report was finalized on 6/5/2020 3:09 PM by Dr. Dell Wilson M.D.          During this visit the following were done:  Labs Reviewed [x]    Labs Ordered []    Radiology Reports Reviewed []    Radiology Ordered []    EKG, echo, and/or stress test reviewed [x]    EEG results reviewed  []    EEG reviewed and interpreted per myself   []    Discussed case with neurointerventionalist or neuroradiologist []    Referring Provider Records Reviewed []    ER Records Reviewed []    Hospital Records Reviewed []    History Obtained From Family []    Radiological images view and Interpreted per myself []    Case Discussed with referring provider []     Decision to obtain and request outside records  []        Assessment and Plan     TME, stable. Suspected septic shock. Multiple old and several suspected new strokes, suspect cardiac embolic source. ESRD. Stage IV cancer. Guarded prognosis.   - MRI/MRA brain when stable enough to leave ICU.   - EEG.   - TTE.   - Carotid duplex.   - Plavix and statin.   - ST, PT, OT.              Electronically signed by Carlos Jim MD on 6/6/2020 at 13:42

## 2020-06-06 NOTE — CONSULTS
[facility] Consult Note       Patient Identification:  Name: Lucía Wise  Age: 75 y.o.  Sex: female  :  1945  MRN: [id number]    I would like to thank you for the opportunity to participate in care of this patient.    Date of Service: 2020                        Reason for Consult:         ESRD, on hemodialysis, has right IJ tunneled catheter, was found to have gram-negative bacteremia.      History of Present Illness:       Patient is a 74-year-old woman who has a history of hypertension, diabetes type 2,Primary serous carcinoma of uterine adnexa,  Stage IV primary peritoneal carcinomatosis followed by  Dr. Fitzpatrick at [facility], s/p cycle 2 of chemotherapy Taxol, carboplatinum as of 2020. COPD, aortic stenosis, on chemotherapy, ESRD, hemodialysis  at [facility], has right IJ tunnel catheter, patient was brought by EMS from dialysis unit as patient blood pressures running low and was confused at the start of dialysis.  Initial evaluation that patient was septic with leukocytosis and some acidosis.  IV fluid was given.  Patient is still nonverbal and moaning.,  No dialysis done since Wednesday.  Creatinine and other parameters are normal.  No significant acidosis.            Review of Systems:         : Could not be obtained at this time  Past medical history, surgical history, social history, family history, allergies and all medications reviewed and agreed.      Past History/Allergies?Social History:     Past Medical History:   Diagnosis Date   • Acute kidney failure (CMS/HCC)    • Acute renal failure on dialysis (CMS/[facility])    • Anemia    • Anxiety    • Cancer (CMS/HCC)     perotineal cancer   • Depression    • Diabetes (CMS/HCC)    • DVT (deep venous thrombosis) (CMS/HCC)    • ESRD (end stage renal disease) (CMS/[facility])     • Family history of peritoneal cancer    • GERD (gastroesophageal reflux disease)    • History of transfusion    • Hyperparathyroidism (CMS/HCC)    • Hypertension    • Kidney stones    • Maintenance chemotherapy     last chemo was april 28/2020 (2nd dose) every 3 weeks for 6 doses   • Protein-calorie malnutrition (CMS/HCC)    • Pulmonary embolism (CMS/HCC)     Acute   • Uremia    • Vitamin D deficiency    • Weakness generalized          Past Surgical History :     No past surgical history on file.       Family History:     No family history on file.       Social History:     Social History     Tobacco Use   • Smoking status: Former Smoker     Types: Cigarettes   Substance Use Topics   • Alcohol use: Never     Frequency: Never     Comment: not seen from other facility records          Allergies:     Allergies   Allergen Reactions   • Ibuprofen Other (See Comments)     Kidney failure   • Aspirin Unknown - Low Severity         Home meds:     Medications Prior to Admission   Medication Sig Dispense Refill Last Dose   • acetaminophen (TYLENOL) 325 MG tablet Take 650 mg by mouth Every 6 (Six) Hours As Needed for Mild Pain , Headache or Fever.      • atorvastatin (LIPITOR) 10 MG tablet Take 10 mg by mouth Daily.      • B Complex-C-Folic Acid (YESY-JEFF) tablet Take 1 tablet by mouth Every Evening.      • bismuth subsalicylate (PEPTO BISMOL) 262 MG/15ML suspension Take 15 mL by mouth Every 8 (Eight) Hours As Needed for Indigestion.      • cloNIDine (CATAPRES) 0.1 MG tablet Take 0.2 mg by mouth As Needed for High Blood Pressure (at HD if SBP > 200 or SBP > 100, Do not give if HR < 65).      • epoetin emy (EPOGEN,PROCRIT) 3000 UNIT/ML injection Inject 6,200 Units under the skin into the appropriate area as directed 3 (Three) Times a Week. Route is INTRAVENOUS per NH paperwork (would not let it be entered that way)      • Fluticasone-Salmeterol (ADVAIR HFA IN) Inhale 1 puff 2 (Two) Times a Day. 45-21 mcg      •  HYDROcodone-acetaminophen (NORCO) 5-325 MG per tablet Take 1 tablet by mouth Every 6 (Six) Hours As Needed for Moderate Pain  or Severe Pain .      • insulin aspart (novoLOG) 100 UNIT/ML injection Inject  under the skin into the appropriate area as directed 3 (Three) Times a Day Before Meals. Sliding scale insulin, scale not listed on paperwork      • iron sucrose (VENOFER) 20 MG/ML injection Infuse 100 mg into a venous catheter 3 (Three) Times a Week.      • lidocaine (LIDODERM) 5 % Place 1 patch on the skin as directed by provider Daily. Remove & Discard patch within 12 hours or as directed by MD      • loperamide (IMODIUM) 2 MG capsule Take 2 mg by mouth Every 6 (Six) Hours As Needed for Diarrhea.      • metoprolol succinate XL (TOPROL-XL) 25 MG 24 hr tablet Take 25 mg by mouth Daily.      • midodrine (PROAMATINE) 10 MG tablet Take 10 mg by mouth 3 (Three) Times a Day Before Meals.      • mirtazapine (REMERON) 45 MG tablet Take 45 mg by mouth Daily.      • nitroglycerin (NITROSTAT) 0.4 MG SL tablet Place 0.4 mg under the tongue Every 5 (Five) Minutes As Needed for Chest Pain. Take no more than 3 doses in 15 minutes.      • potassium phosphate, monobasic, (K-PHOS) 500 MG tablet Take 500 mg by mouth Daily. Only mon and Friday 1 tablet only  Before dialysis till  20 only.      • promethazine (PHENERGAN) 25 MG tablet Take 12.5-25 mg by mouth Every 6 (Six) Hours As Needed for Nausea or Vomiting.            Scheduled meds:       atorvastatin 80 mg Oral Nightly   clopidogrel 75 mg Oral Daily   meropenem 1 g Intravenous Q24H   sodium chloride 10 mL Intravenous Q12H   sodium chloride 10 mL Intravenous Q12H   Vancomycin Pharmacy Intermittent Dosing  Does not apply Daily         Objectives:     tMax 24 hrs: Temp (24hrs), Av.6 °F (36.4 °C), Min:96.9 °F (36.1 °C), Max:98.2 °F (36.8 °C)      Vitals Ranges:   Temp:  [96.9 °F (36.1 °C)-98.2 °F (36.8 °C)] 97.5 °F (36.4 °C)  Heart Rate:  [63-82] 68  Resp:  [18] 18  BP:  "()/() 84/74    Intake and Output Last 3 Shifts:   I/O last 3 completed shifts:  In: 3275 [I.V.:2175; IV Piggyback:1100]  Out: -       Exam:     BP (!) 84/74   Pulse 68   Temp 97.5 °F (36.4 °C) (Oral)   Resp 18   Ht 167.6 cm (66\")   Wt 79.6 kg (175 lb 7.8 oz)   SpO2 98%   BMI 28.32 kg/m²     General Appearance:   Appears chronically ill looking.   Head:    Normocephalic, atraumatic   Eyes:     EOM's intact, sclerae anicteric        Ears:    TMs not observed   Nose:   Patent without discharge   Neck:  Right IJ tunneled catheter.  On left side patient has [id number]   Lungs:     Clear to auscultation bilaterally, respiratory effort is normal   Chest wall:    No tenderness   Heart:    Regular rate and rhythm, S1 and S2 normal, no   rub    or gallop   Abdomen:     Soft, nontender, nondistended,  no masses, no organomegaly   Extremities:  trace edema   Neurologic:  No focal deficits.  Speech is fluent.  Conversation is coherent.       Data Review:       BMP:   Results from last 7 days   Lab Units 06/06/20  0522   GLUCOSE mg/dL 142*   CO2 mmol/L 17.7*   BUN mg/dL 10   CREATININE mg/dL 2.69*   CALCIUM mg/dL 7.2*     Coagulation:   No results found for: INR, APTT  Cardiac markers:     ABGs:   Results from last 7 days   Lab Units 06/06/20  0740   BASE EXCESS ART mmol/L -6.2*              Imaging:      [unfilled]      Assessment:      · Likely sepsis with significant leukocytosis, lactic acidosis, low blood pressure on pressors  · ESRD, has right IJ tunnel catheter, goes to [facility], hemodialysis Monday Wednesday Friday.  · Volume, mildly increased.  · Gram-negative bacteremia, blood cultures from outpatient 2 x 2 positive for gram-negative bacteremia.  · Peritoneal carcinomatosis.  · Stage IV primary peritoneal carcinomatosis followed by Dr. Fitzpatrick at [facility], s/p cycle 2 of chemotherapy Taxol, carboplatinum as of 4/29/2020.      Sepsis with acute renal failure and septic shock " (CMS/HCC)    ·       Plan:     · At this time hemodialysis will be on the hold as patient has no significant metabolic acidosis potassium and other markers of CKD are acceptable although bicarb level is 17.  · Follow-up with repeat labs tomorrow morning  · Patient prognosis seem to be poor because of the presence of metastatic cancer in the presence of ESRD  · Will do hemodialysis tomorrow if patient is hemodynamically stable otherwise CRRT will be considered tomorrow morning  · No acute indication for renal replacement therapy  · Patient has right IJ catheter blood cultures was drawn rule out catheter related bacteremia  ·  Stage IV primary peritoneal carcinomatosis followed by Dr. Fitzpatrick at [facility], s/p cycle 2 of chemotherapy Taxol, carboplatinum as of 4/29/2020.  · Patient has severe thrombocytopenia which is chronic.  Suspected chemotherapy-induced  · Hemoglobin is on the low side but acceptable with ESRD, malignancy, no need of transfusion..  · Overall outlook is poor.  · Thanks for consultation.          Cristobal Pierce MD  6/6/2020

## 2020-06-07 NOTE — PROGRESS NOTES
Neurology Note    Patient:  Lucía Wise    YOB: 1945    REFERRING PHYSICIAN:  Anthony Perez,*    CHIEF COMPLAINT:    AMS    HISTORY OF PRESENT ILLNESS:   The patient's BP has continued to fall in spite of aggressive pressure support. She has been intubated for respiratory failure. She has received FFP for severe coagulopathy, Plavix stopped. No seizures reported. Family has opted for DNR, considering comfort care. CRT stopped.    Past Medical History:  Past Medical History:   Diagnosis Date   • Acute kidney failure (CMS/HCC)    • Acute renal failure on dialysis (CMS/HCC)    • Anemia    • Anxiety    • Cancer (CMS/HCC)     perotineal cancer   • Depression    • Diabetes (CMS/HCC)    • DVT (deep venous thrombosis) (CMS/HCC)    • ESRD (end stage renal disease) (CMS/HCC)    • Family history of peritoneal cancer    • GERD (gastroesophageal reflux disease)    • History of transfusion    • Hyperparathyroidism (CMS/HCC)    • Hypertension    • Kidney stones    • Maintenance chemotherapy     last chemo was april 28/2020 (2nd dose) every 3 weeks for 6 doses   • Protein-calorie malnutrition (CMS/HCC)    • Pulmonary embolism (CMS/HCC)     Acute   • Uremia    • Vitamin D deficiency    • Weakness generalized        Past Surgical History:  History reviewed. No pertinent surgical history.    Social History:   Social History     Socioeconomic History   • Marital status: Single     Spouse name: Not on file   • Number of children: Not on file   • Years of education: Not on file   • Highest education level: Not on file   Tobacco Use   • Smoking status: Former Smoker     Types: Cigarettes   Substance and Sexual Activity   • Alcohol use: Never     Frequency: Never     Comment: not seen from other facility records   • Drug use: Never     Comment: not seen from other facility records   • Sexual activity: Defer        Family History:   History reviewed. No pertinent family history.    Medications Prior to Admission:     Prior to Admission medications    Medication Sig Start Date End Date Taking? Authorizing Provider   acetaminophen (TYLENOL) 325 MG tablet Take 650 mg by mouth Every 6 (Six) Hours As Needed for Mild Pain , Headache or Fever.    Ella Verdin MD   atorvastatin (LIPITOR) 10 MG tablet Take 10 mg by mouth Daily.    Ella Verdin MD   B Complex-C-Folic Acid (YESY-JEFF) tablet Take 1 tablet by mouth Every Evening.    Ella Verdin MD   bismuth subsalicylate (PEPTO BISMOL) 262 MG/15ML suspension Take 15 mL by mouth Every 8 (Eight) Hours As Needed for Indigestion.    Ella Verdin MD   cloNIDine (CATAPRES) 0.1 MG tablet Take 0.2 mg by mouth As Needed for High Blood Pressure (at HD if SBP > 200 or SBP > 100, Do not give if HR < 65).    Ella Verdin MD   epoetin emy (EPOGEN,PROCRIT) 3000 UNIT/ML injection Inject 6,200 Units under the skin into the appropriate area as directed 3 (Three) Times a Week. Route is INTRAVENOUS per NH paperwork (would not let it be entered that way)    Ella Verdin MD   Fluticasone-Salmeterol (ADVAIR HFA IN) Inhale 1 puff 2 (Two) Times a Day. 45-21 mcg    Ella Verdin MD   HYDROcodone-acetaminophen (NORCO) 5-325 MG per tablet Take 1 tablet by mouth Every 6 (Six) Hours As Needed for Moderate Pain  or Severe Pain .    Ella Verdin MD   insulin aspart (novoLOG) 100 UNIT/ML injection Inject  under the skin into the appropriate area as directed 3 (Three) Times a Day Before Meals. Sliding scale insulin, scale not listed on paperwork    Ella Verdin MD   iron sucrose (VENOFER) 20 MG/ML injection Infuse 100 mg into a venous catheter 3 (Three) Times a Week.    Ella Verdin MD   lidocaine (LIDODERM) 5 % Place 1 patch on the skin as directed by provider Daily. Remove & Discard patch within 12 hours or as directed by MD    Ella Verdin MD   loperamide (IMODIUM) 2 MG capsule Take 2 mg by mouth Every 6 (Six) Hours As  Needed for Diarrhea.    Ella Verdin MD   metoprolol succinate XL (TOPROL-XL) 25 MG 24 hr tablet Take 25 mg by mouth Daily.    Ella Verdin MD   midodrine (PROAMATINE) 10 MG tablet Take 10 mg by mouth 3 (Three) Times a Day Before Meals.    Ella Verdin MD   mirtazapine (REMERON) 45 MG tablet Take 45 mg by mouth Daily.    Ella Verdin MD   nitroglycerin (NITROSTAT) 0.4 MG SL tablet Place 0.4 mg under the tongue Every 5 (Five) Minutes As Needed for Chest Pain. Take no more than 3 doses in 15 minutes.    Ella Verdin MD   potassium phosphate, monobasic, (K-PHOS) 500 MG tablet Take 500 mg by mouth Daily. Only mon and Friday 1 tablet only  Before dialysis till  05/04/20 only.    Ella Verdin MD   promethazine (PHENERGAN) 25 MG tablet Take 12.5-25 mg by mouth Every 6 (Six) Hours As Needed for Nausea or Vomiting.    Ella Verdin MD       Allergies:  Ibuprofen and Aspirin      Review of system  Review of Systems   Unable to perform ROS: Intubated       Vitals:    06/07/20 1030   BP:    Pulse: 79   Resp:    Temp:    SpO2:        Physical exam  Physical Exam   Constitutional: She is intubated.   Cardiovascular: Normal rate and regular rhythm.   Pulmonary/Chest: She is intubated.   Neurological:   Eyes open, some exotropia, attempts to make eye contact to voice, moves limbs weakly on request.         Lab Results   Component Value Date    WBC 2.87 (L) 06/07/2020    HGB 6.5 (C) 06/07/2020    HCT 20.0 (C) 06/07/2020    MCV 89.3 06/07/2020    PLT 12 (C) 06/07/2020     Lab Results   Component Value Date    GLUCOSE 220 (H) 06/07/2020    BUN 7 (L) 06/07/2020    CREATININE 1.97 (H) 06/07/2020    EGFRIFNONA  05/03/2020      Comment:      <15 Indicative of kidney failure.    EGFRIFAFRI 30 (L) 06/07/2020    BCR 3.6 (L) 06/07/2020    CO2 8.1 (L) 06/07/2020    CALCIUM 7.0 (L) 06/07/2020    ALBUMIN 1.60 (L) 06/07/2020    LABIL2 0.7 (L) 05/28/2020    AST 62 (H) 06/05/2020    ALT  41 (H) 06/05/2020           During this visit the following were done:  Labs Reviewed [x]    Labs Ordered []    Radiology Reports Reviewed []    Radiology Ordered []    EKG, echo, and/or stress test reviewed []    EEG results reviewed  []    EEG reviewed and interpreted per myself   []    Discussed case with neurointerventionalist or neuroradiologist []    Referring Provider Records Reviewed []    ER Records Reviewed []    Hospital Records Reviewed []    History Obtained From Family []    Radiological images view and Interpreted per myself []    Case Discussed with referring provider []     Decision to obtain and request outside records  []        Assessment and Plan     TME 22 septic shock which appears severe, poor prognosis. Multiple chronic and subacute strokes, suspect cardiac embolic strokes. AC contraindicated given severe coagulopathy. Patient is DNR, family considering comfort care.  - Call for questions, will see prn.    Thanks,               Electronically signed by Carlos Jim MD on 6/7/2020 at 10:37

## 2020-06-07 NOTE — PROGRESS NOTES
LOS: 2 days     Chief Complaint: Septic shock    Interval History: Hypothermic, clinically worsened in the last 24 hours requiring intubation.  Currently on 4 pressors.  Not tolerating CRRT.  Severe metabolic acidosis    Vital Signs  Temp:  [94.5 °F (34.7 °C)-97.7 °F (36.5 °C)] 94.8 °F (34.9 °C)  Heart Rate:  [65-99] 79  Resp:  [14-27] 24  BP: ()/() 85/51  FiO2 (%):  [70 %-100 %] 70 %    Physical Exam:  General: Intubated  Cardiovascular: RRR, + LE edema   Respiratory: Bilateral crackles  GI: Soft, NT/ND, hypoactive bowel sounds  Skin: No rashes   Access: left subclavian port without erythema or purulence, right subclavian central line without erythema or purulence    Antibiotics:  Meropenem 1 g IV every 24 hours  Vancomycin dosing per pharmacy     Results Review:    Lab Results   Component Value Date    WBC 2.87 (L) 06/07/2020    HGB 6.5 (C) 06/07/2020    HCT 20.0 (C) 06/07/2020    MCV 89.3 06/07/2020    PLT 12 (C) 06/07/2020     Lab Results   Component Value Date    GLUCOSE 220 (H) 06/07/2020    BUN 7 (L) 06/07/2020    CREATININE 1.97 (H) 06/07/2020    EGFRIFNONA  05/03/2020      Comment:      <15 Indicative of kidney failure.    EGFRIFAFRI 30 (L) 06/07/2020    BCR 3.6 (L) 06/07/2020    CO2 8.1 (L) 06/07/2020    CALCIUM 7.0 (L) 06/07/2020    ALBUMIN 1.60 (L) 06/07/2020    LABIL2 0.7 (L) 05/28/2020    AST 62 (H) 06/05/2020    ALT 41 (H) 06/05/2020       Microbiology:  6/5 BC xNGTD x 2  6/5 COVID neg  6/5 UCx yeast     Echocardiogram with an EF of 70%    Assessment/Plan   Septic shock   Acute stroke  Leukocytosis -now with pancytopenia  Altered mental status  Elevated procalcitonin  Peritoneal cancer on chemotherapy complicating above  Hypertension  End-stage renal disease on hemodialysis complicating above  Diabetes complicating above  Metabolic acidosis/lactic acidosis    Multiorgan system failure.  Blood cultures remain negative to date.  On empiric vancomycin and meropenem no source of infection  identified.      Grave prognosis    I have discussed this case with: Dr. Perez

## 2020-06-07 NOTE — DISCHARGE SUMMARY
"   DISCHARGE SUMMARY    Patient Name: Lucía Wise  Age/Sex: 75 y.o. 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  Evaluation in the ER revealed a white count of 13 with left shift as well as a CT scan of the head showing new acute strokes as well as chronic.  Patient was noted to be borderline hypotensive and admitted to the ICU for this reason as well as close neurology monitoring.  She repeatedly turns head, moans \"no\" to most questions however questionable if this is at all reliable or meaningful.  She is ill appearing and unable to give reliable history.\" per       [facility]   Patient admitted to the ICU with acute encephalopathy, suspected CVA, septic shock with severe lactic acidosis and metabolic " acidosis.  She had a complex previous course in the last several months with peritoneal carcinoma with ongoing chemotherapy and related complications.  She eventually declined further and required multiple vasopressor support and was even placed on CRRT to help with lactic acidosis but did not make progress.  Infectious diseases and neurology were consulted to help in the management of the patient.  Patient continued to decline and I had discussed with the patient's daughter Rylee about the ongoing issues and explained to her that she is on maximum support and any further aggressive care would not .  Family started to make her DNR and patient eventually passed away comfortably while on the mechanical ventilator.  Emotional support provided to the family.      Procedures Performed    06/06 2319 INTUBATION    Consults:   Consults     Date and Time Order Name Status Description    6/5/2020 1905 Inpatient Infectious Diseases Consult Completed     6/5/2020 1904 Inpatient Nephrology Consult      6/5/2020 1742 Inpatient Neurology Consult Stroke Completed     6/5/2020 1527 Pulmonology (on-call MD unless specified) Completed           Pertinent Test Results:   Results from last 7 days   Lab Units 06/07/20  1521 06/07/20 0617 06/07/20 0019 06/06/20 0522 06/05/20  1345   SODIUM mmol/L 143 142 143 139 138   POTASSIUM mmol/L 6.0* 4.1 4.3 3.9 4.4   CHLORIDE mmol/L 105 105 108* 103 101   CO2 mmol/L 4.1* 8.1* 6.5* 17.7* 21.2*   BUN mg/dL 6* 7* 10 10 10   CREATININE mg/dL 1.92* 1.97* 2.92* 2.69* 2.79*   GLUCOSE mg/dL 326* 220* 155* 142* 95   CALCIUM mg/dL 6.3* 7.0* 6.9* 7.2* 7.1*   AST (SGOT) U/L  --   --   --   --  62*   ALT (SGPT) U/L  --   --   --   --  41*         Results from last 7 days   Lab Units 06/07/20  1521 06/07/20 0617 06/07/20  0019 06/06/20 0522 06/05/20  1345   WBC 10*3/mm3 3.67 2.87* 2.75* 15.04* 13.78*   HEMOGLOBIN g/dL 5.1* 6.5* 8.1* 8.4* 8.4*   HEMATOCRIT % 16.4* 20.0* 25.0* 25.3* 25.1*      PLATELETS 10*3/mm3 58* 12* 26* 42* 42*   MCV fL 98.8* 89.3 90.9 87.8 87.5   MCH pg 30.7 29.0 29.5 29.2 29.3   MCHC g/dL 31.1* 32.5 32.4 33.2 33.5   RDW % 15.2 18.1* 18.4* 18.0* 17.8*   RDW-SD fl 54.3* 58.3* 60.5* 57.3* 56.0*   MPV fL 9.7  --   --   --   --    NEUTROS ABS 10*3/mm3 1.06* 1.33*  --  14.14* 12.95*   EOS ABS 10*3/mm3 0.07  --   --   --  0.14   BASOS ABS 10*3/mm3 0.04  --   --   --   --    NRBC /100 WBC 37.0* 161.9*  --  7.0* 4.0*     Results from last 7 days   Lab Units 06/07/20  0617 06/07/20  0019 06/06/20  1727 06/06/20  1603   INR  3.49*  --  6.62* 6.67*   APTT seconds  --  >200.0* >200.0* >200.0*     Results from last 7 days   Lab Units 06/07/20  1521   MAGNESIUM mg/dL 1.9     Results from last 7 days   Lab Units 06/06/20  0522   CHOLESTEROL mg/dL 82   TRIGLYCERIDES mg/dL 161*   HDL CHOL mg/dL 8*             Results from last 7 days   Lab Units 06/07/20  0023 06/06/20  2225 06/06/20  0740   PH, ARTERIAL pH units 7.011* 7.000* 7.385   PO2 ART mm Hg 277.5* 64.0* 81.3   PCO2, ARTERIAL mm Hg 27.8* 32.5* 29.9*   HCO3 ART mmol/L 7.0* 8.0* 17.9*     Results from last 7 days   Lab Units 06/07/20  0020 06/06/20  0115 06/05/20  2030 06/05/20  1345   PROCALCITONIN ng/mL  --   --   --  10.33*   LACTATE mmol/L 17.6* 6.7* 6.7* 6.0*             Results from last 7 days   Lab Units 06/05/20  2030 06/05/20  1708 06/05/20  1412   BLOODCX  No growth at 24 hours No growth at 2 days  --    URINECX   --   --  Yeast isolated*     Results from last 7 days   Lab Units 06/05/20  1412   NITRITE UA  Negative   WBC UA /HPF Too Numerous to Count*   BACTERIA UA /HPF Unable to determine due to loaded field*   SQUAM EPITHEL UA /HPF Unable to determine due to loaded field*   URINECX  Yeast isolated*               Imaging Results:  Imaging Results (All)     Procedure Component Value Units Date/Time    XR Chest 1 View [[id number]] Collected:  06/06/20 2323     Updated:  06/06/20 2328    Narrative:       PORTABLE CHEST 06/06/2020 AT  11:03 PM     CLINICAL HISTORY: Airway compromise. Evaluate ET tube placement.     Compared to the previous chest dated 06/06/2020 at 0949 hours.     In the interval, an endotracheal tube has been inserted and appears in  satisfactory addition with its tip a few centimeters above the fran. A  right internal jugular dialysis catheter and left subclavian Mediport  catheter remain in satisfactory position. A nasogastric feeding tube has  also been inserted. The lungs are fairly well-expanded and appear free  of infiltrates. There are no pleural effusions. The heart is normal in  size.     This report was finalized on 6/6/2020 11:25 PM by Dr. Dmitri Lala M.D.       XR Abdomen KUB [[id number]] Collected:  06/06/20 1455     Updated:  06/06/20 1524    Narrative:       KUB     HISTORY: 75-year-old female status post Cortrak placement.     FINDINGS: A KUB was obtained and demonstrates the tip of the feeding  tube in the expected location of the distal stomach.     This report was finalized on 6/6/2020 3:20 PM by Dr. Willy Malin M.D.       XR Chest 1 View [[id number]] Collected:  06/06/20 1033     Updated:  06/06/20 1038    Narrative:       XR CHEST 1 VW-     HISTORY: Female who is 75 years-old,  sepsis     TECHNIQUE: Frontal view of the chest     COMPARISON: 06/05/2020     FINDINGS: Stable appearing right-sided dual-lumen central venous  catheter, left chest port. The heart size is normal. The aorta appears  tortuous, appearance may be exaggerated by rightward patient rotation.  Pulmonary vasculature is unremarkable. No focal pulmonary consolidation,  pleural effusion, or pneumothorax. No acute osseous process.       Impression:       No focal pulmonary consolidation. Follow-up as clinical  indications persist.     This report was finalized on 6/6/2020 10:35 AM by Dr. Dell Wilson M.D.       CT Abdomen Pelvis Without Contrast [[id number]] Collected:  06/05/20 1655     Updated:  06/05/20 1707    Narrative:        CT ABDOMEN AND PELVIS WITHOUT CONTRAST     HISTORY: On dialysis for renal failure. Sepsis. History of peritoneal  carcinomatosis.     TECHNIQUE/FINDINGS: The CT scan was performed as an emergency procedure  through the abdomen and pelvis without contrast and demonstrates the  followin. There are no prior studies for comparison. There is a small left  pleural effusion layering posteriorly and there are several small areas  of groundglass opacity scattered at the right lung base. The findings  are nonspecific but could relate to atypical infection and includes the  possibility of COVID-19 pneumonia. Further clinical correlation is  recommended.  2. There is diffuse fatty infiltration of the liver. The spleen,  pancreas, and both adrenal glands are unremarkable. There is a cyst  involving the upper pole of the left kidney measuring 3.1 cm. There is  no evidence of renal obstruction on either side.  3. The gallbladder is not visualized and may have been removed. There is  very slight aneurysmal enlargement of the infrarenal aorta measuring 2.3  cm. There is no retroperitoneal lymphadenopathy. A vena cava filter is  present.  4. The large and small bowel loops are normal in caliber. No  inflammatory change is seen. No abnormality is seen in the pelvis. There  is no CT evidence of carcinomatosis.                 Radiation dose reduction techniques were utilized, including automated  exposure control and exposure modulation based on body size.     This report was finalized on 2020 5:04 PM by Dr. Sinan Santana M.D.       CT Head Without Contrast [[id number]] Collected:  20 1606     Updated:  20 1628    Narrative:       CT HEAD WITHOUT CONTRAST     HISTORY: Nonverbal, altered mental status.     COMPARISON: None.     FINDINGS: The study is hampered by patient motion. There is no evidence  of hemorrhage. Moderate vascular calcification is noted. Areas of  decreased attenuation involving the white matter of  the cerebral  hemispheres are noted bilaterally suggesting moderate-to-severe small  vessel ischemic disease. There is a remote infarct involving the medial  aspect of the  cerebellar hemisphere measuring approximately 4 cm in  size. There is moderate-to-severe small vessel ischemic disease. There  is volume loss with secondary enlargement of the left frontal horn.     There is an area of suspicious attenuation involving the right parietal  lobe posteriorly measuring approximately 2.3 cm in size with an  additional area of decreased attenuation involving the cortex of the  right parietal lobe superolaterally measuring 3 cm in size. These areas  are suspicious for areas of acute infarction.     There is no evidence of hemorrhage.       Impression:       The study is hampered by patient motion but cortical areas  of decreased attenuation suspicious for acute infarcts are identified in  the right parietal region and right parietooccipital region posteriorly.  Remote infarcts involving the periventricular white matter of the left  frontal lobe and a large remote infarct involving the right cerebellar  hemisphere are noted. Further evaluation with MRI examination of the  brain is recommended.      The above information was called to and discussed with VELMA Sheikh.           Radiation dose reduction techniques were utilized, including automated  exposure control and exposure modulation based on body size.     This report was finalized on 6/5/2020 4:22 PM by Dr. Elliot Galdamez M.D.       XR Chest 1 View [[id number]] Collected:  06/05/20 1508     Updated:  06/05/20 1512    Narrative:       XR CHEST 1 VW-     HISTORY: Female who is 75 years-old,  hypoglycemia     TECHNIQUE: Frontal view of the chest     COMPARISON: 05/03/2020     FINDINGS: Stable appearing right-sided dual-lumen central venous  catheter in the left chest port. Heart, mediastinum and pulmonary  vasculature are unremarkable. No focal pulmonary  consolidation, pleural  effusion, or pneumothorax. No acute osseous process.       Impression:       No focal pulmonary consolidation. Follow-up as clinical  indications persist.     This report was finalized on 6/5/2020 3:09 PM by Dr. Dell Wilson M.D.             Anthony Perez MD  06/07/20  19:01

## 2020-06-07 NOTE — PROGRESS NOTES
"Pharmacokinetic Evaluation - Vancomycin    Lucía Wise is a 75 y.o. female on vancomycin pharmacy to dose.  MRN: [id number]  : 1945    Day of vancomycin therapy: 3/7  Indication: sepsis  Consulted by: Dr. NAN Moeller, ID has been consulted     Goal pre-IHD level: 20-25 mcg/ml  Current dose: intermittent   Other antimicrobials: meropenem      Blood pressure (!) 79/62, pulse 76, temperature 94.8 °F (34.9 °C), resp. rate 24, height 167.6 cm (65.98\"), weight 88.6 kg (195 lb 5.2 oz), SpO2 (!) 75 %.  Results from last 7 days   Lab Units 20  0620  0019 20   CREATININE mg/dL 1.97* 2.92* 2.69*     Estimated Creatinine Clearance: 27.7 mL/min (A) (by C-G formula based on SCr of 1.97 mg/dL (H)).  Results from last 7 days   Lab Units 20  0020 20  0019 20  0522 20  0115 20   WBC 10*3/mm3 2.87*  --  2.75* 15.04*  --   --    HEMOGLOBIN g/dL 6.5*  --  8.1* 8.4*  --   --    HEMATOCRIT % 20.0*  --  25.0* 25.3*  --   --    PLATELETS 10*3/mm3 12*  --  26* 42*  --   --    LACTATE mmol/L  --  17.6*  --   --  6.7* 6.7*       Procal:  10.3      Cultures:      Microbiology Results (last 10 days)     Procedure Component Value - Date/Time    Blood Culture - Blood, Hand, Right [[id number]] Collected:  20    Lab Status:  Preliminary result Specimen:  Blood from Hand, Right Updated:  20     Blood Culture No growth at 24 hours    COVID-19,[facility], NP SWAB IN TRANSPORT MEDIA 8-12 HR TAT - Swab, Nasopharynx [[id number]]  (Normal) Collected:  20    Lab Status:  Final result Specimen:  Swab from Nasopharynx Updated:  20 1937     COVID19 Not Detected    Blood Culture - Blood, Arm, Left [[id number]] Collected:  20 1708    Lab Status:  Preliminary result Specimen:  Blood from Arm, Left Updated:  20 1731     Blood Culture No growth at 24 hours    Urine Culture - Urine, Urine, Catheter [[id number]]  (Abnormal) " Collected:  06/05/20 1412    Lab Status:  Final result Specimen:  Urine, Catheter Updated:  06/06/20 1024     Urine Culture Yeast isolated     Comment: No further work up.               Dosing hx (include troughs if drawn):  6/5       1817 1750 mg.  HD was skipped  6/6                   0522 random=22.7 mcg/ml  6/7 CRRT overnight. 0617 random=9.3 mcg/ml. 0838 1750 mg x 1   --around 0800 CRRT stopped.    Assessment:   Overnight crrt was started unexpectedly. 6/7 AM random vanc level is 9.3 mcg/ml. CRRT was stopped around 0800 and 1750 mg iv vanc given.  Family is considering palliative care. No plans for HD/CRRT today per nephrology notes and RN.  Will check random level tomorrow am.     Plan:  1) Vancomycin 1750 mg iv x 1 (done at 0838)  2) Random vanc level tomorrow am.  3) Encourage adequate hydration if appropriate. Monitor for decreased UOP, rash or other signs of vancomycin intolerance.    Thanks for this consult, will follow until Edvin villaseñor Pharm.D, BCCCP

## 2020-06-07 NOTE — CONSULTS
"Adult Nutrition  Assessment/PES    Patient Name:  Lucía Wise  YOB: 1945  MRN: [id number]  Admit Date:  6/5/2020    Assessment Date:  6/7/2020    Comments:  Nutrition consult for TF assessment. Patient is a 74yo female with respiratory failure on the vent, new strokes, septic shock. With h/o carcinoma of uterine, ESRD, DM, and HTN. CRRT stopped due to low blood pressure. Prognosis poor. BMI 31/6. TF's via NG tube with Novasource Renal currently at 30cc/hr, goal at 40ml/hr and water flushes 100cc q4hr.  Agree with current TF regimen. Will continue to follow.    Reason for Assessment     Row Name 06/07/20 1017          Reason for Assessment    Reason For Assessment  physician consult;TF/PN     Diagnosis  infection/sepsis;pulmonary disease;renal disease;neurologic conditions;cancer diagnosis/related complications respiratory failure, sepsis, septic shock. ARF, Stroke,DM, uterine cancer, HTN         Nutrition/Diet History     Row Name 06/07/20 1039          Nutrition/Diet History    Typical Food/Fluid Intake  poor prognosis, Family has opted for DNR, considering comfort care. CRRT stopped.         Anthropometrics     Row Name 06/07/20 1039          Anthropometrics    Height  167.6 cm (65.98\")     Weight  88.6 kg (195 lb 5.2 oz) not weighed by         Ideal Body Weight (IBW)    Ideal Body Weight (IBW) (kg)  59.54     % Ideal Body Weight  148.81        Body Mass Index (BMI)    BMI (kg/m2)  31.61     BMI Assessment  BMI 30-34.9: obesity grade I         Labs/Tests/Procedures/Meds     Row Name 06/07/20 1017          Labs/Procedures/Meds    Lab Results Reviewed  reviewed     Lab Results Comments  glu, cr, platelets, alb, H/H        Diagnostic Tests/Procedures    Diagnostic Test/Procedure Reviewed  reviewed     Diagnostic Test/Procedures Comments  CRRT stopped due to low blood pressure        Medications    Pertinent Medications Reviewed  reviewed     Pertinent Medications Comments  protonix, abx, pressors     " "    Physical Findings     Row Name 06/07/20 1042          Physical Findings    Overall Physical Appearance  on ventilator support     Gastrointestinal  feeding tube     Tubes  nasogastric tube     Skin  poor skin integrity/turgor bilateral posterior gluteal MASD, bilateral anterior abd MASD, skin tears, excoriation         Estimated/Assessed Needs     Row Name 06/07/20 1039 06/07/20 1017       Calculation Measurements    Weight Used For Calculations  --  88.6 kg (195 lb 5.2 oz)    Height  167.6 cm (65.98\")  --       Estimated/Assessed Needs    Additional Documentation  --  Fluid Requirements (Group);Protein Requirements (Group);KCAL/KG (Group)       KCAL/KG    KCAL/KG  --  20 Kcal/Kg (kcal)    20 Kcal/Kg (kcal)  --  1772       Protein Requirements    Weight Used For Protein Calculations  --  88.6 kg (195 lb 5.2 oz)    Est Protein Requirement Amount (gms/kg)  --  1.0 gm protein    Estimated Protein Requirements (gms/day)  --  88.6       Fluid Requirements    Estimated Fluid Requirements (mL/day)  --  1772    RDA Method (mL)  --  1772        Nutrition Prescription Ordered     Row Name 06/07/20 1018          Nutrition Prescription PO    Current PO Diet  NPO        Nutrition Prescription EN    Enteral Route  NG     Product  Novasource Renal (Nepro)     TF Delivery Method  Continuous     Continuous TF Goal Rate (mL/hr)  50 mL/hr     Continuous TF Current Rate (mL/hr)  30 mL/hr     Water flush (mL)   100 mL     Water Flush Frequency  Every 4 hours         Evaluation of Received Nutrient/Fluid Intake     Row Name 06/07/20 1039          Calories Evaluation    Enteral Calories (kcal)  1440     % of Kcal Needs  81        Protein Evaluation    Enteral Protein (gm)  65     % of Protein Needs  74        Fluid Intake Evaluation    Enteral (Free Water) Fluid (mL)  518     Free Water Flush Fluid (mL)  600     Total Free Water Intake (mL)  1118 mL        EN Evaluation    TF Tolerance  -- BM 6/7     "           Problem/Interventions:  Problem 1     Row Name 06/07/20 1018          Nutrition Diagnoses Problem 1    Problem 1  Needs Alternate Route     Etiology (related to)  Medical Diagnosis               Intervention Goal     Row Name 06/07/20 1018          Intervention Goal    General  Maintain nutrition;Nutrition support treatment;Improved nutrition related lab(s);Reduce/improve symptoms;Meet nutritional needs for age/condition;Disease management/therapy     TF/PN  Inititiate TF/PN;Tolerate TF at goal     Weight  No significant weight loss         Nutrition Intervention     Row Name 06/07/20 1018          Nutrition Intervention    RD/Tech Action  Care plan reviewd;Follow Tx progress           Education/Evaluation     Row Name 06/07/20 1018          Education    Education  Education not appropriate at this time        Monitor/Evaluation    Monitor  Per protocol           Electronically signed by:  Tiarra Rehman RD  06/07/20 10:59

## 2020-06-07 NOTE — PROCEDURES
Endotracheal Intubation Procedure Note-Emergent      Indication for endotracheal intubation: airway compromise.  Sedation: propofol 100mg  Equipment: A video GlideScope was used and Marla 4 laryngoscope blade and 7.0mm cuffed endotracheal tube.  Number of attempts: 1.  ETT location confirmed by auscultation, ETCO2 monitor and cxr ordered pending.    Yang Moeller MD  6/6/2020  [id number]

## 2020-06-07 NOTE — NURSING NOTE
Pt hollering out most of night. Became somnolent around 2200. Noted to have decreased respirations and unresponive. Stat Abg done and Dr Moeller pagetommy. Called Daughter Reba and she stated she would like mother to be intubated. Dr Moeller on unit and uneventful intubation performed. PCXR done for placement.Updated daughter Reba.

## 2020-06-07 NOTE — NURSING NOTE
Dr. Pierce at bedside - said to stop CRRT if Pt's blood pressure does not tolerate.     Pt's BP in the 50's CRRT stopped. MD's spoke with Family and decided not to escalate care at this time and make patient a DNR.

## 2020-06-07 NOTE — PROGRESS NOTES
PROGRESS NOTE      Patient Name: Lucía Wise  : 1945  MRN: [id number]  Primary Care Physician: Lorelei Campos DO  Date of admission: 2020    Patient Care Team:  Lorelei Campos DO as PCP - General (Internal Medicine)        Subjective   Subjective:     Overnight patient got extremely short of breath intubated, septic extremely low blood pressure, extremely acidotic bicarb level 8 pH of 7.  Not of sodium bicarbonate was given at that time.  CRRT initiated.  Patient hemodynamically unstable overnight requiring multiple pressors.  Just discussed with the pulmonologist.  Review of systems:  Unable to obtain      Allergies:    Allergies   Allergen Reactions   • Ibuprofen Other (See Comments)     Kidney failure   • Aspirin Unknown - Low Severity       Objective   Exam:     Vital Signs  Temp:  [94.5 °F (34.7 °C)-97.7 °F (36.5 °C)] 94.8 °F (34.9 °C)  Heart Rate:  [65-99] 76  Resp:  [14-27] 24  BP: ()/() 60/30  FiO2 (%):  [70 %-100 %] 70 %  SpO2:  [47 %-100 %] 75 %  on   ;   Device (Oxygen Therapy): nonrebreather mask;ventilator  Body mass index is 31.53 kg/m².    General: Afro-American  female intubated  Head:      Normocephalic and atraumatic.  Intubated unresponsive on CRRT  Eyes:      PERRL/EOM intact, conjunctiva and sclera clear .    Neck:      No masses, thyromegaly,  trachea central   Lungs:    Bilateral crackles bilaterally to auscultation.    Heart:      Regular rate and rhythm, no murmur no gallop  Abd:        Soft, nontender, not distended, bowel sounds positive, no shifting dullness.  Msk:        No deformity or scoliosis noted of thoracic or lumbar spine.    Pulses:   Pulses normal in all 4 extremities.    Extr:        No cyanosis or clubbing--significant edema.    Neuro:    Patient is intubated at this time  Skin:       Intact without lesions or rashes.    Psych:    Not applicable       Results Review:  I have personally reviewed most recent Data :  CBC    Results from  last 7 days   Lab Units 06/07/20  0617 06/07/20  0019 06/06/20  0522 06/05/20  1345   WBC 10*3/mm3 2.87* 2.75* 15.04* 13.78*   HEMOGLOBIN g/dL 6.5* 8.1* 8.4* 8.4*   PLATELETS 10*3/mm3 12* 26* 42* 42*     CMP Results from last 7 days   Lab Units 06/07/20  0019 06/06/20  0522 06/05/20  1345   SODIUM mmol/L 143 139 138   POTASSIUM mmol/L 4.3 3.9 4.4   CHLORIDE mmol/L 108* 103 101   CO2 mmol/L 6.5* 17.7* 21.2*   BUN mg/dL 10 10 10   CREATININE mg/dL 2.92* 2.69* 2.79*   GLUCOSE mg/dL 155* 142* 95   ALBUMIN g/dL 1.20* 1.50* 1.50*   BILIRUBIN mg/dL  --   --  0.8   ALK PHOS U/L  --   --  213*   AST (SGOT) U/L  --   --  62*   ALT (SGPT) U/L  --   --  41*     ABG  Results from last 7 days   Lab Units 06/07/20  0023 06/06/20  2225 06/06/20  0740   PH, ARTERIAL pH units 7.011* 7.000* 7.385   PCO2, ARTERIAL mm Hg 27.8* 32.5* 29.9*   PO2 ART mm Hg 277.5* 64.0* 81.3   BASE EXCESS ART mmol/L -22.6* -21.8* -6.2*     Imaging Results (Last 24 Hours)     Procedure Component Value Units Date/Time    XR Chest 1 View [[id number]] Collected:  06/06/20 2323     Updated:  06/06/20 2328    Narrative:       PORTABLE CHEST 06/06/2020 AT 11:03 PM     CLINICAL HISTORY: Airway compromise. Evaluate ET tube placement.     Compared to the previous chest dated 06/06/2020 at 0949 hours.     In the interval, an endotracheal tube has been inserted and appears in  satisfactory addition with its tip a few centimeters above the fran. A  right internal jugular dialysis catheter and left subclavian Mediport  catheter remain in satisfactory position. A nasogastric feeding tube has  also been inserted. The lungs are fairly well-expanded and appear free  of infiltrates. There are no pleural effusions. The heart is normal in  size.     This report was finalized on 6/6/2020 11:25 PM by Dr. Dmitri Lala M.D.       XR Abdomen KUB [[id number]] Collected:  06/06/20 1455     Updated:  06/06/20 1524    Narrative:       KUPROMISE     HISTORY: 75-year-old female status post  Cortrak placement.     FINDINGS: A KUB was obtained and demonstrates the tip of the feeding  tube in the expected location of the distal stomach.     This report was finalized on 6/6/2020 3:20 PM by Dr. Willy Malin M.D.       XR Chest 1 View [[id number]] Collected:  06/06/20 1033     Updated:  06/06/20 1038    Narrative:       XR CHEST 1 VW-     HISTORY: Female who is 75 years-old,  sepsis     TECHNIQUE: Frontal view of the chest     COMPARISON: 06/05/2020     FINDINGS: Stable appearing right-sided dual-lumen central venous  catheter, left chest port. The heart size is normal. The aorta appears  tortuous, appearance may be exaggerated by rightward patient rotation.  Pulmonary vasculature is unremarkable. No focal pulmonary consolidation,  pleural effusion, or pneumothorax. No acute osseous process.       Impression:       No focal pulmonary consolidation. Follow-up as clinical  indications persist.     This report was finalized on 6/6/2020 10:35 AM by Dr. Dell Wilson M.D.             Results for orders placed during the hospital encounter of 06/05/20   Adult Transthoracic Echo Complete W/ Cont if Necessary Per Protocol    Narrative · There is calcification of the aortic valve.  · Calculated EF = 70.0%.  · There is no evidence of pericardial effusion.        Scheduled Meds:  atorvastatin 80 mg Oral Nightly   meropenem 1 g Intravenous Q24H   meropenem 1 g Intravenous Once   midodrine 10 mg Oral TID AC   pantoprazole 40 mg Intravenous Q24H   sodium chloride 10 mL Intravenous Q12H   sodium chloride 10 mL Intravenous Q12H   vancomycin 1,750 mg Intravenous Once   Vancomycin Pharmacy Intermittent Dosing  Does not apply Daily     Continuous Infusions:  EPINEPHrine 0.02-0.3 mcg/kg/min Last Rate: 0.3 mcg/kg/min (06/07/20 0615)   norepinephrine 0.02-0.3 mcg/kg/min Last Rate: 0.3 mcg/kg/min (06/07/20 0411)   Pharmacy to Dose meropenem (MERREM)     Pharmacy to dose vancomycin     phenylephrine 0.5-3 mcg/kg/min Last  Rate: 3 mcg/kg/min (06/07/20 0543)   Phoxillum 4 K/2.5 CA 2,000 mL/hr Last Rate: 2,000 mL/hr (06/07/20 0340)   Phoxillum 4 K/2.5 CA 2,000 mL/hr Last Rate: 2,000 mL/hr (06/07/20 0339)   Phoxillum 4 K/2.5 CA 1,000 mL/hr Last Rate: 1,000 mL/hr (06/07/20 0301)   propofol 5-50 mcg/kg/min Last Rate: Stopped (06/07/20 0044)   sodium bicarbonate 150 mEq Last Rate: Stopped (06/07/20 2345)   vasopressin 0.03 Units/min Last Rate: Stopped (06/07/20 0220)     PRN Meds:calcium gluconate IVPB **OR** calcium gluconate IVPB  •  dextrose  •  dextrose  •  glucagon (human recombinant)  •  heparin (porcine)  •  heparin (porcine)  •  magnesium sulfate  •  ondansetron  •  Pharmacy to Dose meropenem (MERREM)  •  Pharmacy to dose vancomycin  •  potassium chloride  •  Access VAD **AND** sodium chloride  •  sodium chloride  •  sodium chloride  •  sodium chloride  •  sodium phosphate IVPB    Assessment/Plan   Assessment and Plan:         Sepsis with acute renal failure and septic shock (CMS/HCC)    ASSESSMENT:  · Likely sepsis with significant leukocytosis, lactic acidosis, low blood pressure on pressors  · ESRD, has right IJ tunnel catheter, goes to [facility], hemodialysis Monday Wednesday Friday.  · Volume, mildly increased.  · Gram-negative bacteremia, blood cultures from outpatient 2 x 2 positive for gram-negative bacteremia.  · Peritoneal carcinomatosis.  · Stage IV primary peritoneal carcinomatosis followed by Dr. Fitzpatrick at [facility], s/p cycle 2 of chemotherapy Taxol, carboplatinum as of 4/29/2020.  · Sepsis with acute renal failure and septic shock (CMS/HCC)  · Severe acidosis        Plan:      · CRRT will be reinitiated at this time  for better volume   · patient prognosis seem to be poor because of the presence of metastatic cancer in the presence of ESRD  · Discussed with the pulmonary in detail patient prognosis is poor no escalation of therapy at this time  · Consider comfort care if patient continued to get  worse  · Discussed with the nurse in detail at this time as patient hemodynamically unstable CRRT can be stopped if blood pressure keeps dropping  · Patient has right IJ catheter blood cultures was drawn rule out catheter related bacteremia  ·  Stage IV primary peritoneal carcinomatosis followed by Dr. Fitzpatrick at [facility], s/p cycle 2 of chemotherapy Taxol, carboplatinum as of 4/29/2020.  · Patient has severe thrombocytopenia which is chronic.    Platelet are being transfused  · Hemoglobin is on the low side but acceptable with ESRD, malignancy, no need of transfusion..  · Overall outlook is poor.  · Thanks for consultation.          Note started  by Cristobal Pierce MD, 06/07/20, 8:11 AM.  [facility] kidney consultant

## 2020-06-07 NOTE — PROGRESS NOTES
"LPC Cross Cover:    S: Called stat by nursing staff due to altered mental status and abnormal ABG.  Upon arrival the patient barely moans to any noxious stimuli.  She is obviously not protecting her airway reviewed ABG showing a pH of 7.0  O:  BP (!) 62/33   Pulse 89   Temp 97.7 °F (36.5 °C) (Oral)   Resp 27   Ht 167.6 cm (66\")   Wt 79.6 kg (175 lb 7.8 oz)   SpO2 100%   BMI 28.32 kg/m²   Unresponsive chronically ill-appearing female shallow respiratory effort tachypneic unable to answer any questions moans will not follow commands tachycardic distended abdomen  Results from last 7 days   Lab Units 06/06/20  0522 06/05/20  1345   WBC 10*3/mm3 15.04* 13.78*   HEMOGLOBIN g/dL 8.4* 8.4*   PLATELETS 10*3/mm3 42* 42*     Results from last 7 days   Lab Units 06/06/20  0522 06/05/20  1345   SODIUM mmol/L 139 138   POTASSIUM mmol/L 3.9 4.4   CHLORIDE mmol/L 103 101   CO2 mmol/L 17.7* 21.2*   BUN mg/dL 10 10   CREATININE mg/dL 2.69* 2.79*   GLUCOSE mg/dL 142* 95   CALCIUM mg/dL 7.2* 7.1*   PHOSPHORUS mg/dL 2.5  --    Estimated Creatinine Clearance: 19.2 mL/min (A) (by C-G formula based on SCr of 2.69 mg/dL (H)).      A/P:  Severe metabolic acidosis  Airway compromise  Acute encephalopathy  Acute/subacute Rt parietal CVA  Shock - Possible septic   Stage IV peritoneal carcinoma with mediastinal- Dr. Fitzpatrick at [facility], s/p cycle 2 of chemotherapy Taxol, carboplatinum as of 4/29/2020.  ESRD on HD  Chronic diastolic dysfunction  Recent DVT/PE 2/20 s/p IVC filter on coumadin( per [place] dc summary)   Recent ESBL bacteremia  H/o CVA  H/o HIT  Hyperparathyroidism  Nephrolithiasis  Protein calorie malnutrition  Morbid obesity  Diabetes    Patient is not protecting airway pH is 7.0 stat intubation emergently at bedside  Stat page to nephrology consideration of renal replacement therapy  Vasopressors titrate up as needed  Check lactate  Repeat renal function panel  Discussed with bedside nursing charge " nurse  Returned page stat to bedside  Discussed with respiratory therapist  ABG 30 to 60 minutes  Ventilator settings reviewed and purposely hyperventilating to allow for CO2 levels to decrease to compensate for metabolic acidosis as best able    Patient is severely ill and prognosis is extremely poor.  Verified patient is full code.  Family updated  Additional critical care time today 45 minutes outside of time spent on billable procedures  Yang Moeller MD  [facility]  06/06/20  11:24 PM

## 2020-06-07 NOTE — PROGRESS NOTES
Anthony Perez MD                          [phone]      Patient ID:    Name:  Lucía Wise    MRN:  [id number]    1945   75 y.o.  female            Patient Care Team:  Lorelei Campos DO as PCP - General (Internal Medicine)    CC/ Reason for visit: Shock, acute encephalopathy, possible subacute stroke    Subjective: Pt seen and examined this AM.  Acute overnight events noted.  Patient became significantly worse yesterday evening and ended up with severe metabolic acidosis from lactic acidosis with repeat check of 17.  She had to be placed on mechanical ventilator due to poor respiratory compensation and encephalopathy.  She was started on dialysis.  She is currently on 4 pressors.  She is not responding to current treatment plan.  She does not have much venous access and will need emergent central line placement.  She is also not tolerating dialysis.  Discussed with nephrology at bedside and we will attempt to continue with the same if she can tolerate if not we will hold.    ROS: Unable to obtain due to patient's encephalopathy.    Objective     Vital Signs past 24hrs    BP range: BP: ()/() 96/76  Pulse range: Heart Rate:  [65-99] 76  Resp rate range: Resp:  [14-27] 24  Temp range: Temp (24hrs), Av.3 °F (35.7 °C), Min:94.5 °F (34.7 °C), Max:97.7 °F (36.5 °C)      Ventilator/Non-Invasive Ventilation Settings (From admission, onward)    None          Device (Oxygen Therapy): nonrebreather mask;ventilator FiO2 (%): 70 %     88.6 kg (195 lb 5.2 oz); Body mass index is 31.53 kg/m².      Intake/Output Summary (Last 24 hours) at 2020 0753  Last data filed at 2020 0700  Gross per 24 hour   Intake 3544 ml   Output 504 ml   Net 3040 ml       PHYSICAL EXAM   Constitutional: Middle aged obese -American female on the mechanical ventilator responds to painful stimuli but otherwise appears comfortable  Head: - NCAT  Eyes: No pallor.  Anicteric  sclerae, EOMI.  ENMT:  ETT+, no oral thrush. Moist MM.   NECK: Trachea midline, No thyromegaly, no palpable cervical lymphadenopathy  Heart: RRR, tachycardic no murmur. No pedal edema   Lungs: VIGNESH +, decreased breath sounds at the base no wheezes/ crackles heard    Abdomen: Soft.  Obese, no tenderness, guarding or rigidity. No palpable masses  Extremities: Extremities warm and well perfused. No cyanosis/ clubbing  Neuro: Sedated on the mechanical ventilator, no gross focal neuro deficits  Psych: Mood and affect unable to obtain    Scheduled meds:      atorvastatin 80 mg Oral Nightly   meropenem 1 g Intravenous Q24H   midodrine 10 mg Oral TID AC   pantoprazole 40 mg Intravenous Q24H   sodium chloride 10 mL Intravenous Q12H   sodium chloride 10 mL Intravenous Q12H   Vancomycin Pharmacy Intermittent Dosing  Does not apply Daily       IV meds:                          EPINEPHrine 0.02-0.3 mcg/kg/min Last Rate: 0.3 mcg/kg/min (06/07/20 0615)   norepinephrine 0.02-0.3 mcg/kg/min Last Rate: 0.3 mcg/kg/min (06/07/20 0411)   Pharmacy to Dose meropenem (MERREM)     Pharmacy to dose vancomycin     phenylephrine 0.5-3 mcg/kg/min Last Rate: 3 mcg/kg/min (06/07/20 0543)   Phoxillum 4 K/2.5 CA 2,000 mL/hr Last Rate: 2,000 mL/hr (06/07/20 0340)   Phoxillum 4 K/2.5 CA 2,000 mL/hr Last Rate: 2,000 mL/hr (06/07/20 0339)   Phoxillum 4 K/2.5 CA 1,000 mL/hr Last Rate: 1,000 mL/hr (06/07/20 0301)   propofol 5-50 mcg/kg/min Last Rate: Stopped (06/07/20 0044)   sodium bicarbonate 150 mEq Last Rate: Stopped (06/07/20 2345)   vasopressin 0.03 Units/min Last Rate: Stopped (06/07/20 0220)       Data Review:      Results from last 7 days   Lab Units 06/07/20  0617 06/07/20  0019 06/06/20  1727 06/06/20  1603 06/06/20 0522 06/05/20  1345   SODIUM mmol/L  --  143  --   --  139 138   POTASSIUM mmol/L  --  4.3  --   --  3.9 4.4   CHLORIDE mmol/L  --  108*  --   --  103 101   CO2 mmol/L  --  6.5*  --   --  17.7* 21.2*   BUN mg/dL  --  10  --   --   10 10   CREATININE mg/dL  --  2.92*  --   --  2.69* 2.79*   CALCIUM mg/dL  --  6.9*  --   --  7.2* 7.1*   BILIRUBIN mg/dL  --   --   --   --   --  0.8   ALK PHOS U/L  --   --   --   --   --  213*   ALT (SGPT) U/L  --   --   --   --   --  41*   AST (SGOT) U/L  --   --   --   --   --  62*   GLUCOSE mg/dL  --  155*  --   --  142* 95   WBC 10*3/mm3  --  2.75*  --   --  15.04* 13.78*   HEMOGLOBIN g/dL  --  8.1*  --   --  8.4* 8.4*   PLATELETS 10*3/mm3  --  26*  --   --  42* 42*   INR  3.49*  --  6.62* 6.67*  --   --    PROCALCITONIN ng/mL  --   --   --   --   --  10.33*       Lab Results   Component Value Date    CALCIUM 6.9 (L) 06/07/2020    PHOS 3.8 06/07/2020       Results from last 7 days   Lab Units 06/05/20  2030 06/05/20  1708 06/05/20  1412   BLOODCX  No growth at 24 hours No growth at 24 hours  --    URINECX   --   --  Yeast isolated*       Results from last 7 days   Lab Units 06/07/20  0023 06/06/20  2225 06/06/20  0740   PH, ARTERIAL pH units 7.011* 7.000* 7.385   PO2 ART mm Hg 277.5* 64.0* 81.3   PCO2, ARTERIAL mm Hg 27.8* 32.5* 29.9*   HCO3 ART mmol/L 7.0* 8.0* 17.9*        Results Review:    I have reviewed the relevant laboratory results and independently reviewed the chest imaging from this hospitalization including the available echocardiogram reports personally and summarized it if/ when appropriate below    Assessment    Acute encephalopathy  Acute respiratory failure s/p mechanical ventilation 6/6  Acute/subacute Rt parietal CVA  Shock - Possible septic   Severe lactic acidosis  Severe metabolic acidosis  Stage IV peritoneal carcinoma with mediastinal LNpathy-  at [facility], s/p cycle 2 of chemotherapy Taxol, carboplatinum as of 4/29/2020.  ESRD on HD  Chronic diastolic dysfunction  Recent DVT/PE 2/20 s/p IVC filter on coumadin( per [facility] dc summary)   Recent ESBL bacteremia  H/o CVA  H/o HIT  Hyperparathyroidism  Nephrolithiasis  Protein calorie malnutrition  Morbid  obesity  Diabetes    PLAN:  Events overnight noted.  Patient had to be placed on mechanical ventilator for severe metabolic acidosis and lactic acidosis likely from septic shock in the setting of renal failure.  She was started on CRRT unfortunately is on 4 pressors now.  Not responding still.  No obvious source of infection noted but continuing on aggressive antibiotic regimen.  Echocardiogram reviewed and does not show any acute issues.  Discussed with nephrology and we are attempting CRRT but given lack of any meaningful blood pressure I do not think we will we will continue with the same for any time longer.  Does not have enough venous access and will need central line but I do not think she would tolerate it and would benefit as well.  Unable to do MRI as patient is too unstable to go down.  Secondary stroke prophylaxis  INR is supratherapeutic and hence patient will need no additional anticoagulation.     Extremely poor prognosis given advanced cancer along with multiple new life-threatening issues and prior complications.    Addendum - Discussed with daughter Rylee and explained to her about the current issues and that she will not tolerate any further aggressive measures and has not responded to current treatment plan and we both agreed that we should make her DNR and no escalation. She wants me to continue with the current plan and she continues to hope for response.  I told her that we will continue with doing what we are doing for now as long as she tolerates.    CC - 80 mins     I have discussed my findings and recommendations with patient and nursing staff.     Anthony Perez MD  6/7/2020

## 2020-06-08 LAB
BH BB BLOOD EXPIRATION DATE: NORMAL
BH BB BLOOD TYPE BARCODE: 5100
BH BB BLOOD TYPE BARCODE: 5100
BH BB BLOOD TYPE BARCODE: 6200
BH BB BLOOD TYPE BARCODE: 6200
BH BB DISPENSE STATUS: NORMAL
BH BB PRODUCT CODE: NORMAL
BH BB UNIT NUMBER: NORMAL
CROSSMATCH INTERPRETATION: NORMAL
CROSSMATCH INTERPRETATION: NORMAL
UNIT  ABO: NORMAL
UNIT  RH: NORMAL

## 2020-06-10 LAB
BACTERIA SPEC AEROBE CULT: NORMAL
BACTERIA SPEC AEROBE CULT: NORMAL